# Patient Record
Sex: FEMALE | URBAN - METROPOLITAN AREA
[De-identification: names, ages, dates, MRNs, and addresses within clinical notes are randomized per-mention and may not be internally consistent; named-entity substitution may affect disease eponyms.]

---

## 2017-01-25 ENCOUNTER — OFFICE VISIT (OUTPATIENT)
Dept: PSYCHIATRY | Facility: CLINIC | Age: 42
End: 2017-01-25
Attending: CLINICAL NURSE SPECIALIST
Payer: COMMERCIAL

## 2017-01-25 ENCOUNTER — OFFICE VISIT (OUTPATIENT)
Dept: PSYCHOLOGY | Facility: CLINIC | Age: 42
End: 2017-01-25

## 2017-01-25 VITALS
SYSTOLIC BLOOD PRESSURE: 124 MMHG | DIASTOLIC BLOOD PRESSURE: 69 MMHG | WEIGHT: 173.4 LBS | BODY MASS INDEX: 30.23 KG/M2 | HEART RATE: 66 BPM

## 2017-01-25 DIAGNOSIS — F32.0 MILD SINGLE CURRENT EPISODE OF MAJOR DEPRESSIVE DISORDER (H): Primary | ICD-10-CM

## 2017-01-25 DIAGNOSIS — F43.23 ADJUSTMENT DISORDER WITH MIXED ANXIETY AND DEPRESSED MOOD: Primary | ICD-10-CM

## 2017-01-25 DIAGNOSIS — F33.8 SEASONAL AFFECTIVE DISORDER (H): ICD-10-CM

## 2017-01-25 PROCEDURE — 99212 OFFICE O/P EST SF 10 MIN: CPT | Mod: 25,ZF

## 2017-01-25 PROCEDURE — 99212 OFFICE O/P EST SF 10 MIN: CPT | Mod: ZF

## 2017-01-25 NOTE — PROGRESS NOTES
Outpatient Psychiatry Progress Note     Provider: LYNDA Gonsalez CNS  Date: 2017  Service:  Medication follow up with counseling.   Patient Identification: Mellias Thompson  : 1975   MRN: [id number]    Mellisa Thompson is a 41 year old year old female who presents for ongoing psychiatric care.  Mellisa Thompson was last seen in clinic on 10/26/16.   At that time,   Assessment & Plan       Mellisa Thompson is seen today for follow up and reports she has noticed further improvement and would like to continue current medication. Reviewed length of treatment of at least 6 months but ideally one year.    Diagnosis  Axis 1: Adjustment Disorder with mixed anxiety and depression  Axis 2: none  Axis 3: See problem list in the medical record    Plan:  Medication: Continue Sertraline at 50mg    OTC Recommendations: no change  Lab Orders:  none  Referrals: none  Release of Information: none  Future Treatment Considerations:per symptoms  Return for Follow Up:2 months               2017  Today Mellisa reports that during break when she still slept to much. When working this is not a problem.  She sleeps too much on weekends.  Mild anxiety but mood has not been a problem. This winter was better than any other fox in the [place].   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  Side effects of medication include: none known.   Psychiatric Review of Systems:  The patient endorses symptoms of depression: In the last 2 weeks denies  She  patient endorses symptoms of anxiety : situational  She endorses symptoms of agustina including none.    She endorses symptoms of psychosis including no psychotic symptoms.       Review of Medical Systems:  Sleep: mild sleeping too much  Energy: stable  Concentration: no concerns  Appetite: stable  GI Concerns: none  Cardiac concerns: none  Neurological concerns: none  Other medical concerns: no new concerns  Current Substance Use:  Alcohol:denies frequent use or abuse  Other  "drugs:denies  Caffeine:not discussed  Nicotine: none  Past Medical History:   Past Medical History   Diagnosis Date     Epilepsy (H)      Depression      Anxiety      Patient Active Problem List   Diagnosis     Convulsions (H)     Sinus tachycardia     Adjustment disorder with mixed anxiety and depressed mood       Allergies: No Known Allergies       Current Medications     Current Outpatient Prescriptions Ordered in [facility]   Medication Sig Dispense Refill     sertraline (ZOLOFT) 50 MG tablet Take 1 tablet (50 mg) by mouth daily 90 tablet 3     Cholecalciferol (VITAMIN D) 2000 UNITS tablet Take 1 tablet by mouth daily Take one tablet daily. 90 tablet 3     Prenatal Vit-Fe Fumarate-FA (PRENATAL LOW IRON) 27-1 MG TABS Take one daily 90 tablet 1     Omega-3 Fatty Acids (OMEGA III EPA+DHA) 1000 MG CAPS Take one twice a day 90 capsule 1     ALPRAZolam (XANAX) 0.25 MG tablet Take 1 tablet (0.25 mg) by mouth 3 times daily as needed for anxiety       triamcinolone (KENALOG) 0.1 % ointment Apply to affected chest up to twice daily as needed. 80 g 1     minoxidil 5 % SOLN Apply topically once daily to entire scalp. . 60 mL 0     levETIRAcetam (KEPPRA) 500 MG tablet Take 2 tablets (1,000 mg) by mouth 2 times daily 120 tablet 11     No current [facility]-ordered facility-administered medications on file.        Mental Status Exam     Appearance:  Casually dressed and Well groomed  Behavior/relationship to examiner/demeanor:  Cooperative  Orientation: Oriented to person, place, time and situation  Psychomotor: normal form  Speech Rate:  Normal  Speech Spontaneity:  Normal  Mood:  \"okay\"  Affect:  Appropriate/mood-congruent  Thought Process (Associations):  Goal directed  Thought Content:  no overt psychosis, denies suicidal ideation, intent or thoughts and patient does not appear to be responding to internal stimuli  Abnormal Perception:  None  Attention/Concentration:  Normal  Language:  Intact  Insight:  Good  Judgment:  " Good      Results     Vital signs: /69 mmHg  Pulse 66  Wt 78.654 kg (173 lb 6.4 oz)    Laboratory Data:  no new data    Assessment & Plan      Mellisa Thompson is seen today for follow up and reports her mood has been stable and improved with Sertraline at current dose.   Can consider discontinuing Sertraline in August but at that time can discuss stress level, any change in mood in the summer.     Diagnosis  Axis 1: Adjustment Disorder with mixed anxiety and depression  Axis 2: none  Axis 3: See problem list in the medical record    Plan:  Medication: Continue current Sertraline at 50mg.  OTC Recommendations: none  Lab Orders:  none  Referrals: none  Release of Information: none  Future Treatment Considerations:per symptoms  Return for Follow Up:6 months, but can consider following up with her PCP instead of returning here.   The risks, benefits, alternatives and side effects have been discussed and are understood by the patient. The patient understands the risks of using street drugs or alcohol. There are no medical contraindications, the patient agrees to treatment, and has the capacity to do so. The patient understands to call 911 or come to the nearest ED if life threatening or urgent symptoms present.  Over 50% of this time was spent counseling the patient and/or coordinating care regarding review of social and occupational functioning.  In addition patient was counseled on health and wellness practices to augment medication treatment of symptoms. See note for details.    Dominique Miller, LYNDA CNS 1/25/2017

## 2017-01-25 NOTE — PATIENT INSTRUCTIONS
INSTRUCTIONS FOR LIGHT BOX THERAPY    PLACE THE LIGHT BOX ON A TABLE OR COUNTER WHERE YOU CAN SIT COMFORTABLY.  FOLLOW THE 'S INFORMATION FOR THE DISTANCE TO THE LIGHT BOX.  YOU SHOULD NOT STARE DIRECTLY INTO THE LIGHT. YOU CAN READ OR EAT WHILE USING THE LIGHT. YOUR EYES MUST BE OPEN.  START WITH 30 MINUTES OF LIGHT EXPOSURE AS SOON AS POSSIBLE AFTER WAKING ( BETWEEN 6AM AND 9 AM).  MOST PEOPLE USE LIGHT THERAPY THROUGH THE WINTER UNTIL SPRINGTIME.    WHEN TO EXPECT A RESPONSE    YOU SHOULD NOTICE A RESPONSE IN A FEW DAYS AND BY TWO WEEKS DEFINITE IMPROVEMENT.   WHEN LIGHT THERAPY IS STOPPED IT MAY BE A FEW DAYS BEFORE SYMPTOMS REAPPEAR.    WHAT TO DO IF IMPROVEMENT IS NOT NOTICED    IF NO IMPROVEMENT AFTER 10 TO 14 DAYS, TRY SPENDING UP TO 60 MINUTES PER DAY IN FRONT OF THE LIGHT EVERY MORNING OR DIVIDING THE TIME BETWEEN MORNING AND EVENING. DO NOT USE CLOSE TO BEDTIME.      _____________________________________________________________________

## 2017-01-25 NOTE — NURSING NOTE
Chief Complaint   Patient presents with     RECHECK     Adjustment disorder with mixed depression and anxiety     Reviewed allergies, smoking status, and pharmacy preference  Administered abuse screening questions   Obtained weight, blood pressure and heart rate   Darcy Grider LPN

## 2017-01-30 NOTE — PROGRESS NOTES
"Health Psychology                                      Department of Medicine                                           [facility] Mail Code 749    Edwina Lara, Ph.D., L.P. [phone]  [address] [place] [address], [facility] Luzma Oleary, Ph.D.,  L.P. [phone]  [place] [address]  Med Hansen, Ph.D., A.B.JOAO., L.P. [phone]       Joyce Huerta, PhD, LP [phone]  ________________________________________________________________________________________________  Health Psychology - Follow up Visit  Confidential Summary*    REFERRAL SOURCE  Self    CHIEF COMPLAINT/REASON FOR VISIT  Patient seen for weekly CBT session.      Subjective:  Patient began with report that she continues to experience anxiety regarding the results of the election and sees the similarity between Robyn and Espinoza, who ruled [facility] when she was there.  She continues to voice concern regarding the acceptance of fear of \"immigrants\" that this administration may be encouraging.  She also reported that, for the first time since living in [place], an episode of discrimination at a local restaurant.  She was able to speak to the owner but received little consolation.  She will attempt to be involved in the woman's movement and believes that her leadership skills may be helpful during this time.      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  If she is not accepted, she is not sure what she will do for her career but she maintains optimism that her future is bright.      She reported that she continues to go to the Yoga studio and is enjoying new friendships.      Discussed her improvements since beginning antidepressant treatment and she expressed appreciation and an intention to remain on zoloft throughout the winter months.  She has historically suferred some seasonal decreased mood but this is not happening " this year and she attributes this to the medication.    Objective:  Patient was on time for today s session, appropriately groomed and dressed, and demonstrated good eye contact.  She appeared friendly, alert and oriented.   Mood was euthymic and her affect bright.  Patient adamantly denied suicidal or assaultive ideation, plan, or intent.       Assessment:  The patient is complying with her antidepressant medication and recontinues to report improved overall mood and limited side effects. She is noteably less anxious and depressed and more optimistic about the future.      Plan: She reported that her new insurance covers my services and she would like to continue biweekly sessions to continue to explore career and relationship changes.      Time In: 3:00  Time Out: 4:00    Diagnosis:  Axis I MDD   Axis II Deferred   Axis III Epilepsy, see medical record   Axis IV Psychosocial and Environmental Stressors: new job, awaiting word on career direction,  recently     Joyce Huerta, Ph.D., L.P.      *In accordance with the Rules of the [place] Board of [employer], it is noted that psychological descriptions and scientific procedures underlying psychological evaluations have limitations.  Absolute predictions cannot be made based on information in this report.

## 2017-02-15 ENCOUNTER — OFFICE VISIT (OUTPATIENT)
Dept: PSYCHOLOGY | Facility: CLINIC | Age: 42
End: 2017-02-15

## 2017-02-15 DIAGNOSIS — F32.1 MODERATE SINGLE CURRENT EPISODE OF MAJOR DEPRESSIVE DISORDER (H): Primary | ICD-10-CM

## 2017-02-15 NOTE — MR AVS SNAPSHOT
After Visit Summary   2/15/2017    Mellisa Thompson    MRN: [id number]           Patient Information     Date Of Birth          1975        Visit Information        Provider Department      2/15/2017 3:00 PM Joyce Huerta, PhD [facility]        Today's Diagnoses     Moderate single current episode of major depressive disorder (H)    -  1       Follow-ups after your visit        Who to contact     Please call your clinic at [phone] to:    Ask questions about your health    Make or cancel appointments    Discuss your medicines    Learn about your test results    Speak to your doctor   If you have compliments or concerns about an experience at your clinic, or if you wish to file a complaint, please contact [facility] Patient Relations at [phone] or email us at [email or web address]         Additional Information About Your Visit        MyChart Information     Volar Videot is an electronic gateway that provides easy, online access to your medical records. With [email or web address], you can request a clinic appointment, read your test results, renew a prescription or communicate with your care team.     To sign up for Volar Videot visit the website at [email or web address]   You will be asked to enter the access code listed below, as well as some personal information. Please follow the directions to create your username and password.     Your access code is: [email or web address]  Expires: 3/20/2017  1:22 PM     Your access code will  in 90 days. If you need help or a new code, please contact your [facility] or call [phone] for assistance.        Care EveryWhere ID     This is your Care EveryWhere ID. This could be used by other organizations to access your [facility] medical records  [id number]         Blood Pressure from Last 3 Encounters:   17 124/69   16 110/70   10/26/16 119/74    Weight from Last 3  Encounters:   01/25/17 78.7 kg (173 lb 6.4 oz)   12/20/16 77 kg (169 lb 12.8 oz)   10/26/16 78.3 kg (172 lb 9.6 oz)              Today, you had the following     No orders found for display       Primary Care Provider Office Phone # Fax #    Ree Michele [phone]       [facility] [address] [facility] [address]  [facility] [address]        Thank you!     Thank you for choosing [facility]  for your care. Our goal is always to provide you with excellent care. Hearing back from our patients is one way we can continue to improve our services. Please take a few minutes to complete the written survey that you may receive in the mail after your visit with us. Thank you!             Your Updated Medication List - Protect others around you: Learn how to safely use, store and throw away your medicines at www.disposemymeds.org.          This list is accurate as of: 2/15/17 11:59 PM.  Always use your most recent med list.                   Brand Name Dispense Instructions for use    ALPRAZolam 0.25 MG tablet    XANAX     Take 1 tablet (0.25 mg) by mouth 3 times daily as needed for anxiety       levETIRAcetam 500 MG tablet    KEPPRA    120 tablet    Take 2 tablets (1,000 mg) by mouth 2 times daily       minoxidil 5 % Soln     60 mL    Apply topically once daily to entire scalp. .       OMEGA III EPA+DHA 1000 MG Caps     90 capsule    Take one twice a day       order for DME     1 Box    Equipment being ordered: Light box 10,000 Lux for seasonal affective disorder code F39       PRENATAL LOW IRON 27-1 MG Tabs     90 tablet    Take one daily       sertraline 50 MG tablet    ZOLOFT    90 tablet    Take 1 tablet (50 mg) by mouth daily       triamcinolone 0.1 % ointment    KENALOG    80 g    Apply to affected chest up to twice daily as needed.       vitamin D 2000 UNITS tablet     90 tablet    Take 1 tablet by mouth daily Take one tablet daily.

## 2017-02-16 NOTE — PROGRESS NOTES
Health Psychology                                      Department of Medicine                                           [facility] Mail Code 741    Edwina Lara, Ph.D., L.P. [phone]  [address] [place] [address], [facility] Luzma Oleary, Ph.D.,  L.P. [phone]  [place] [address]  Med Hansen, Ph.D., A.B.P.P., L.P. [phone]       Joyce Huerta, PhD, LP [phone]  ________________________________________________________________________________________________  Health Psychology - Follow up Visit  Confidential Summary*    REFERRAL SOURCE  Self    CHIEF COMPLAINT/REASON FOR VISIT  Patient seen for weekly CBT session.      Subjective:  Patient began with report that she had difficult interaction at her employment and believes the situation may have occurred due to an underlying disrespect, resentment and anger toward immigrants.  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  An issue occurred in which it was unclear who the child should go home with.  A teacher from the school openly confronted her, accusing her of not doing her job and implied that she looked confused and frequently looked this way.  The patient reported that the encounter occurred in front of parents, drivers and other students and was humiliating.  She reported that she was very upset at the encounter and was unable to go to work the next day for fear that she would be confronted by the same teacher.  She filed a complaint and the principal held a meting with a union rep and the teacher during which time the teacher explained.  The patient reported that she was offerred an apology but that the teacher appeared to justify her actions.     The patient is wondering if the recent election results and political climate surrounding immigrants in our  country may have instigated the situation.      She is considering relocating.  She did not receive an offer for the teachers program and is wondering if this may be an opportunity to make a move for herself.      Objective:  Patient was on time for today s session, appropriately groomed and dressed, and demonstrated good eye contact.  She appeared alert and oriented.   Mood was dysphoric and her affect anxious.  Patient adamantly denied suicidal or assaultive ideation, plan, or intent.       Assessment:  The patients recent situation was reported as traumatic and the patient is experiencing anxiety and avoidance behaviors.      Plan: She will be seen in one week to assist in adjusting to recent incident.        Time In: 3:00  Time Out: 4:00    Diagnosis:  Axis I MDD   Axis II Deferred   Axis III Epilepsy, see medical record   Axis IV Psychosocial and Environmental Stressors: work related     Joyce Huerta, Ph.D., L.P.      *In accordance with the Rules of the [place] Board of Psychology, it is noted that psychological descriptions and scientific procedures underlying psychological evaluations have limitations.  Absolute predictions cannot be made based on information in this report.

## 2017-02-24 ENCOUNTER — TELEPHONE (OUTPATIENT)
Dept: NEUROLOGY | Facility: CLINIC | Age: 42
End: 2017-02-24

## 2017-02-24 DIAGNOSIS — R56.9 CONVULSIONS (H): Primary | ICD-10-CM

## 2017-02-24 RX ORDER — LEVETIRACETAM 1000 MG/1
1000 TABLET ORAL 2 TIMES DAILY
Qty: 180 TABLET | Refills: 2 | Status: SHIPPED | OUTPATIENT
Start: 2017-02-24 | End: 2017-05-26

## 2017-02-24 NOTE — TELEPHONE ENCOUNTER
Writer received message:  Caller name:pt [phone]     Treating provider/specialty:   Kimberlee     Description of issue/question:   Pt wants 1000mg rtabs like she has had in past and  90D refills.     Said pharmacy has sent several requests for med -I asked to to give our new fax# to pharmacy but she refused to.     Pt is completely out of med. I encouraged her to get a refill from the pharmacy asap-that she should take asap.   She insists that we call pharmacy to straighten this issue out.     She also has med questions.   levETIRAcetam (KEPPRA) 500 MG tablet 120 tablet 11 7/26/2016 No   Sig: Take 2 tablets (1,000 mg) by mouth 2 times daily   Class: E-Prescribe      Writer called pharmacy and they indicate patient last picked up meds on 1-19-17 so she should be out currently.  She does have refills at this point, but as above is unhappy with 500 mg tabs.  Writer will D/C 500 mg tabs and order 1000 mg tabs per her request.    Giorgi Croft

## 2017-03-01 ENCOUNTER — OFFICE VISIT (OUTPATIENT)
Dept: PSYCHOLOGY | Facility: CLINIC | Age: 42
End: 2017-03-01

## 2017-03-01 DIAGNOSIS — F33.1 MAJOR DEPRESSIVE DISORDER, RECURRENT, MODERATE (H): Primary | ICD-10-CM

## 2017-03-01 NOTE — MR AVS SNAPSHOT
After Visit Summary   3/1/2017    Mellisa Thompson    MRN: [id number]           Patient Information     Date Of Birth          1975        Visit Information        Provider Department      3/1/2017 2:00 PM Joyce Huerta, PhD [facility]        Today's Diagnoses     Major depressive disorder, recurrent, moderate (H)    -  1       Follow-ups after your visit        Your next 10 appointments already scheduled     Mar 15, 2017  2:00 PM CDT   (Arrive by 1:45 PM)   Return Visit with Joyce Huerta, PhD NIYAH   [facility] ([facility] [employer] [facility] [employer])    [address] [place] [id number] [phone]              Who to contact     Please call your clinic at [phone] to:    Ask questions about your health    Make or cancel appointments    Discuss your medicines    Learn about your test results    Speak to your doctor   If you have compliments or concerns about an experience at your clinic, or if you wish to file a complaint, please contact [facility] Patient Relations at [phone] or email us at [email or web address]         Additional Information About Your Visit        MyChart Information     Prismatict is an electronic gateway that provides easy, online access to your medical records. With [employer], you can request a clinic appointment, read your test results, renew a prescription or communicate with your care team.     To sign up for Prismatict visit the website at [email or web address]   You will be asked to enter the access code listed below, as well as some personal information. Please follow the directions to create your username and password.     Your access code is: [email or web address]  Expires: 3/20/2017  1:22 PM     Your access code will  in 90 days. If you need help or a new code, please contact your [facility] or call [phone] for assistance.         Care EveryWhere ID     This is your Care EveryWhere ID. This could be used by other organizations to access your [facility] medical records  [id number]         Blood Pressure from Last 3 Encounters:   01/25/17 124/69   12/20/16 110/70   10/26/16 119/74    Weight from Last 3 Encounters:   01/25/17 78.7 kg (173 lb 6.4 oz)   12/20/16 77 kg (169 lb 12.8 oz)   10/26/16 78.3 kg (172 lb 9.6 oz)              Today, you had the following     No orders found for display       Primary Care Provider Office Phone # Fax #    Ree Michele -[phone]       [facility] [address] [address]        Thank you!     Thank you for choosing [facility]  for your care. Our goal is always to provide you with excellent care. Hearing back from our patients is one way we can continue to improve our services. Please take a few minutes to complete the written survey that you may receive in the mail after your visit with us. Thank you!             Your Updated Medication List - Protect others around you: Learn how to safely use, store and throw away your medicines at www.disposemymeds.org.          This list is accurate as of: 3/1/17 11:59 PM.  Always use your most recent med list.                   Brand Name Dispense Instructions for use    ALPRAZolam 0.25 MG tablet    XANAX     Take 1 tablet (0.25 mg) by mouth 3 times daily as needed for anxiety       levETIRAcetam 1000 MG Tabs     180 tablet    Take 1,000 mg by mouth 2 times daily       minoxidil 5 % Soln     60 mL    Apply topically once daily to entire scalp. .       OMEGA III EPA+DHA 1000 MG Caps     90 capsule    Take one twice a day       order for DME     1 Box    Equipment being ordered: Light box 10,000 Lux for seasonal affective disorder code F39       PRENATAL LOW IRON 27-1 MG Tabs     90 tablet    Take one daily       sertraline 50 MG tablet    ZOLOFT    90 tablet    Take 1 tablet (50 mg) by mouth  daily       triamcinolone 0.1 % ointment    KENALOG    80 g    Apply to affected chest up to twice daily as needed.       vitamin D 2000 UNITS tablet     90 tablet    Take 1 tablet by mouth daily Take one tablet daily.

## 2017-03-02 ENCOUNTER — TELEPHONE (OUTPATIENT)
Dept: OBGYN | Facility: CLINIC | Age: 42
End: 2017-03-02

## 2017-03-02 NOTE — TELEPHONE ENCOUNTER
Telephone call from [facility], she is requesting Dr. Michele write a letter for the [employer]. She was supposed to travel on 1/2/17 and was unable to because of stomach flu, which consisted of vomiting, diarrhea and stomach pain. We will send this request to DR. Michele

## 2017-03-03 NOTE — TELEPHONE ENCOUNTER
Please see note below, per Dr. Michele,     Because there is NO documentation of her visit and it is two months past I do not feel comfortable writing this request.          Telephone call to Mellisa, no answer, left message with the above note from

## 2017-03-06 NOTE — PROGRESS NOTES
"Health Psychology                                      Department of Medicine                                           [facility] Mail Code 741    Edwina Lara, Ph.D., L.P. [phone]  [address] [place] [address], [facility] Luzma Oleary, Ph.D.,  L.P. [phone]  [place] [address]  Med Hansen, Ph.D., A.B.P.P., L.P. [phone]       Joyce Huerta, PhD, LP [phone]  ________________________________________________________________________________________________  Health Psychology - Follow up Visit  Confidential Summary*    REFERRAL SOURCE  Self    CHIEF COMPLAINT/REASON FOR VISIT  Patient seen for weekly CBT session.      Subjective:  Patient began with report that she continues to recognize that her resilience is low and would like our counseling to focus more on \"cultivating resilience\".  We discussed mindfulness and the work of Opal Perez on self compassion.  Will reach out to local experts for resources and possible referral to providers specializing in this work.   She continues to describe fatigue in her current occupational role and feeling defeated.  She is aware that she is underfunctioning but is not sure whether she should focus her energy on having a more fulfilling life outside of her occupation or if she should dedicate her energy to returning to school to retrain into an occupation that might be better suited to her interests and intellect.      She continues to process the impact of her marriage and divorce and is aware that [facility] may also not match her energy level and willingness to be vulnerable.  She continues to identify a \"draining\" existence.  She reported that she believes her trust has been lost and is now working to regain it but is not sure who she should be relying on.      Discussed what is lost when she trusts others.  She is beginning to consider dating.      Objective:  Patient was on time for " today s session, appropriately groomed and dressed, and demonstrated good eye contact.  She appeared alert and oriented.   Mood was dysphoric and her affect anxious.  Patient adamantly denied suicidal or assaultive ideation, plan, or intent.       Assessment:  The patients continues to describe dissatisfaction with occupation and isolation.      Plan: She will be seen in two weeks.          Time In: 2:00  Time Out: 3:00    Diagnosis:  Axis I MDD   Axis II Deferred   Axis III Epilepsy, see medical record   Axis IV Psychosocial and Environmental Stressors: work related     Joyce Huerta, Ph.D., L.P.      *In accordance with the Rules of the [place] Board of Psychology, it is noted that psychological descriptions and scientific procedures underlying psychological evaluations have limitations.  Absolute predictions cannot be made based on information in this report.

## 2017-03-15 ENCOUNTER — OFFICE VISIT (OUTPATIENT)
Dept: PSYCHOLOGY | Facility: CLINIC | Age: 42
End: 2017-03-15

## 2017-03-15 DIAGNOSIS — F32.0 MILD SINGLE CURRENT EPISODE OF MAJOR DEPRESSIVE DISORDER (H): Primary | ICD-10-CM

## 2017-03-15 NOTE — MR AVS SNAPSHOT
After Visit Summary   3/15/2017    Mellisa Thompson    MRN: [id number]           Patient Information     Date Of Birth          1975        Visit Information        Provider Department      3/15/2017 2:00 PM Joyce Huerta, PhD [facility]        Today's Diagnoses     Mild single current episode of major depressive disorder (H)    -  1       Follow-ups after your visit        Who to contact     Please call your clinic at [phone] to:    Ask questions about your health    Make or cancel appointments    Discuss your medicines    Learn about your test results    Speak to your doctor   If you have compliments or concerns about an experience at your clinic, or if you wish to file a complaint, please contact [facility] Patient Relations at [phone] or email us at [email or web address]         Additional Information About Your Visit        MyChart Information     BTIGt is an electronic gateway that provides easy, online access to your medical records. With BTCJam, you can request a clinic appointment, read your test results, renew a prescription or communicate with your care team.     To sign up for BTIGt visit the website at [email or web address]   You will be asked to enter the access code listed below, as well as some personal information. Please follow the directions to create your username and password.     Your access code is: [email or web address]  Expires: 3/20/2017  2:22 PM     Your access code will  in 90 days. If you need help or a new code, please contact your [facility] or call [phone] for assistance.        Care EveryWhere ID     This is your Care EveryWhere ID. This could be used by other organizations to access your [place] medical records  [id number]         Blood Pressure from Last 3 Encounters:   17 124/69   16 110/70   10/26/16 119/74    Weight from Last 3  Encounters:   01/25/17 78.7 kg (173 lb 6.4 oz)   12/20/16 77 kg (169 lb 12.8 oz)   10/26/16 78.3 kg (172 lb 9.6 oz)              Today, you had the following     No orders found for display       Primary Care Provider Office Phone # Fax #    Ree Michele [phone]       [facility] [address] [facility] [address]  [facility] [address]        Thank you!     Thank you for choosing [facility]  for your care. Our goal is always to provide you with excellent care. Hearing back from our patients is one way we can continue to improve our services. Please take a few minutes to complete the written survey that you may receive in the mail after your visit with us. Thank you!             Your Updated Medication List - Protect others around you: Learn how to safely use, store and throw away your medicines at www.disposemymeds.org.          This list is accurate as of: 3/15/17 11:59 PM.  Always use your most recent med list.                   Brand Name Dispense Instructions for use    ALPRAZolam 0.25 MG tablet    XANAX     Take 1 tablet (0.25 mg) by mouth 3 times daily as needed for anxiety       levETIRAcetam 1000 MG Tabs     180 tablet    Take 1,000 mg by mouth 2 times daily       minoxidil 5 % Soln     60 mL    Apply topically once daily to entire scalp. .       OMEGA III EPA+DHA 1000 MG Caps     90 capsule    Take one twice a day       order for DME     1 Box    Equipment being ordered: Light box 10,000 Lux for seasonal affective disorder code F39       PRENATAL LOW IRON 27-1 MG Tabs     90 tablet    Take one daily       sertraline 50 MG tablet    ZOLOFT    90 tablet    Take 1 tablet (50 mg) by mouth daily       triamcinolone 0.1 % ointment    KENALOG    80 g    Apply to affected chest up to twice daily as needed.       vitamin D 2000 UNITS tablet     90 tablet    Take 1 tablet by mouth daily Take one tablet daily.

## 2017-03-17 NOTE — PROGRESS NOTES
Health Psychology                                      Department of Medicine                                           [facility] Mail Code 741    Edwina Lara, Ph.D., L.P. [phone]  [address] [place] [address], [facility] [place] [place] Luzma Oleary, Ph.D.,  L.P. [phone]  [place] [address]  Med Hansen, Ph.D., A.B.P.CARLI., L.P. [phone]       Joyce Huerta, PhD, LP [phone]  ________________________________________________________________________________________________  Health Psychology - Follow up Visit  Confidential Summary*    REFERRAL SOURCE  Self    CHIEF COMPLAINT/REASON FOR VISIT  Patient seen for weekly CBT session.      Subjective:  Patient began with report that she is interested in resilience and received materials sent to her but has not had opportunity to review yet but will.      Patient clarified that she only has her brother, sis in law and their two children as family support here in [place].  I was under impression that her entire family was here but they remain in [facility] [place] [facility] and she has a brother in [place].  She talked at length of a close relationship with her nephew and her desire to be closer to 8 year old niece.      She excitedly reported that she has made a decision to relocate to [place] and to leave the home that she shared when  to [place], .  She reported that she informed him that they might now sell the home or he can relocate there but he pushed back and suggested that he may move to [place] himself.  Patient verbalized anxiety regarding whether he would do this. She would like a fresh start away from him, now that they are  almost 3 years..      She reported that she is considering dating.    Objective:  Patient was on time for today s session, appropriately groomed and dressed, and demonstrated good eye contact.  She appeared alert and oriented.   Mood was dysphoric and her affect  anxious.  Patient adamantly denied suicidal or assaultive ideation, plan, or intent.       Assessment:  The patients continues to describe dissatisfaction with occupation.  However, she is making some positive choices to begin a new chapter in her life.  .      Plan: She will be seen in two weeks.          Time In: 2:00  Time Out: 3:00    Diagnosis:  Axis I MDD   Axis II Deferred   Axis III Epilepsy, see medical record   Axis IV Psychosocial and Environmental Stressors: work related     Joyce Huerta, Ph.D., L.P.      *In accordance with the Rules of the [place] Board of Psychology, it is noted that psychological descriptions and scientific procedures underlying psychological evaluations have limitations.  Absolute predictions cannot be made based on information in this report.

## 2017-04-03 DIAGNOSIS — D50.8 OTHER IRON DEFICIENCY ANEMIA: ICD-10-CM

## 2017-04-03 RX ORDER — PNV,CALCIUM 72/IRON/FOLIC ACID 27 MG-1 MG
TABLET ORAL
Qty: 90 TABLET | Refills: 3 | Status: SHIPPED | OUTPATIENT
Start: 2017-04-03 | End: 2018-02-15

## 2017-04-12 ENCOUNTER — OFFICE VISIT (OUTPATIENT)
Dept: PSYCHOLOGY | Facility: CLINIC | Age: 42
End: 2017-04-12

## 2017-04-12 DIAGNOSIS — F33.0 MAJOR DEPRESSIVE DISORDER, RECURRENT, MILD (H): Primary | ICD-10-CM

## 2017-04-12 NOTE — MR AVS SNAPSHOT
After Visit Summary   2017    Mellisa Thompson    MRN: [id number]           Patient Information     Date Of Birth          1975        Visit Information        Provider Department      2017 2:00 PM Joyce Huerta, PhD NIYAH [facility]        Today's Diagnoses     Major depressive disorder, recurrent, mild (H)    -  1       Follow-ups after your visit        Your next 10 appointments already scheduled     May 03, 2017  2:00 PM CDT   (Arrive by 1:45 PM)   Return Visit with Joyce Huerta, PhD NIYAH   [facility] ([facility])    [address] [place] [id number] [phone]              Who to contact     Please call your clinic at [phone] to:    Ask questions about your health    Make or cancel appointments    Discuss your medicines    Learn about your test results    Speak to your doctor   If you have compliments or concerns about an experience at your clinic, or if you wish to file a complaint, please contact [facility] Patient Relations at [phone] or email us at [email or web address]         Additional Information About Your Visit        MyChart Information     Mixxt is an electronic gateway that provides easy, online access to your medical records. With Shopo, you can request a clinic appointment, read your test results, renew a prescription or communicate with your care team.     To sign up for Mixxt visit the website at [email or web address]   You will be asked to enter the access code listed below, as well as some personal information. Please follow the directions to create your username and password.     Your access code is: [email or web address]  Expires: 2017  2:28 PM     Your access code will  in 90 days. If you need help or a new code, please contact your [facility] or call [phone] for assistance.         Care EveryWhere ID     This is your Care EveryWhere ID. This could be used by other organizations to access your [place] medical records  [id number]         Blood Pressure from Last 3 Encounters:   04/14/17 119/76   01/25/17 124/69   12/20/16 110/70    Weight from Last 3 Encounters:   04/14/17 76.2 kg (167 lb 14.4 oz)   01/25/17 78.7 kg (173 lb 6.4 oz)   12/20/16 77 kg (169 lb 12.8 oz)              Today, you had the following     No orders found for display       Primary Care Provider Office Phone # Fax #    Ree Michele [phone]       [facility] [address] [address]        Thank you!     Thank you for choosing [facility]  for your care. Our goal is always to provide you with excellent care. Hearing back from our patients is one way we can continue to improve our services. Please take a few minutes to complete the written survey that you may receive in the mail after your visit with us. Thank you!             Your Updated Medication List - Protect others around you: Learn how to safely use, store and throw away your medicines at www.disposemymeds.org.          This list is accurate as of: 4/12/17 11:59 PM.  Always use your most recent med list.                   Brand Name Dispense Instructions for use    ALPRAZolam 0.25 MG tablet    XANAX     Take 1 tablet (0.25 mg) by mouth 3 times daily as needed for anxiety       levETIRAcetam 1000 MG Tabs     180 tablet    Take 1,000 mg by mouth 2 times daily       minoxidil 5 % Soln     60 mL    Apply topically once daily to entire scalp. .       OMEGA III EPA+DHA 1000 MG Caps     90 capsule    Take one twice a day       order for DME     1 Box    Equipment being ordered: Light box 10,000 Lux for seasonal affective disorder code F39       PREPLUS 27-1 MG Tabs     90 tablet    TAKE 1 TABLET BY MOUTH ONCE DAILY       sertraline 50 MG tablet    ZOLOFT    90 tablet    Take 1 tablet (50 mg) by  mouth daily       triamcinolone 0.1 % ointment    KENALOG    80 g    Apply to affected chest up to twice daily as needed.       vitamin D 2000 UNITS tablet     90 tablet    Take 1 tablet by mouth daily Take one tablet daily.

## 2017-04-14 ENCOUNTER — OFFICE VISIT (OUTPATIENT)
Dept: INTERNAL MEDICINE | Facility: CLINIC | Age: 42
End: 2017-04-14

## 2017-04-14 VITALS
RESPIRATION RATE: 18 BRPM | BODY MASS INDEX: 29.28 KG/M2 | DIASTOLIC BLOOD PRESSURE: 76 MMHG | TEMPERATURE: 98.2 F | SYSTOLIC BLOOD PRESSURE: 119 MMHG | WEIGHT: 167.9 LBS | OXYGEN SATURATION: 97 % | HEART RATE: 76 BPM

## 2017-04-14 DIAGNOSIS — R05.9 COUGH: Primary | ICD-10-CM

## 2017-04-14 DIAGNOSIS — J98.01 ACUTE BRONCHOSPASM: ICD-10-CM

## 2017-04-14 RX ORDER — ALBUTEROL SULFATE 90 UG/1
2 AEROSOL, METERED RESPIRATORY (INHALATION) EVERY 6 HOURS PRN
Qty: 1 INHALER | Refills: 0 | Status: SHIPPED | OUTPATIENT
Start: 2017-04-14 | End: 2018-02-15

## 2017-04-14 RX ORDER — BENZONATATE 100 MG/1
100 CAPSULE ORAL 3 TIMES DAILY PRN
Qty: 42 CAPSULE | Refills: 0 | Status: SHIPPED | OUTPATIENT
Start: 2017-04-14 | End: 2018-02-15

## 2017-04-14 ASSESSMENT — PAIN SCALES - GENERAL: PAINLEVEL: MODERATE PAIN (5)

## 2017-04-14 NOTE — MR AVS SNAPSHOT
After Visit Summary   4/14/2017    Mellisa Thompson    MRN: [id number]           Patient Information     Date Of Birth          1975        Visit Information        Provider Department      4/14/2017 2:00 PM Noreen Yang APRN [facility]        Today's Diagnoses     Cough    -  1    Acute bronchospasm          Care Instructions    Primary Care Center Medication Refill Request Information:  * Please contact your pharmacy regarding ANY request for medication refills.  ** [facility] Prescription Fax = [phone]  * Please allow 3 business days for routine medication refills.  * Please allow 5 business days for controlled substance medication refills.     Primary Care Center Test Result notification information:  *You will be notified with in 7-10 days of your appointment day regarding the results of your test.  If you are on MyChart you will be notified as soon as the provider has reviewed the results and signed off on them.    Primary Care Center Phone Number [phone]      Adult Self-Care for Colds  Colds are caused by viruses. They can t be cured with antibiotics. However, you can relieve symptoms and support your body s efforts to heal itself. No matter which symptoms you have, be sure to drink plenty of fluids (water or clear soup); stop smoking and drinking alcohol; and get plenty of rest.   Understand a fever    Take your temperature several times a day. If your fever is 100.4 F (38.0 C) for more than a day, call your doctor.    Relax, lie down. Go to bed if you want. Just get off your feet and rest. Also, drink plenty of fluids to avoid dehydration.    Take acetaminophen or a nonsteroidal anti-inflammatory agent (NSAID), such as ibuprofen.  Treat a troubled nose kindly    Breathe steam or heated humidified air to open blocked nasal passages.  a hot shower or use a vaporizer. Be careful not to get burned by the steam.    Saline nasal sprays and decongestant  tablets help open a stuffy nose. Antihistamines can also help, but they can cause side effects such as drowsiness and drying of the eyes, nose, and mouth.  Soothe a sore throat and cough    Gargle every 2 hours with 1/4 teaspoon of salt dissolved in 1/2 cup of warm water. Suck on throat lozenges and cough drops to moisten your throat.    Cough medicines are available but it is unclear how effective they actually are.    Take acetaminophen or an NSAID, such as ibuprofen to ease throat pain  Ease digestive problems    Put fluid back into your body. Take frequent sips of clear liquids such as water or broth. Do not drink beverages with a lot of sugar in them, such as juices and sodas. These can make diarrhea worse. Older children and adults can drink sports drinks.    As your appetite returns, you can resume your normal diet. Ask your doctor whether there are any foods you should avoid.     When to seek medical care  When you first notice symptoms, ask your health care provider if antiviral medications are appropriate. Antibiotics should not be taken for colds or flu. Also, call your doctor if you have any of the following symptoms or if you aren t feeling better after 7 days:    Shortness of breath    Pain or pressure in the chest or abdomen    Worsening symptoms, especially after a period of improvement    Fever of 100.4 F  (38.0 C) or higher, or fever that doesn t go down with medication    Sudden dizziness or confusion    Severe or continued vomiting    Signs of dehydration, including extreme thirst, dark urine, infrequent urination, dry mouth    Spotted, red, or very sore throat        0143-6320 [employer]. [address] [place] [address], [place] [address]. All rights reserved. This information is not intended as a substitute for professional medical care. Always follow your healthcare professional's instructions.              Follow-ups after your visit        Who to contact     Please call your clinic at  [phone] to:    Ask questions about your health    Make or cancel appointments    Discuss your medicines    Learn about your test results    Speak to your doctor   If you have compliments or concerns about an experience at your clinic, or if you wish to file a complaint, please contact [facility] Patient Relations at [phone] or email us at [email or web address]         Additional Information About Your Visit        [employer] Information     [employer] is an electronic gateway that provides easy, online access to your medical records. With [employer], you can request a clinic appointment, read your test results, renew a prescription or communicate with your care team.     To sign up for [employer] visit the website at [email or web address]   You will be asked to enter the access code listed below, as well as some personal information. Please follow the directions to create your username and password.     Your access code is: [email or web address]  Expires: 2017  2:28 PM     Your access code will  in 90 days. If you need help or a new code, please contact your [facility] or call [phone] for assistance.        Care EveryWhere ID     This is your Care EveryWhere ID. This could be used by other organizations to access your [place] medical records  [id number]        Your Vitals Were     Pulse Temperature Respirations Last Period Pulse Oximetry Breastfeeding?    76 98.2  F (36.8  C) (Oral) 18 04/10/2017 (Exact Date) 97% No    BMI (Body Mass Index)                   29.28 kg/m2            Blood Pressure from Last 3 Encounters:   17 119/76   16 110/70   16 117/74    Weight from Last 3 Encounters:   17 76.2 kg (167 lb 14.4 oz)   16 77 kg (169 lb 12.8 oz)   16 78 kg (172 lb)              Today, you had the following     No orders found for display         Today's Medication Changes          These changes are  accurate as of: 4/14/17  2:28 PM.  If you have any questions, ask your nurse or doctor.               Start taking these medicines.        Dose/Directions    albuterol 108 (90 BASE) MCG/ACT Inhaler   Commonly known as:  VENTOLIN HFA   Used for:  Acute bronchospasm   Started by:  Noreen Yang APRN CNP        Dose:  2 puff   Inhale 2 puffs into the lungs every 6 hours as needed for shortness of breath / dyspnea or wheezing   Quantity:  1 Inhaler   Refills:  0       benzonatate 100 MG capsule   Commonly known as:  TESSALON   Used for:  Cough   Started by:  Noreen Yang APRN CNP        Dose:  100 mg   Take 1 capsule (100 mg) by mouth 3 times daily as needed for cough   Quantity:  42 capsule   Refills:  0            Where to get your medicines      These medications were sent to [facility] [address] [place] [address] ALIZE LOTT AT [address] [facility] [address]     Phone:  [phone]     albuterol 108 (90 BASE) MCG/ACT Inhaler    benzonatate 100 MG capsule                Primary Care Provider Office Phone # Fax #    Ree Michele [phone]       [facility] [address] [address]        Thank you!     Thank you for choosing [facility]  for your care. Our goal is always to provide you with excellent care. Hearing back from our patients is one way we can continue to improve our services. Please take a few minutes to complete the written survey that you may receive in the mail after your visit with us. Thank you!             Your Updated Medication List - Protect others around you: Learn how to safely use, store and throw away your medicines at www.disposemymeds.org.          This list is accurate as of: 4/14/17  2:28 PM.  Always use your most recent med list.                   Brand Name Dispense Instructions for use    albuterol 108 (90 BASE) MCG/ACT Inhaler    VENTOLIN HFA    1  Inhaler    Inhale 2 puffs into the lungs every 6 hours as needed for shortness of breath / dyspnea or wheezing       ALPRAZolam 0.25 MG tablet    XANAX     Take 1 tablet (0.25 mg) by mouth 3 times daily as needed for anxiety       benzonatate 100 MG capsule    TESSALON    42 capsule    Take 1 capsule (100 mg) by mouth 3 times daily as needed for cough       levETIRAcetam 1000 MG Tabs     180 tablet    Take 1,000 mg by mouth 2 times daily       minoxidil 5 % Soln     60 mL    Apply topically once daily to entire scalp. .       OMEGA III EPA+DHA 1000 MG Caps     90 capsule    Take one twice a day       order for DME     1 Box    Equipment being ordered: Light box 10,000 Lux for seasonal affective disorder code F39       PREPLUS 27-1 MG Tabs     90 tablet    TAKE 1 TABLET BY MOUTH ONCE DAILY       sertraline 50 MG tablet    ZOLOFT    90 tablet    Take 1 tablet (50 mg) by mouth daily       triamcinolone 0.1 % ointment    KENALOG    80 g    Apply to affected chest up to twice daily as needed.       vitamin D 2000 UNITS tablet     90 tablet    Take 1 tablet by mouth daily Take one tablet daily.

## 2017-04-14 NOTE — NURSING NOTE
Chief Complaint   Patient presents with     Cough     Patient is here to discuss a dry cough. pt states bought 2 OTC medications and neither of them worked. Duration 1 week.      Pharyngitis     Patient is also here to discuss a sore throat. Duration about 4 days.      Lamar Mansfield LPN at 2:11 PM on 4/14/2017.

## 2017-04-14 NOTE — PROGRESS NOTES
Mellisa Thompson is a 41 year old female who comes in for    CC: URI symptoms  HPI:  Ms. Thompson recently traveled to [place], and started having cold symptoms while there. Cough has been present x1 week. Cough is dry/non-productive. Feels the OTC medications aren't working. She coughed so hard she felt nauseated. Will have episodes of coughing where she feels she can't stop, and her eyes will water.   Throat is irritated. No ear pain. Some nasal congestion/drainage. . No fevers. No allergies that she knows of, feels like maybe she has some allergies. No SOB.  Started OTC Allegra, didn't help.   Hasn't been sleeping well d/t cough.    Other issues discussed today:     Patient Active Problem List   Diagnosis     Convulsions (H)     Sinus tachycardia     Adjustment disorder with mixed anxiety and depressed mood     Seasonal affective disorder (H)       Current Outpatient Prescriptions   Medication Sig Dispense Refill     albuterol (VENTOLIN HFA) 108 (90 BASE) MCG/ACT Inhaler Inhale 2 puffs into the lungs every 6 hours as needed for shortness of breath / dyspnea or wheezing 1 Inhaler 0     benzonatate (TESSALON) 100 MG capsule Take 1 capsule (100 mg) by mouth 3 times daily as needed for cough 42 capsule 0     Prenatal Vit-Fe Fumarate-FA (PREPLUS) 27-1 MG TABS TAKE 1 TABLET BY MOUTH ONCE DAILY 90 tablet 3     levETIRAcetam 1000 MG TABS Take 1,000 mg by mouth 2 times daily 180 tablet 2     order for DME Equipment being ordered: Light box 10,000 Lux for seasonal affective disorder code F39 1 Box 0     Cholecalciferol (VITAMIN D) 2000 UNITS tablet Take 1 tablet by mouth daily Take one tablet daily. 90 tablet 3     Omega-3 Fatty Acids (OMEGA III EPA+DHA) 1000 MG CAPS Take one twice a day 90 capsule 1     ALPRAZolam (XANAX) 0.25 MG tablet Take 1 tablet (0.25 mg) by mouth 3 times daily as needed for anxiety       triamcinolone (KENALOG) 0.1 % ointment Apply to affected chest up to twice daily as needed. 80 g 1      minoxidil 5 % SOLN Apply topically once daily to entire scalp. . 60 mL 0     sertraline (ZOLOFT) 50 MG tablet Take 1 tablet (50 mg) by mouth daily (Patient not taking: Reported on 4/14/2017) 90 tablet 3         ALLERGIES: Review of patient's allergies indicates no known allergies.    PAST MEDICAL HX:   Past Medical History:   Diagnosis Date     Anxiety      Depression      Epilepsy (H)        PAST SURGICAL HX:   Past Surgical History:   Procedure Laterality Date     ANKLE SURGERY Right 1999     BRAIN SURGERY  2007    right temporal lobectomy        IMMUNIZATION HX:   Immunization History   Administered Date(s) Administered     MMR 05/03/2006     Tdap (Adacel,Boostrix) 05/09/2011     Varicella 05/03/2006       SOCIAL HX:   Social History     Social History Narrative    First-       ROS:   CONSTITUTIONAL: no fatigue, no unexpected change in weight  SKIN: no worrisome rashes, no worrisome moles, no worrisome lesions  EYES: no acute vision problems or changes  ENT:see HPI  RESP:see HPI  CV: no chest pain, no palpitations, no new or worsening peripheral edema  GI: no nausea, no vomiting, no constipation, no diarrhea    OBJECTIVE:  /76  Pulse 76  Temp 98.2  F (36.8  C) (Oral)  Resp 18  Wt 76.2 kg (167 lb 14.4 oz)  LMP 04/10/2017 (Exact Date)  SpO2 97%  Breastfeeding? No  BMI 29.28 kg/m2   Wt Readings from Last 1 Encounters:   04/14/17 76.2 kg (167 lb 14.4 oz)     Constitutional: no distress, comfortable, pleasant, well-groomed  Eyes: anicteric, conjunctiva pink, normal extra-ocular movements   Ears, Nose and Throat: tympanic membranes pearly gray with positive light reflex, EACs clear bilaterally, nose clear and free of lesions, throat mildly erythematous without tonsillar edema or exudates, mucosa pink and moist.   Neck: supple with full range of motion, no thyromegaly, no lymphadenopathy  Cardiovascular: regular rate and rhythm, normal S1 and S2, no murmurs, rubs or gallops, peripheral  pulses full and symmetric, cap refill <2 sec  Respiratory: clear to auscultation with good air movement bilaterally, mild expiratory wheezes in RUL, no crackles, non-labored    ASSESSMENT/PLAN:    1. Cough  Reviewed using OTC cough syrups to help with cough, may also try Tessalon TID to help with spasmatic cough. Reviewed swallowing whole. Discussed lungs are without crackles, and no fevers are reassuring for no pneumonia.  - benzonatate (TESSALON) 100 MG capsule; Take 1 capsule (100 mg) by mouth 3 times daily as needed for cough  Dispense: 42 capsule; Refill: 0    2. Acute bronchospasm  Discussed albuterol 2 puffs q 6 hr for wheezing, and recommended starting daily allergy medicine like Zyrtec or Allegra to help prevent cough related to seasonal allergies.   - albuterol (VENTOLIN HFA) 108 (90 BASE) MCG/ACT Inhaler; Inhale 2 puffs into the lungs every 6 hours as needed for shortness of breath / dyspnea or wheezing  Dispense: 1 Inhaler; Refill: 0    FOLLOW UP: If not improving or if worsening  LYNDA Jose CNP

## 2017-04-14 NOTE — PATIENT INSTRUCTIONS
[facility] Medication Refill Request Information:  * Please contact your pharmacy regarding ANY request for medication refills.  ** [facility] Prescription Fax = [phone]  * Please allow 3 business days for routine medication refills.  * Please allow 5 business days for controlled substance medication refills.     [facility] Test Result notification information:  *You will be notified with in 7-10 days of your appointment day regarding the results of your test.  If you are on MyChart you will be notified as soon as the provider has reviewed the results and signed off on them.    [facility] Phone Number [phone]      Adult Self-Care for Colds  Colds are caused by viruses. They can t be cured with antibiotics. However, you can relieve symptoms and support your body s efforts to heal itself. No matter which symptoms you have, be sure to drink plenty of fluids (water or clear soup); stop smoking and drinking alcohol; and get plenty of rest.   Understand a fever    Take your temperature several times a day. If your fever is 100.4 F (38.0 C) for more than a day, call your doctor.    Relax, lie down. Go to bed if you want. Just get off your feet and rest. Also, drink plenty of fluids to avoid dehydration.    Take acetaminophen or a nonsteroidal anti-inflammatory agent (NSAID), such as ibuprofen.  Treat a troubled nose kindly    Breathe steam or heated humidified air to open blocked nasal passages.  a hot shower or use a vaporizer. Be careful not to get burned by the steam.    Saline nasal sprays and decongestant tablets help open a stuffy nose. Antihistamines can also help, but they can cause side effects such as drowsiness and drying of the eyes, nose, and mouth.  Soothe a sore throat and cough    Gargle every 2 hours with 1/4 teaspoon of salt dissolved in 1/2 cup of warm water. Suck on throat lozenges and cough drops to moisten your throat.    Cough medicines are available but it is  unclear how effective they actually are.    Take acetaminophen or an NSAID, such as ibuprofen to ease throat pain  Ease digestive problems    Put fluid back into your body. Take frequent sips of clear liquids such as water or broth. Do not drink beverages with a lot of sugar in them, such as juices and sodas. These can make diarrhea worse. Older children and adults can drink sports drinks.    As your appetite returns, you can resume your normal diet. Ask your doctor whether there are any foods you should avoid.     When to seek medical care  When you first notice symptoms, ask your health care provider if antiviral medications are appropriate. Antibiotics should not be taken for colds or flu. Also, call your doctor if you have any of the following symptoms or if you aren t feeling better after 7 days:    Shortness of breath    Pain or pressure in the chest or abdomen    Worsening symptoms, especially after a period of improvement    Fever of 100.4 F  (38.0 C) or higher, or fever that doesn t go down with medication    Sudden dizziness or confusion    Severe or continued vomiting    Signs of dehydration, including extreme thirst, dark urine, infrequent urination, dry mouth    Spotted, red, or very sore throat        0245-8025 [employer]. [address] [place] [address], [place] [address]. All rights reserved. This information is not intended as a substitute for professional medical care. Always follow your healthcare professional's instructions.

## 2017-04-20 NOTE — PROGRESS NOTES
"Health Psychology                                      Department of Medicine                                           [facility] Mail Code 741    Edwina Lara, Ph.D., L.P. [phone]  [address] [place] [address], [facility] Luzma Oleary, Ph.D.,  L.P. [phone]  [place] [address]  Med Hansen, Ph.D., A.B.JOAO., L.P. [phone]       Joyce Huerta, PhD, LP [phone]  ________________________________________________________________________________________________  Health Psychology - Follow up Visit  Confidential Summary*    REFERRAL SOURCE  Self    CHIEF COMPLAINT/REASON FOR VISIT  Patient seen for weekly CBT session.      Subjective:  Patient began with report that she is excited at the prospect of moving into her own apartment in a part of the community where she used to live and that she has always wanted to return. Patient reported that she and her ex- have discussed her move and she has given her approval that the house be sold when she moves out. She reported that she believes this move will allow her to return to her authentic self.  Patient able to express belief that she purposely suppressed her \"real self\" in order to support her  and so that she would be able to be respected by his family.  She described multiple difficult encounters in which she felt invalidated.    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      Patient continues to describe some on line dating encounters.  "     Objective:  Patient was on time for today s session, appropriately groomed and dressed, and demonstrated good eye contact.  She appeared alert and oriented.   Mood was euthymic and the patients affect bright.  Patient adamantly denied suicidal or assaultive ideation, plan, or intent.       Assessment:  The patients continues to describe making some positive choices to begin a new chapter in her life following a divorce, relocation and loss of business prospects.    Plan: She will be seen in two weeks.          Time In: 2:00  Time Out: 3:00    Diagnosis:  Axis I MDD   Axis II Deferred   Axis III Epilepsy, see medical record   Axis IV Psychosocial and Environmental Stressors: work related     Joyce Huerta, Ph.D., L.P.      *In accordance with the Rules of the [place] [employer] of [employer], it is noted that psychological descriptions and scientific procedures underlying psychological evaluations have limitations.  Absolute predictions cannot be made based on information in this report.

## 2017-05-02 DIAGNOSIS — R56.9 CONVULSIONS (H): ICD-10-CM

## 2017-05-03 ENCOUNTER — OFFICE VISIT (OUTPATIENT)
Dept: PSYCHOLOGY | Facility: CLINIC | Age: 42
End: 2017-05-03

## 2017-05-03 DIAGNOSIS — F33.0 MAJOR DEPRESSIVE DISORDER, RECURRENT, MILD (H): Primary | ICD-10-CM

## 2017-05-03 NOTE — MR AVS SNAPSHOT
After Visit Summary   5/3/2017    Mellisa Thompson    MRN: [id number]           Patient Information     Date Of Birth          1975        Visit Information        Provider Department      5/3/2017 2:00 PM Joyce Huerta, PhD NIYAH [facility]        Today's Diagnoses     Major depressive disorder, recurrent, mild (H)    -  1       Follow-ups after your visit        Your next 10 appointments already scheduled     May 31, 2017  2:00 PM CDT   (Arrive by 1:45 PM)   Return Visit with Joyce Huerta, PhD NIYAH   [facility] ([facility] [place])    [address] [place] [id number] [phone]              Who to contact     Please call your clinic at [phone] to:    Ask questions about your health    Make or cancel appointments    Discuss your medicines    Learn about your test results    Speak to your doctor   If you have compliments or concerns about an experience at your clinic, or if you wish to file a complaint, please contact [facility] Patient Relations at [phone] or email us at [email or web address]         Additional Information About Your Visit        MyChart Information     [employer] is an electronic gateway that provides easy, online access to your medical records. With [employer], you can request a clinic appointment, read your test results, renew a prescription or communicate with your care team.     To sign up for [employer] visit the website at [email or web address]   You will be asked to enter the access code listed below, as well as some personal information. Please follow the directions to create your username and password.     Your access code is: [email or web address]  Expires: 2017  2:28 PM     Your access code will  in 90 days. If you need help or a new code, please contact your [facility] or call [phone] for assistance.         Care EveryWhere ID     This is your Care EveryWhere ID. This could be used by other organizations to access your [place] medical records  [id number]        Your Vitals Were     Last Period                   04/10/2017 (Exact Date)            Blood Pressure from Last 3 Encounters:   04/14/17 119/76   01/25/17 124/69   12/20/16 110/70    Weight from Last 3 Encounters:   04/14/17 76.2 kg (167 lb 14.4 oz)   01/25/17 78.7 kg (173 lb 6.4 oz)   12/20/16 77 kg (169 lb 12.8 oz)              Today, you had the following     No orders found for display       Primary Care Provider Office Phone # Fax #    Ree Michele [phone]       [facility] [address] [address]        Thank you!     Thank you for choosing [facility]  for your care. Our goal is always to provide you with excellent care. Hearing back from our patients is one way we can continue to improve our services. Please take a few minutes to complete the written survey that you may receive in the mail after your visit with us. Thank you!             Your Updated Medication List - Protect others around you: Learn how to safely use, store and throw away your medicines at www.disposemymeds.org.          This list is accurate as of: 5/3/17 11:59 PM.  Always use your most recent med list.                   Brand Name Dispense Instructions for use    albuterol 108 (90 BASE) MCG/ACT Inhaler    VENTOLIN HFA    1 Inhaler    Inhale 2 puffs into the lungs every 6 hours as needed for shortness of breath / dyspnea or wheezing       ALPRAZolam 0.25 MG tablet    XANAX     Take 1 tablet (0.25 mg) by mouth 3 times daily as needed for anxiety       benzonatate 100 MG capsule    TESSALON    42 capsule    Take 1 capsule (100 mg) by mouth 3 times daily as needed for cough       levETIRAcetam 1000 MG Tabs     180 tablet    Take 1,000 mg by mouth 2 times daily       minoxidil 5 % Soln     60 mL    Apply  topically once daily to entire scalp. .       OMEGA III EPA+DHA 1000 MG Caps     90 capsule    Take one twice a day       order for DME     1 Box    Equipment being ordered: Light box 10,000 Lux for seasonal affective disorder code F39       PREPLUS 27-1 MG Tabs     90 tablet    TAKE 1 TABLET BY MOUTH ONCE DAILY       sertraline 50 MG tablet    ZOLOFT    90 tablet    Take 1 tablet (50 mg) by mouth daily       triamcinolone 0.1 % ointment    KENALOG    80 g    Apply to affected chest up to twice daily as needed.       vitamin D 2000 UNITS tablet     90 tablet    Take 1 tablet by mouth daily Take one tablet daily.

## 2017-05-10 NOTE — TELEPHONE ENCOUNTER
Seen Dr Howell inpatient  on 08/26/16  Spoke with patient and asked her to make follow up appointment   for refill request of Keppra-  She agreed to make follow up appointment with Dr Howell and has 15 days left of the medication

## 2017-05-15 DIAGNOSIS — J98.01 ACUTE BRONCHOSPASM: ICD-10-CM
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
## 2017-05-17 RX ORDER — ALBUTEROL SULFATE 90 UG/1
AEROSOL, METERED RESPIRATORY (INHALATION)
Qty: 18 G | Refills: 0 | OUTPATIENT
Start: 2017-05-17

## 2017-05-23 RX ORDER — LEVETIRACETAM 1000 MG/1
TABLET ORAL
Qty: 60 TABLET | Refills: 0 | OUTPATIENT
Start: 2017-05-23

## 2017-05-26 DIAGNOSIS — R56.9 CONVULSIONS, UNSPECIFIED CONVULSION TYPE (H): Primary | ICD-10-CM

## 2017-05-26 RX ORDER — LEVETIRACETAM 1000 MG/1
1000 TABLET ORAL 2 TIMES DAILY
Qty: 60 TABLET | Refills: 0 | Status: SHIPPED | OUTPATIENT
Start: 2017-05-26 | End: 2017-06-16

## 2017-05-26 NOTE — TELEPHONE ENCOUNTER
Drug Name: levetiracetam        Dose:1000 MG   generic  SIG: Take 1 tab twice daily                                  Days Supply Needed: 4 wk      Pharmacy: [facility] [address] [place] [address]  AT [facility]    Date of Last Appointment: 8/26/16  Next RTC Date: 6/16/17  Has a script already been sent recently: No    Additonal Notes:

## 2017-05-31 ENCOUNTER — OFFICE VISIT (OUTPATIENT)
Dept: PSYCHOLOGY | Facility: CLINIC | Age: 42
End: 2017-05-31

## 2017-05-31 DIAGNOSIS — F43.23 ADJUSTMENT DISORDER WITH MIXED ANXIETY AND DEPRESSED MOOD: Primary | ICD-10-CM

## 2017-05-31 NOTE — MR AVS SNAPSHOT
After Visit Summary   5/31/2017    Mellisa Thompson    MRN: [id number]           Patient Information     Date Of Birth          1975        Visit Information        Provider Department      5/31/2017 3:00 PM Joyce Huerta, PhD [facility]        Today's Diagnoses     Adjustment disorder with mixed anxiety and depressed mood    -  1       Follow-ups after your visit        Your next 10 appointments already scheduled     Jun 16, 2017  8:30 AM CDT   (Arrive by 8:15 AM)   Return Seizure with Brody Mohan MD   [facility] ([facility])    [address] [place] [id number] [phone]            Jun 28, 2017  3:00 PM CDT   (Arrive by 2:45 PM)   Return Visit with Joyce Huerta PhD LP   [facility] ([facility])    [address] [place] [address] [phone]              Who to contact     Please call your clinic at [phone] to:    Ask questions about your health    Make or cancel appointments    Discuss your medicines    Learn about your test results    Speak to your doctor   If you have compliments or concerns about an experience at your clinic, or if you wish to file a complaint, please contact [facility] Patient Relations at [phone] or email us at [email or web address]         Additional Information About Your Visit        MyChart Information     AirCast Mobilet is an electronic gateway that provides easy, online access to your medical records. With indico, you can request a clinic appointment, read your test results, renew a prescription or communicate with your care team.     To sign up for AirCast Mobilet visit the website at [email or web address]   You will be asked to enter the access code listed below, as well as some personal information. Please follow the directions to create your username and password.      Your access code is: [email or web address]  Expires: 2017  2:28 PM     Your access code will  in 90 days. If you need help or a new code, please contact your [facility] or call [phone] for assistance.        Care EveryWhere ID     This is your Care EveryWhere ID. This could be used by other organizations to access your [place] medical records  [id number]         Blood Pressure from Last 3 Encounters:   17 119/76   17 124/69   16 110/70    Weight from Last 3 Encounters:   17 76.2 kg (167 lb 14.4 oz)   17 78.7 kg (173 lb 6.4 oz)   16 77 kg (169 lb 12.8 oz)              Today, you had the following     No orders found for display       Primary Care Provider Office Phone # Fax #    Ree Michele [phone]       [facility] [address] [place] [address]        Thank you!     Thank you for choosing [facility]IMARY [facility]ARE [facility]INIC  for your care. Our goal is always to provide you with excellent care. Hearing back from our patients is one way we can continue to improve our services. Please take a few minutes to complete the written survey that you may receive in the mail after your visit with us. Thank you!             Your Updated Medication List - Protect others around you: Learn how to safely use, store and throw away your medicines at www.disposemymeds.org.          This list is accurate as of: 17 11:59 PM.  Always use your most recent med list.                   Brand Name Dispense Instructions for use    albuterol 108 (90 BASE) MCG/ACT Inhaler    VENTOLIN HFA    1 Inhaler    Inhale 2 puffs into the lungs every 6 hours as needed for shortness of breath / dyspnea or wheezing       ALPRAZolam 0.25 MG tablet    XANAX     Take 1 tablet (0.25 mg) by mouth 3 times daily as needed for anxiety       benzonatate 100 MG capsule    TESSALON    42 capsule    Take 1 capsule (100 mg) by mouth 3 times  daily as needed for cough       levETIRAcetam 1000 MG Tabs     60 tablet    Take 1,000 mg by mouth 2 times daily       minoxidil 5 % Soln     60 mL    Apply topically once daily to entire scalp. .       OMEGA III EPA+DHA 1000 MG Caps     90 capsule    Take one twice a day       order for DME     1 Box    Equipment being ordered: Light box 10,000 Lux for seasonal affective disorder code F39       PREPLUS 27-1 MG Tabs     90 tablet    TAKE 1 TABLET BY MOUTH ONCE DAILY       sertraline 50 MG tablet    ZOLOFT    90 tablet    Take 1 tablet (50 mg) by mouth daily       triamcinolone 0.1 % ointment    KENALOG    80 g    Apply to affected chest up to twice daily as needed.       vitamin D 2000 UNITS tablet     90 tablet    Take 1 tablet by mouth daily Take one tablet daily.

## 2017-06-08 NOTE — PROGRESS NOTES
"Health Psychology                                      Department of Medicine                                           [facility] Mail Code [id number]    Edwina Lara, Ph.D., L.P. [phone]  [address] [place] [address], [facility] Luzma Oleary, Ph.D.,  L.P. [phone]  [place] [address]  Med Hansen, Ph.D., A.B.CARLI.CARLI., L.P. [phone]       Joyce Huerta, PhD, LP [phone]  ________________________________________________________________________________________________  Health Psychology - Follow up Visit  Confidential Summary*    REFERRAL SOURCE  Self    CHIEF COMPLAINT/REASON FOR VISIT  Patient seen for weekly CBT session.      Subjective:  Patient began with report that she is now officially in her new apartment and is enjoying making intentional purchases for the small space. She described the observation that she was not able to fill her home with the things she enjoyed i the past so she is enjoying this process.  She reported that she has had several \"ah ha\" moments surrounding the parts of herself that she believes she sacrificed in order to maintain her marriage.     She reported that her mood has been bright and she is looking forward to making new relationships in her new community.  She continues to describe optimism for her project.  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      She described attending a recent social event and enjoying socializing.        Objective:  Patient was on time for today s session, appropriately groomed and dressed, and demonstrated good eye contact.  She appeared alert and oriented.   Mood was euthymic and the patients affect bright.  Patient adamantly denied suicidal or assaultive ideation, plan, or intent.       Assessment:  The patients continues to describe making some " positive choices to begin a new chapter in her life following relocation and final separation from all ties to her former .     Plan: She will be seen in two weeks.          Time In: 2:00  Time Out: 3:00    Diagnosis:  Axis I MDD   Axis II Deferred   Axis III Epilepsy, see medical record   Axis IV Psychosocial and Environmental Stressors: work related, move     Joyce Huerta, Ph.D., L.P.      *In accordance with the Rules of the [place] Board of [employer], it is noted that psychological descriptions and scientific procedures underlying psychological evaluations have limitations.  Absolute predictions cannot be made based on information in this report.

## 2017-06-16 ENCOUNTER — OFFICE VISIT (OUTPATIENT)
Dept: NEUROLOGY | Facility: CLINIC | Age: 42
End: 2017-06-16

## 2017-06-16 VITALS
HEART RATE: 71 BPM | WEIGHT: 172 LBS | DIASTOLIC BLOOD PRESSURE: 63 MMHG | HEIGHT: 64 IN | BODY MASS INDEX: 29.37 KG/M2 | SYSTOLIC BLOOD PRESSURE: 121 MMHG

## 2017-06-16 DIAGNOSIS — R56.9 CONVULSIONS, UNSPECIFIED CONVULSION TYPE (H): ICD-10-CM

## 2017-06-16 LAB
ALBUMIN SERPL-MCNC: 3.8 G/DL (ref 3.4–5)
ALP SERPL-CCNC: 69 U/L (ref 40–150)
ALT SERPL W P-5'-P-CCNC: 22 U/L (ref 0–50)
ANION GAP SERPL CALCULATED.3IONS-SCNC: 7 MMOL/L (ref 3–14)
AST SERPL W P-5'-P-CCNC: 12 U/L (ref 0–45)
BASOPHILS # BLD AUTO: 0.1 10E9/L (ref 0–0.2)
BASOPHILS NFR BLD AUTO: 0.8 %
BILIRUB SERPL-MCNC: 0.5 MG/DL (ref 0.2–1.3)
BUN SERPL-MCNC: 17 MG/DL (ref 7–30)
CALCIUM SERPL-MCNC: 9.2 MG/DL (ref 8.5–10.1)
CHLORIDE SERPL-SCNC: 107 MMOL/L (ref 94–109)
CO2 SERPL-SCNC: 26 MMOL/L (ref 20–32)
CREAT SERPL-MCNC: 0.65 MG/DL (ref 0.52–1.04)
DIFFERENTIAL METHOD BLD: NORMAL
EOSINOPHIL # BLD AUTO: 0.1 10E9/L (ref 0–0.7)
EOSINOPHIL NFR BLD AUTO: 1 %
ERYTHROCYTE [DISTWIDTH] IN BLOOD BY AUTOMATED COUNT: 12.6 % (ref 10–15)
ERYTHROCYTE [SEDIMENTATION RATE] IN BLOOD BY WESTERGREN METHOD: 9 MM/H (ref 0–20)
GFR SERPL CREATININE-BSD FRML MDRD: NORMAL ML/MIN/1.7M2
GLUCOSE SERPL-MCNC: 93 MG/DL (ref 70–99)
HCT VFR BLD AUTO: 39.8 % (ref 35–47)
HGB BLD-MCNC: 13 G/DL (ref 11.7–15.7)
IMM GRANULOCYTES # BLD: 0 10E9/L (ref 0–0.4)
IMM GRANULOCYTES NFR BLD: 0.3 %
LYMPHOCYTES # BLD AUTO: 1.8 10E9/L (ref 0.8–5.3)
LYMPHOCYTES NFR BLD AUTO: 24.4 %
MCH RBC QN AUTO: 29.6 PG (ref 26.5–33)
MCHC RBC AUTO-ENTMCNC: 32.7 G/DL (ref 31.5–36.5)
MCV RBC AUTO: 91 FL (ref 78–100)
MONOCYTES # BLD AUTO: 0.4 10E9/L (ref 0–1.3)
MONOCYTES NFR BLD AUTO: 5.3 %
NEUTROPHILS # BLD AUTO: 4.9 10E9/L (ref 1.6–8.3)
NEUTROPHILS NFR BLD AUTO: 68.2 %
NRBC # BLD AUTO: 0 10*3/UL
NRBC BLD AUTO-RTO: 0 /100
PLATELET # BLD AUTO: 226 10E9/L (ref 150–450)
POTASSIUM SERPL-SCNC: 3.8 MMOL/L (ref 3.4–5.3)
PROT SERPL-MCNC: 6.9 G/DL (ref 6.8–8.8)
RBC # BLD AUTO: 4.39 10E12/L (ref 3.8–5.2)
SODIUM SERPL-SCNC: 140 MMOL/L (ref 133–144)
WBC # BLD AUTO: 7.2 10E9/L (ref 4–11)

## 2017-06-16 RX ORDER — LEVETIRACETAM 1000 MG/1
1000 TABLET ORAL 2 TIMES DAILY
Qty: 60 TABLET | Refills: 11 | Status: SHIPPED | OUTPATIENT
Start: 2017-06-16 | End: 2017-06-16

## 2017-06-16 RX ORDER — LEVETIRACETAM 1000 MG/1
1000 TABLET ORAL 2 TIMES DAILY
Qty: 180 TABLET | Refills: 3 | Status: SHIPPED | OUTPATIENT
Start: 2017-06-16 | End: 2018-06-12

## 2017-06-16 ASSESSMENT — ENCOUNTER SYMPTOMS
BACK PAIN: 0
JOINT SWELLING: 0
NECK PAIN: 0
STIFFNESS: 0
MUSCLE WEAKNESS: 1
DOUBLE VISION: 0
EYE WATERING: 0
EYE IRRITATION: 1
ARTHRALGIAS: 0
MYALGIAS: 0
EYE PAIN: 0
MUSCLE CRAMPS: 0
EYE REDNESS: 1

## 2017-06-16 ASSESSMENT — PAIN SCALES - GENERAL: PAINLEVEL: NO PAIN (0)

## 2017-06-16 NOTE — NURSING NOTE
Chief Complaint   Patient presents with     RECHECK     RSE     Chief Complaint   Patient presents with     RECHECK     RSE     Kalina lay

## 2017-06-16 NOTE — MR AVS SNAPSHOT
After Visit Summary   6/16/2017    Mellisa Thompson    MRN: [id number]           Patient Information     Date Of Birth          1975        Visit Information        Provider Department      6/16/2017 8:30 AM Brody Mohan MD [facility]        Today's Diagnoses     Convulsions, unspecified convulsion type (H)           Follow-ups after your visit        Follow-up notes from your care team     Return in about 1 year (around 6/16/2018).      Your next 10 appointments already scheduled     Jun 16, 2017  9:45 AM CDT   LAB with  LAB   [facility] ([facility])    [address] [place] [address] [phone]           Patient must bring picture ID.  Patient should be prepared to give a urine specimen  Please do not eat 10-12 hours before your appointment if you are coming in fasting for labs on lipids, cholesterol, or glucose (sugar).  Pregnant women should follow their Care Team instructions. Water with medications is okay. Do not drink coffee or other fluids.   If you have concerns about taking  your medications, please ask at office or if scheduling via Tripware, send a message by clicking on Secure Messaging, Message Your Care Team.            Jun 28, 2017  3:00 PM CDT   (Arrive by 2:45 PM)   Return Visit with Joyce Huerta, PhD [facility] ([facility])    [address] [place] [address] [phone]              Future tests that were ordered for you today     Open Future Orders        Priority Expected Expires Ordered    CBC with platelets differential Routine  6/16/2018 6/16/2017    Comprehensive metabolic panel Routine  6/16/2018 6/16/2017    Erythrocyte sedimentation rate auto Routine  6/16/2018 6/16/2017    Antinuclear antibody screen by EIA Routine  6/16/2018 6/16/2017            Who to contact     Please call your clinic at [phone] to:    Ask  "questions about your health    Make or cancel appointments    Discuss your medicines    Learn about your test results    Speak to your doctor   If you have compliments or concerns about an experience at your clinic, or if you wish to file a complaint, please contact [facility] Patient Relations at [phone] or email us at [email or web address]         Additional Information About Your Visit        Salorixhart Information     Adams Armst is an electronic gateway that provides easy, online access to your medical records. With Aequus Technologies, you can request a clinic appointment, read your test results, renew a prescription or communicate with your care team.     To sign up for Aequus Technologies visit the website at [email or web address]   You will be asked to enter the access code listed below, as well as some personal information. Please follow the directions to create your username and password.     Your access code is: [email or web address]  Expires: 2017  2:28 PM     Your access code will  in 90 days. If you need help or a new code, please contact your [facility] or call [phone] for assistance.        Care EveryWhere ID     This is your Care EveryWhere ID. This could be used by other organizations to access your [place] medical records  [id number]        Your Vitals Were     Pulse Height BMI (Body Mass Index)             71 1.613 m (5' 3.5\") 29.99 kg/m2          Blood Pressure from Last 3 Encounters:   17 121/63   17 119/76   16 110/70    Weight from Last 3 Encounters:   17 78 kg (172 lb)   17 76.2 kg (167 lb 14.4 oz)   16 77 kg (169 lb 12.8 oz)              We Performed the Following     Keppra (Levetiracetam) Level          Where to get your medicines      These medications were sent to [id number] - [place], [place] -  [address]   [address], [place] [address]     " Phone:  [phone]     levETIRAcetam 1000 MG Tabs          Primary Care Provider Office Phone # Fax #    Ree Michele [phone]       [facility] [address] [address]        Thank you!     Thank you for choosing [facility] NEUROLOG[facility]  for your care. Our goal is always to provide you with excellent care. Hearing back from our patients is one way we can continue to improve our services. Please take a few minutes to complete the written survey that you may receive in the mail after your visit with us. Thank you!             Your Updated Medication List - Protect others around you: Learn how to safely use, store and throw away your medicines at www.disposemymeds.org.          This list is accurate as of: 6/16/17  9:26 AM.  Always use your most recent med list.                   Brand Name Dispense Instructions for use    albuterol 108 (90 BASE) MCG/ACT Inhaler    VENTOLIN HFA    1 Inhaler    Inhale 2 puffs into the lungs every 6 hours as needed for shortness of breath / dyspnea or wheezing       ALPRAZolam 0.25 MG tablet    XANAX     Take 1 tablet (0.25 mg) by mouth 3 times daily as needed for anxiety       benzonatate 100 MG capsule    TESSALON    42 capsule    Take 1 capsule (100 mg) by mouth 3 times daily as needed for cough       levETIRAcetam 1000 MG Tabs     60 tablet    Take 1,000 mg by mouth 2 times daily       minoxidil 5 % Soln     60 mL    Apply topically once daily to entire scalp. .       OMEGA III EPA+DHA 1000 MG Caps     90 capsule    Take one twice a day       order for DME     1 Box    Equipment being ordered: Light box 10,000 Lux for seasonal affective disorder code F39       PREPLUS 27-1 MG Tabs     90 tablet    TAKE 1 TABLET BY MOUTH ONCE DAILY       triamcinolone 0.1 % ointment    KENALOG    80 g    Apply to affected chest up to twice daily as needed.       vitamin D 2000 UNITS tablet     90 tablet    Take 1 tablet by mouth daily Take one  tablet daily.

## 2017-06-16 NOTE — LETTER
2017       RE: Mellisa Thompson  [address]  [address]  [place] [address]     Dear Colleague,    Thank you for referring your patient, Mellisa Thompson, to the [facility] at [facility]. Please see a copy of my visit note below.    2017      Ree Michele MD   [facility]    [address] [place] [address] [address]   [place] [address]      RE: Mellisa Thompson   MRN: [id number]   : 1975      Dear Dr. Michele:      Ms. Mellisa Thompson is a 41-year-old status a very successful right temporal lobectomy.  Ms. Thompson comes in for her yearly followup regarding her seizures.  Since she was last seen last year, she said she has very rare auras.  She has been maintained on anticonvulsant consisting of levetiracetam 1000 twice a day because of these auras.      Her last level by our lab in July of last year was 21.2.      As far as other issues, she is getting treatment for anxiety and depression and adjustment disorder.  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  She is .  She is not in relation and her permanent  is getting .  She has had some complaints of hair loss, seen in Dermatology and they give her some creams.  She is insistent about it.  We will consider this at some point and maybe she would need a change in medication.  She has been on Tegretol before, so we could move her there.  In the meantime, she is encouraged to continue treating her with Dermatology.  She also complained of blurred vision.      PHYSICAL EXAMINATION:     VITAL SIGNS:    Her blood pressure is 121/63.  Pulse 71.   NEUROLOGIC:   Her funduscopic exam is completely normal.      In summary, she much improved with anxiety treatment and she has had very rare auras, hair loss.  I do not think it is related to levetiracetam or do not see any other drugs that could do it.  We will see how that evolves.  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      Sincerely,       Brody Mohan MD

## 2017-06-17 LAB — LEVETIRACETAM SERPL-MCNC: 25 UG/ML

## 2017-06-17 NOTE — PROGRESS NOTES
2017      Ree Michele MD   [facility]    [address]   [place] [address]      RE: Mellisa Wu   MRN: [id number]   : 1975      Dear Dr. Michele:      Ms. Mellisa Wu is a 41-year-old status a very successful right temporal lobectomy.  Ms. Wu comes in for her yearly followup regarding her seizures.  Since she was last seen last year, she said she has very rare auras.  She has been maintained on anticonvulsant consisting of levetiracetam 1000 twice a day because of these auras.      Her last level by our lab in July of last year was 21.2.      As far as other issues, she is getting treatment for anxiety and depression and adjustment disorder.  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  She is .  She is not in relation and her permanent  is getting .  She has had some complaints of hair loss, seen in Dermatology and they give her some creams.  She is insistent about it.  We will consider this at some point and maybe she would need a change in medication.  She has been on Tegretol before, so we could move her there.  In the meantime, she is encouraged to continue treating her with Dermatology.  She also complained of blurred vision.      PHYSICAL EXAMINATION:     VITAL SIGNS:    Her blood pressure is 121/63.  Pulse 71.   NEUROLOGIC:   Her funduscopic exam is completely normal.      In summary, she much improved with anxiety treatment and she has had very rare auras, hair loss.  I do not think it is related to levetiracetam or do not see any other drugs that could do it.  We will see how that evolves.  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      Sincerely,       MD SID Dean MD             D: 2017 09:43   T: 2017 06:50   MT: AKA      Name:     MELLISA WU   MRN:      [id number]         Account:      [id number]   :      1975           Service Date: 2017      Document: [id number]

## 2017-06-19 LAB — ANA SER QL IA: NORMAL

## 2017-07-05 ENCOUNTER — OFFICE VISIT (OUTPATIENT)
Dept: PSYCHOLOGY | Facility: CLINIC | Age: 42
End: 2017-07-05

## 2017-07-05 DIAGNOSIS — F33.0 MAJOR DEPRESSIVE DISORDER, RECURRENT EPISODE, MILD WITH SEASONAL PATTERN (H): Primary | ICD-10-CM

## 2017-07-05 NOTE — MR AVS SNAPSHOT
After Visit Summary   7/5/2017    Mellisa Thompson    MRN: [id number]           Patient Information     Date Of Birth          1975        Visit Information        Provider Department      7/5/2017 2:00 PM Joyce Huerta, PhD [facility] [place]        Today's Diagnoses     Major depressive disorder, recurrent episode, mild with seasonal pattern (H)    -  1       Follow-ups after your visit        Your next 10 appointments already scheduled     Aug 02, 2017  2:00 PM CDT   (Arrive by 1:45 PM)   Return Visit with Joyce Huerta, PhD NIYAH   [facility] ([facility])    [address] [place] [id number] [phone]            Jun 19, 2018 10:00 AM CDT   (Arrive by 9:45 AM)   Return Seizure with Brody Mohan MD   [facility] ([facility])    [address] [place] [id number] [phone]              Who to contact     Please call your clinic at [phone] to:    Ask questions about your health    Make or cancel appointments    Discuss your medicines    Learn about your test results    Speak to your doctor   If you have compliments or concerns about an experience at your clinic, or if you wish to file a complaint, please contact [facility] Patient Relations at [phone] or email us at [email or web address]         Additional Information About Your Visit        MyChart Information     VONTRAVELt is an electronic gateway that provides easy, online access to your medical records. With Beyond Alpha, you can request a clinic appointment, read your test results, renew a prescription or communicate with your care team.     To sign up for VONTRAVELt visit the website at [email or web address]   You will be asked to enter the access code listed below, as well as some personal information. Please follow the directions to create your  username and password.     Your access code is: [email or web address]  Expires: 10/17/2017  5:57 AM     Your access code will  in 90 days. If you need help or a new code, please contact your [facility] or call [phone] for assistance.        Care EveryWhere ID     This is your Care EveryWhere ID. This could be used by other organizations to access your [place] medical records  [id number]         Blood Pressure from Last 3 Encounters:   17 121/63   17 119/76   17 124/69    Weight from Last 3 Encounters:   17 78 kg (172 lb)   17 76.2 kg (167 lb 14.4 oz)   17 78.7 kg (173 lb 6.4 oz)              Today, you had the following     No orders found for display       Primary Care Provider Office Phone # Fax #    Ree Michele [phone]       [facility] [address] [facility] [address]  [facility] [address]        Equal Access to Services     [facility]: Hadii aad ku hadasho Soomaali, waaxda luqadaha, qaybta kaalmada ademontezyasaurav, rosio zarate . So [facility] [phone].    ATENCIÓN: Si habla español, tiene a farah disposición servicios gratuitos de asistencia lingüística. Christopherame al [phone].    We comply with applicable federal civil rights laws and [place] laws. We do not discriminate on the basis of race, color, national origin, age, disability sex, sexual orientation or gender identity.            Thank you!     Thank you for choosing [facility] PRIMARY CARE CLINIC  for your care. Our goal is always to provide you with excellent care. Hearing back from our patients is one way we can continue to improve our services. Please take a few minutes to complete the written survey that you may receive in the mail after your visit with us. Thank you!             Your Updated Medication List - Protect others around you: Learn how to safely use, store and throw away your medicines at www.disposemymeds.org.           This list is accurate as of: 7/5/17 11:59 PM.  Always use your most recent med list.                   Brand Name Dispense Instructions for use Diagnosis    albuterol 108 (90 BASE) MCG/ACT Inhaler    VENTOLIN HFA    1 Inhaler    Inhale 2 puffs into the lungs every 6 hours as needed for shortness of breath / dyspnea or wheezing    Acute bronchospasm       ALPRAZolam 0.25 MG tablet    XANAX     Take 1 tablet (0.25 mg) by mouth 3 times daily as needed for anxiety        benzonatate 100 MG capsule    TESSALON    42 capsule    Take 1 capsule (100 mg) by mouth 3 times daily as needed for cough    Cough       levETIRAcetam 1000 MG Tabs     180 tablet    Take 1,000 mg by mouth 2 times daily    Convulsions, unspecified convulsion type (H)       minoxidil 5 % Soln     60 mL    Apply topically once daily to entire scalp. .    Loss of hair       OMEGA III EPA+DHA 1000 MG Caps     90 capsule    Take one twice a day    Seizure disorder (H)       order for DME     1 Box    Equipment being ordered: Light box 10,000 Lux for seasonal affective disorder code F39    Seasonal affective disorder (H)       PREPLUS 27-1 MG Tabs     90 tablet    TAKE 1 TABLET BY MOUTH ONCE DAILY    Other iron deficiency anemia       triamcinolone 0.1 % ointment    KENALOG    80 g    Apply to affected chest up to twice daily as needed.    Rash       vitamin D 2000 UNITS tablet     90 tablet    Take 1 tablet by mouth daily Take one tablet daily.    Vitamin D deficiency

## 2017-07-19 PROBLEM — F33.0: Status: ACTIVE | Noted: 2017-07-19

## 2017-07-19 NOTE — PROGRESS NOTES
Health Psychology                                      Department of Medicine                                           [facility] Mail Code 741    Edwina Lara, Ph.D., L.P. [phone]  [address] [place] [address], [facility] Luzma Oleary, Ph.D.,  L.P. [phone]  [place] [address]  Med Hansen, Ph.D., A.MACIE.ZAC, L.P. [phone]       Joyce Huerta, PhD, LP [phone]  ________________________________________________________________________________________________  Health Psychology - Follow up Visit  Confidential Summary*    REFERRAL SOURCE  Self    CHIEF COMPLAINT/REASON FOR VISIT  Patient seen for weekly CBT session.      Subjective:  Patient seen for monthly visit.  She was radiant in session today.  The seasonal pattern of her depression and the weight of her marriage and stress of being in a job that is unfulfilling is evident today.      She is off work for the summer months and recently returned from a spontaneous visit to [place] to visit an old boyfriend while he was in the country on a business trip.  She reported that the two had a good relationship while living in [place] but they broke up when he returned to [facility] and she relocated back to the [facility].  She reported that their friendship remains strong and that she noted the difference between the way she is treated and feels with him versus the dynamic in her marriage.  She reported that her marriage drained her energy and desire to pursue her goals and the relationship with Predip is characterized by a similar energy and passion for life accompanied by support, encouragement and a broad world perspective.      She reported that she also enjoyed visiting the [employer] and is considering pursuing a graduate degree in the neurosciences there.  Encouraged her to continue to research the offering of the programs and how she would like to utilize a degree to obtain a career she  might be passionate about.        Objective:  Patient was on time for today s session, appropriately groomed and dressed, and demonstrated good eye contact.  She appeared alert and oriented.   Mood was happy and optimistic and the patients affect bright.  Patient adamantly denied suicidal or assaultive ideation, plan, or intent.     Assessment:  The patients continues to describe making positive changes to begin a new chapter in her life following relocation and final separation from all ties to her former .     Plan: She will be seen in fourweeks.          Time In: 2:00  Time Out: 3:00    Diagnosis:  [place] I MDD, resolving, seasonal pattern   Axis II Deferred   Axis III Epilepsy, see medical record   Axis IV Psychosocial and Environmental Stressors: work related, move     Joyce Huerta, Ph.D., L.P.      *In accordance with the Rules of the [place] Board of Psychology, it is noted that psychological descriptions and scientific procedures underlying psychological evaluations have limitations.  Absolute predictions cannot be made based on information in this report.

## 2017-08-28 ENCOUNTER — RADIANT APPOINTMENT (OUTPATIENT)
Dept: MAMMOGRAPHY | Facility: CLINIC | Age: 42
End: 2017-08-28
Attending: FAMILY MEDICINE
Payer: COMMERCIAL

## 2017-08-28 DIAGNOSIS — Z12.31 VISIT FOR SCREENING MAMMOGRAM: ICD-10-CM

## 2017-08-28 PROCEDURE — G0202 SCR MAMMO BI INCL CAD: HCPCS

## 2017-09-06 ENCOUNTER — OFFICE VISIT (OUTPATIENT)
Dept: PSYCHOLOGY | Facility: CLINIC | Age: 42
End: 2017-09-06

## 2017-09-06 ENCOUNTER — RADIANT APPOINTMENT (OUTPATIENT)
Dept: MAMMOGRAPHY | Facility: CLINIC | Age: 42
End: 2017-09-06
Attending: FAMILY MEDICINE

## 2017-09-06 DIAGNOSIS — F33.0 MAJOR DEPRESSIVE DISORDER, RECURRENT EPISODE, MILD WITH SEASONAL PATTERN (H): Primary | ICD-10-CM

## 2017-09-06 DIAGNOSIS — R92.8 ABNORMAL MAMMOGRAM OF LEFT BREAST: ICD-10-CM

## 2017-09-06 NOTE — MR AVS SNAPSHOT
After Visit Summary   9/6/2017    Mellisa Thompson    MRN: [id number]           Patient Information     Date Of Birth          1975        Visit Information        Provider Department      9/6/2017 4:00 PM Joyce Huerta, PhD [facility]        Today's Diagnoses     Major depressive disorder, recurrent episode, mild with seasonal pattern (H)    -  1       Follow-ups after your visit        Your next 10 appointments already scheduled     Sep 21, 2017  3:00 PM CDT   Annual Visit with Ree Michele MD   [facility] ([facility])    [place] [facility]  3rd Flr[address]  [address]  [facility] [address]  [facility] [address]   [phone]            Jun 19, 2018 10:00 AM CDT   (Arrive by 9:45 AM)   Return Seizure with Brody Mohan MD   [facility] ([facility])    [address] [facility]  3rd [facility] [id number] [phone]              Who to contact     Please call your clinic at [phone] to:    Ask questions about your health    Make or cancel appointments    Discuss your medicines    Learn about your test results    Speak to your doctor   If you have compliments or concerns about an experience at your clinic, or if you wish to file a complaint, please contact [facility] Patient Relations at [phone] or email us at [email or web address]         Additional Information About Your Visit        MyChart Information     CombiMatrixt is an electronic gateway that provides easy, online access to your medical records. With [employer], you can request a clinic appointment, read your test results, renew a prescription or communicate with your care team.     To sign up for CombiMatrixt visit the website at [email or web address]   You will be asked to enter the access code listed below, as well as some personal information. Please follow the directions to create your username and  password.     Your access code is: [email or web address]  Expires: 10/17/2017  5:57 AM     Your access code will  in 90 days. If you need help or a new code, please contact your [facility] or call [phone] for assistance.        Care EveryWhere ID     This is your Care EveryWhere ID. This could be used by other organizations to access your [facility] medical records  [id number]         Blood Pressure from Last 3 Encounters:   17 121/63   17 119/76   17 124/69    Weight from Last 3 Encounters:   17 78 kg (172 lb)   17 76.2 kg (167 lb 14.4 oz)   17 78.7 kg (173 lb 6.4 oz)              Today, you had the following     No orders found for display       Primary Care Provider Office Phone # Fax #    Ree Michele [phone]       [address] [place] [address]        Equal Access to Services     [facility]: Hadii aad ku hadasho Soomaali, waaxda luqadaha, qaybta kaalmada adeegyada, waxay idiin hayaan jory zarate . So [facility] [phone].    ATENCIÓN: Si habla español, tiene a farah disposición servicios gratuitos de asistencia lingüística. Llame al [phone].    We comply with applicable federal civil rights laws and [place] laws. We do not discriminate on the basis of race, color, national origin, age, disability sex, sexual orientation or gender identity.            Thank you!     Thank you for choosing [facility]  for your care. Our goal is always to provide you with excellent care. Hearing back from our patients is one way we can continue to improve our services. Please take a few minutes to complete the written survey that you may receive in the mail after your visit with us. Thank you!             Your Updated Medication List - Protect others around you: Learn how to safely use, store and throw away your medicines at www.disposemymeds.org.          This list is accurate as of: 17  11:59 PM.  Always use your most recent med list.                   Brand Name Dispense Instructions for use Diagnosis    albuterol 108 (90 BASE) MCG/ACT Inhaler    VENTOLIN HFA    1 Inhaler    Inhale 2 puffs into the lungs every 6 hours as needed for shortness of breath / dyspnea or wheezing    Acute bronchospasm       ALPRAZolam 0.25 MG tablet    XANAX     Take 1 tablet (0.25 mg) by mouth 3 times daily as needed for anxiety        benzonatate 100 MG capsule    TESSALON    42 capsule    Take 1 capsule (100 mg) by mouth 3 times daily as needed for cough    Cough       levETIRAcetam 1000 MG Tabs     180 tablet    Take 1,000 mg by mouth 2 times daily    Convulsions, unspecified convulsion type (H)       minoxidil 5 % Soln     60 mL    Apply topically once daily to entire scalp. .    Loss of hair       OMEGA III EPA+DHA 1000 MG Caps     90 capsule    Take one twice a day    Seizure disorder (H)       order for DME     1 Box    Equipment being ordered: Light box 10,000 Lux for seasonal affective disorder code F39    Seasonal affective disorder (H)       PREPLUS 27-1 MG Tabs     90 tablet    TAKE 1 TABLET BY MOUTH ONCE DAILY    Other iron deficiency anemia       triamcinolone 0.1 % ointment    KENALOG    80 g    Apply to affected chest up to twice daily as needed.    Rash       vitamin D 2000 UNITS tablet     90 tablet    Take 1 tablet by mouth daily Take one tablet daily.    Vitamin D deficiency

## 2017-09-07 NOTE — PROGRESS NOTES
Health Psychology                                      Department of Medicine                                           [facility] Mail Code 743    Edwina Lara, Ph.D., L.P. [phone]  [address] [place] [address], [facility] Luzma Oleary, Ph.D.,  L.P. [phone]  [place] [address]  Med Hansen, Ph.D., A.B.P.P., L.P. [phone]       Joyce Huerta, PhD, LP [phone]  ________________________________________________________________________________________________  Health Psychology - Follow up Visit  Confidential Summary*    REFERRAL SOURCE  Self    CHIEF COMPLAINT/REASON FOR VISIT  Patient seen for weekly CBT session.      Subjective:  Patient began with jubilant report that she was evaluated today for suspicious mass on her mammogram but was told that it was not cancer.  She reported relief and an awareness that life can change in the blink of an eye and that she wants to live a life that she is happy with.  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  Further, they have partnered her with the teacher that she had difficulties with last school year.  Although she reported a desire to attempt to work with her and a desire to do all she can to tolerate the situation, she was encouraged to also consider that she might be able to ask to be reassigned. Discussed observation that she has an expectation of herself that she is required to accept all that life has to throw at her and that she is not permitted to ask that things be different.  She described a feeling of darkness surrounding her teaching job.  She voiced concern that she may need to be back on medications for the school year, validated her concern and encouraged her to see her provider.    Patient brought in a series of pictures from her trip to [place] last year.  She described a  "feeling of freedom and haley that she experienced during this trip and a desire to find this in her day to day life.  She has had a visit from her mother for much of the summer and described this as enjoyable but is aware that her mother has not, and likely never will, recover from the loss of patients father.  Therefore, she reported that she is not able to lean on her mother and finds herself taking care of her mom.  Discussed the fact that she has never felt taken care of.  She described feeling angry about her fathers death and realizes that anger is an emotion that she has frequently experienced in the past few years.      Patient continues to grapple with fear and a desire to change careers but concern that she will not be able to succeed and will end up with a lot of debt that she is unable to pay.  She was encouraged to approach her desire to attend graduate school ( possibly [employer] and attending a school out of [place]) with a spirit of adventure and curiosity to remove some of the pressure she continues to place on herself.  She is concerned about her ability to excel at the [employer].  She is in the process of applying for a fellowship which will support her efforts to advance epilepsy education in [place].  She is approaching this as a \"if it is meant to be, it will be\" attitude.      Objective:  Patient was on time for today s session, appropriately groomed and dressed, and demonstrated good eye contact.  She appeared alert and oriented.   Mood was happy and optimistic and the patients affect bright.  Patient adamantly denied suicidal or assaultive ideation, plan, or intent.     Assessment:  The patients continues to describe making positive changes to begin a new chapter in her life following relocation and final separation from all ties to her former .     Plan: She will be seen in three weeks.          Time In: 4:00  Time Out: 5:00    Diagnosis:  [place] I MDD, resolving, seasonal pattern   Axis II Deferred   Axis " III Epilepsy, see medical record   Axis IV Psychosocial and Environmental Stressors: work related, allan Huerta, Ph.D., L.P.      *In accordance with the Rules of the [place] Board of Psychology, it is noted that psychological descriptions and scientific procedures underlying psychological evaluations have limitations.  Absolute predictions cannot be made based on information in this report.

## 2017-09-27 ENCOUNTER — OFFICE VISIT (OUTPATIENT)
Dept: PSYCHOLOGY | Facility: CLINIC | Age: 42
End: 2017-09-27

## 2017-09-27 DIAGNOSIS — F33.0 MAJOR DEPRESSIVE DISORDER, RECURRENT EPISODE, MILD WITH SEASONAL PATTERN (H): Primary | ICD-10-CM

## 2017-09-27 NOTE — MR AVS SNAPSHOT
After Visit Summary   9/27/2017    Mellisa Thompson    MRN: [id number]           Patient Information     Date Of Birth          1975        Visit Information        Provider Department      9/27/2017 3:00 PM Joyce Huerta, PhD [facility]        Today's Diagnoses     Major depressive disorder, recurrent episode, mild with seasonal pattern (H)    -  1       Follow-ups after your visit        Your next 10 appointments already scheduled     Oct 24, 2017  3:40 PM CDT   Annual Visit with Ree Michele MD   [facility] ([facility])    [place] [facility]  3rd Flr,[address]  [address]  [facility] [address]  [facility] [address]   [phone]            Jun 19, 2018 10:00 AM CDT   (Arrive by 9:45 AM)   Return Seizure with Brody Mohan MD   [facility] ([facility])    [address] [facility]  [address]rd [facility] [id number] [phone]              Who to contact     Please call your clinic at [phone] to:    Ask questions about your health    Make or cancel appointments    Discuss your medicines    Learn about your test results    Speak to your doctor   If you have compliments or concerns about an experience at your clinic, or if you wish to file a complaint, please contact [facility] Patient Relations at [phone] or email us at [email or web address]         Additional Information About Your Visit        MyChart Information     Publicatet is an electronic gateway that provides easy, online access to your medical records. With [employer], you can request a clinic appointment, read your test results, renew a prescription or communicate with your care team.     To sign up for Publicatet visit the website at [email or web address]   You will be asked to enter the access code listed below, as well as some personal information. Please follow the directions to create your username  and password.     Your access code is: [email or web address]  Expires: 10/17/2017  5:57 AM     Your access code will  in 90 days. If you need help or a new code, please contact your [facility] or call [phone] for assistance.        Care EveryWhere ID     This is your Care EveryWhere ID. This could be used by other organizations to access your [facility] medical records  [id number]         Blood Pressure from Last 3 Encounters:   17 121/63   17 119/76   17 124/69    Weight from Last 3 Encounters:   17 78 kg (172 lb)   17 76.2 kg (167 lb 14.4 oz)   17 78.7 kg (173 lb 6.4 oz)              Today, you had the following     No orders found for display       Primary Care Provider Office Phone # Fax #    Ree Michele [phone]       [address] [place] [address]        Equal Access to Services     [facility]: Hadii aad ku hadasho Soomaali, waaxda luqadaha, qaybta kaalmada adeegyada, waxay idiin hayjewelsn jory zarate . So [facility] [phone].    ATENCIÓN: Si habla español, tiene a farah disposición servicios gratuitos de asistencia lingüística. Llame al [phone].    We comply with applicable federal civil rights laws and [place] laws. We do not discriminate on the basis of race, color, national origin, age, disability, sex, sexual orientation, or gender identity.            Thank you!     Thank you for choosing [facility]  for your care. Our goal is always to provide you with excellent care. Hearing back from our patients is one way we can continue to improve our services. Please take a few minutes to complete the written survey that you may receive in the mail after your visit with us. Thank you!             Your Updated Medication List - Protect others around you: Learn how to safely use, store and throw away your medicines at www.disposemymeds.org.          This list is accurate as of:  9/27/17 11:59 PM.  Always use your most recent med list.                   Brand Name Dispense Instructions for use Diagnosis    albuterol 108 (90 BASE) MCG/ACT Inhaler    VENTOLIN HFA    1 Inhaler    Inhale 2 puffs into the lungs every 6 hours as needed for shortness of breath / dyspnea or wheezing    Acute bronchospasm       ALPRAZolam 0.25 MG tablet    XANAX     Take 1 tablet (0.25 mg) by mouth 3 times daily as needed for anxiety        benzonatate 100 MG capsule    TESSALON    42 capsule    Take 1 capsule (100 mg) by mouth 3 times daily as needed for cough    Cough       levETIRAcetam 1000 MG Tabs     180 tablet    Take 1,000 mg by mouth 2 times daily    Convulsions, unspecified convulsion type (H)       minoxidil 5 % Soln     60 mL    Apply topically once daily to entire scalp. .    Loss of hair       OMEGA III EPA+DHA 1000 MG Caps     90 capsule    Take one twice a day    Seizure disorder (H)       order for DME     1 Box    Equipment being ordered: Light box 10,000 Lux for seasonal affective disorder code F39    Seasonal affective disorder (H)       PREPLUS 27-1 MG Tabs     90 tablet    TAKE 1 TABLET BY MOUTH ONCE DAILY    Other iron deficiency anemia       triamcinolone 0.1 % ointment    KENALOG    80 g    Apply to affected chest up to twice daily as needed.    Rash       vitamin D 2000 UNITS tablet     90 tablet    Take 1 tablet by mouth daily Take one tablet daily.    Vitamin D deficiency

## 2017-09-29 NOTE — PROGRESS NOTES
Health Psychology                                      Department of Medicine                                           [facility] Mail Code 741    Edwina Lara, Ph.D., L.P. [phone]  [address] [place] [address], [facility] Luzma Oleary, Ph.D.,  L.P. [phone]  [place] [address]  Med Hansen, Ph.D., A.B.P.P., L.P. [phone]       Joyce Huerta, PhD, LP [phone]  ________________________________________________________________________________________________  Health Psychology - Follow up Visit  Confidential Summary*    REFERRAL SOURCE  Self    CHIEF COMPLAINT/REASON FOR VISIT  Patient seen for weekly CBT session.      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   Discussed the impact of this decision on their relationship and the importance of boundaries.  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  She reported that she is encouraged by this new opportunity, expecially since she will not have full time hours and better pay.  She continues to describe her desire to relocate to [place] but will only feel conmfortaable making this move if she is accepted into a graduate program and will have an imbedded network of friends.      Described going on her first date since  and was quite jovial in her description. Encouraged patient to consider the possibility that she is attracted to others who may not share her passion or energy and who might be judgemental of her and others.      She reported that she had coffee with her exhusband.    Objective:  Patient was on time for today s session, appropriately groomed and dressed, and demonstrated good eye contact.  She appeared alert and oriented.   Mood was happy and optimistic and the patients affect  bright.  Patient adamantly denied suicidal or assaultive ideation, plan, or intent.       Assessment:  The patients continues to describe making positive changes to begin a new chapter in her life following relocation and final separation from all ties to her former .     Plan: She will be seen again when she calls for appointment.           Time In: 3:00  Time Out: 4:00    Diagnosis:  [place] I MDD, resolving, seasonal pattern   Axis II Deferred   Axis III Epilepsy, see medical record   Axis IV Psychosocial and Environmental Stressors: work related, move     Joyce Huerta, Ph.D., L.P.      *In accordance with the Rules of the [place] Board of Psychology, it is noted that psychological descriptions and scientific procedures underlying psychological evaluations have limitations.  Absolute predictions cannot be made based on information in this report.

## 2017-11-21 NOTE — PROGRESS NOTES
Mellisa is a 89 year old female who presents to [facility] for annual exam    HCM: PAP/HPV 12/2016 negative; mammogram/US 9/2017  Concerns:   iron deficiency anemia: currently off iron supplementation  - feels better on a protein rich diet [meat]  - Good plant based diet in addition  Adjustment disorder with mixed anxiety and depressed mood  -  Off sertraline since March 2017  -  Seeing therapist  Neurologist: Dr. Mohan yearly  -  Seizure hx: on Keppra 1000 MG TABS BID  ROS:  General: low iron   Head/Eyes: none  Ears/Nose/Throat: none  Cardiovascular: none  Respiratory: none  Gastrointestinal: none  Breast: none  Musculoskeletal: none  Skin: none  Neurological: none  Mental Health: feeling ok off sertraline  Endocrine: none  PROBLEM LIST:  Patient Active Problem List   Diagnosis     Convulsions (H)     Sinus tachycardia     Adjustment disorder with mixed anxiety and depressed mood     Seasonal affective disorder (H)     Major depressive disorder, recurrent episode, mild with seasonal pattern (H)   OB/GYN HISTORY:   G0.   Menses: regular  Contraception: none  PAST MEDICAL HISTORY:  Past Medical History:   Diagnosis Date     Anxiety      Depression      Epilepsy (H)    Life Style Modifiers:   Tobacco:  reports that she has never smoked. She has never used smokeless tobacco.   Alcohol:  reports that she drinks alcohol.   Drug use:  reports that she does not use illicit drugs.  Exercise: yoga - core power daily.             Diet: good but eats some meals  PAST SURGICAL HISTORY:  Past Surgical History:   Procedure Laterality Date     ANKLE SURGERY Right 1999     BRAIN SURGERY  2007    right temporal lobectomy    FAMILY HISTORY:  Family History   Problem Relation Age of Onset     Hypertension Mother      CANCER Father      CANCER Paternal Grandfather      DIABETES Maternal Grandmother      Melanoma No family hx of      Skin Cancer No family hx of    SOCIAL HISTORY:  Has family in [place] and moved back in January 2016.  "[place] Works in the [employer] in [place].   MEDICATIONS:  Current Outpatient Prescriptions   Medication Sig Dispense Refill     levETIRAcetam 1000 MG TABS Take 1,000 mg by mouth 2 times daily 180 tablet 3     Cholecalciferol (VITAMIN D) 2000 UNITS tablet Take 1 tablet by mouth daily Take one tablet daily. 90 tablet 3     albuterol (VENTOLIN HFA) 108 (90 BASE) MCG/ACT Inhaler Inhale 2 puffs into the lungs every 6 hours as needed for shortness of breath / dyspnea or wheezing (Patient not taking: Reported on 11/22/2017) 1 Inhaler 0     benzonatate (TESSALON) 100 MG capsule Take 1 capsule (100 mg) by mouth 3 times daily as needed for cough (Patient not taking: Reported on 11/22/2017) 42 capsule 0     Prenatal Vit-Fe Fumarate-FA (PREPLUS) 27-1 MG TABS TAKE 1 TABLET BY MOUTH ONCE DAILY (Patient not taking: Reported on 11/22/2017) 90 tablet 3     order for DME Equipment being ordered: Light box 10,000 Lux for seasonal affective disorder code F39 1 Box 0     Omega-3 Fatty Acids (OMEGA III EPA+DHA) 1000 MG CAPS Take one twice a day (Patient not taking: Reported on 11/22/2017) 90 capsule 1     ALPRAZolam (XANAX) 0.25 MG tablet Take 1 tablet (0.25 mg) by mouth 3 times daily as needed for anxiety       triamcinolone (KENALOG) 0.1 % ointment Apply to affected chest up to twice daily as needed. (Patient not taking: Reported on 11/22/2017) 80 g 1     minoxidil 5 % SOLN Apply topically once daily to entire scalp. . (Patient not taking: Reported on 11/22/2017) 60 mL 0   ALLERGIES:  Review of patient's allergies indicates no known allergies.  VITALS:  /75  Pulse 78  Ht 1.613 m (5' 3.5\")  Wt 79.5 kg (175 lb 4.8 oz)  LMP 11/14/2017  BMI 30.56 kg/m2  Wt Readings from Last 5 Encounters:   11/22/17 79.5 kg (175 lb 4.8 oz)   06/16/17 78 kg (172 lb)   04/14/17 76.2 kg (167 lb 14.4 oz)   01/25/17 78.7 kg (173 lb 6.4 oz)   12/20/16 77 kg (169 lb 12.8 oz)   PHYSICAL EXAM:  Constitutional: Well appearing woman " in no acute distress.   Psychological: appropriate mood.  Eyes: anicteric, normal extra-ocular movements,  pupils are equal and reactive to light.   Ears, Nose and Throat: tympanic membranes clear, nose clear and free of lesions, throat clear, moist mucous membrames, neck supple with full range of motion.    Neck: No thyroidmegaly. No jugular venous distension, no carotid bruits.  Cardiovascular: regular rate and rhythm, normal S1 and S2, no murmurs, rubs or gallops, peripheral pulses full and symmetric   Respiratory: clear to auscultation, no wheezes or crackles, normal breath sounds.  Breast: Symmetrical without visible distortion or swelling. No masses noted. No nipple inversion, no breast dimpling or puckering. Axillary area without masses or lympadenapathy.   Gastrointestinal: positive bowel sounds, nontender, no hepatosplenomegaly, no masses. No guarding or rebound.  Genitourinary: N/A   Musculoskeletal: full range of motion    Skin: no concerning lesions, no jaundice.  Neurological: normal gait, no tremor.   Diagnoses and associated orders for this visit:  Annual physical exam     - mammogram in 2017 - shared decision making     - PAP/HPV negative 12/2016.   Iron deficiency  -     Ferritin; Future  -     CBC with Platelets; Future  Screening for diabetes mellitus  -     Basic Metabolic Panel; Future  Screening for cholesterol level  -     Lipid Panel; Future  Screening for thyroid disorder  -     TSH; Future  Screening for deficiency anemia  -     CBC with Platelets; Future  -     Iron and Iron Binding Capacity; Future  -     Ferritin; Future  Vitamin D deficiency  -     25- OH-Vitamin D; Future  Weight gain  -     ALT; Future  -     AST; Future     - Weight goal <170    SEND LETTER WITH RESULTS

## 2017-11-22 ENCOUNTER — OFFICE VISIT (OUTPATIENT)
Dept: FAMILY MEDICINE | Facility: CLINIC | Age: 42
End: 2017-11-22
Attending: FAMILY MEDICINE
Payer: COMMERCIAL

## 2017-11-22 VITALS
HEART RATE: 78 BPM | WEIGHT: 175.3 LBS | BODY MASS INDEX: 29.93 KG/M2 | DIASTOLIC BLOOD PRESSURE: 75 MMHG | HEIGHT: 64 IN | SYSTOLIC BLOOD PRESSURE: 111 MMHG

## 2017-11-22 DIAGNOSIS — Z13.1 SCREENING FOR DIABETES MELLITUS: Primary | ICD-10-CM

## 2017-11-22 DIAGNOSIS — Z13.220 SCREENING FOR CHOLESTEROL LEVEL: ICD-10-CM

## 2017-11-22 DIAGNOSIS — R63.5 WEIGHT GAIN: ICD-10-CM

## 2017-11-22 DIAGNOSIS — E55.9 VITAMIN D DEFICIENCY: ICD-10-CM

## 2017-11-22 DIAGNOSIS — Z13.29 SCREENING FOR THYROID DISORDER: ICD-10-CM

## 2017-11-22 DIAGNOSIS — Z13.0 SCREENING FOR DEFICIENCY ANEMIA: ICD-10-CM

## 2017-11-22 DIAGNOSIS — Z13.1 SCREENING FOR DIABETES MELLITUS: ICD-10-CM

## 2017-11-22 LAB
ALT SERPL W P-5'-P-CCNC: 68 U/L (ref 0–50)
ANION GAP SERPL CALCULATED.3IONS-SCNC: 6 MMOL/L (ref 3–14)
AST SERPL W P-5'-P-CCNC: 35 U/L (ref 0–45)
BUN SERPL-MCNC: 13 MG/DL (ref 7–30)
CALCIUM SERPL-MCNC: 9.1 MG/DL (ref 8.5–10.1)
CHLORIDE SERPL-SCNC: 108 MMOL/L (ref 94–109)
CHOLEST SERPL-MCNC: 183 MG/DL
CO2 SERPL-SCNC: 27 MMOL/L (ref 20–32)
CREAT SERPL-MCNC: 0.65 MG/DL (ref 0.52–1.04)
DEPRECATED CALCIDIOL+CALCIFEROL SERPL-MC: 31 UG/L (ref 20–75)
ERYTHROCYTE [DISTWIDTH] IN BLOOD BY AUTOMATED COUNT: 12.5 % (ref 10–15)
FERRITIN SERPL-MCNC: 18 NG/ML (ref 12–150)
GFR SERPL CREATININE-BSD FRML MDRD: >90 ML/MIN/1.7M2
GLUCOSE SERPL-MCNC: 89 MG/DL (ref 70–99)
HCT VFR BLD AUTO: 39.1 % (ref 35–47)
HDLC SERPL-MCNC: 54 MG/DL
HGB BLD-MCNC: 12.3 G/DL (ref 11.7–15.7)
IRON SATN MFR SERPL: 17 % (ref 15–46)
IRON SERPL-MCNC: 64 UG/DL (ref 35–180)
LDLC SERPL CALC-MCNC: 114 MG/DL
MCH RBC QN AUTO: 28.1 PG (ref 26.5–33)
MCHC RBC AUTO-ENTMCNC: 31.5 G/DL (ref 31.5–36.5)
MCV RBC AUTO: 89 FL (ref 78–100)
NONHDLC SERPL-MCNC: 129 MG/DL
PLATELET # BLD AUTO: 296 10E9/L (ref 150–450)
POTASSIUM SERPL-SCNC: 4.4 MMOL/L (ref 3.4–5.3)
RBC # BLD AUTO: 4.38 10E12/L (ref 3.8–5.2)
SODIUM SERPL-SCNC: 141 MMOL/L (ref 133–144)
TIBC SERPL-MCNC: 375 UG/DL (ref 240–430)
TRIGL SERPL-MCNC: 74 MG/DL
TSH SERPL DL<=0.005 MIU/L-ACNC: 0.9 MU/L (ref 0.4–4)
WBC # BLD AUTO: 6.3 10E9/L (ref 4–11)

## 2017-11-22 PROCEDURE — 99212 OFFICE O/P EST SF 10 MIN: CPT | Mod: ZF

## 2017-11-22 PROCEDURE — 85027 COMPLETE CBC AUTOMATED: CPT | Performed by: FAMILY MEDICINE

## 2017-11-22 PROCEDURE — 83550 IRON BINDING TEST: CPT | Performed by: FAMILY MEDICINE

## 2017-11-22 PROCEDURE — 84450 TRANSFERASE (AST) (SGOT): CPT | Performed by: FAMILY MEDICINE

## 2017-11-22 PROCEDURE — 84443 ASSAY THYROID STIM HORMONE: CPT | Performed by: FAMILY MEDICINE

## 2017-11-22 PROCEDURE — 83540 ASSAY OF IRON: CPT | Performed by: FAMILY MEDICINE

## 2017-11-22 PROCEDURE — 36415 COLL VENOUS BLD VENIPUNCTURE: CPT | Performed by: FAMILY MEDICINE

## 2017-11-22 PROCEDURE — 80061 LIPID PANEL: CPT | Performed by: FAMILY MEDICINE

## 2017-11-22 PROCEDURE — 82306 VITAMIN D 25 HYDROXY: CPT | Performed by: FAMILY MEDICINE

## 2017-11-22 PROCEDURE — 84460 ALANINE AMINO (ALT) (SGPT): CPT | Performed by: FAMILY MEDICINE

## 2017-11-22 PROCEDURE — 80048 BASIC METABOLIC PNL TOTAL CA: CPT | Performed by: FAMILY MEDICINE

## 2017-11-22 PROCEDURE — 82728 ASSAY OF FERRITIN: CPT | Performed by: FAMILY MEDICINE

## 2017-11-22 NOTE — LETTER
11/22/2017    RE: Mellisa Thompson  [address]  [place] [address]     Mellisa is a 89 year old female who presents to [facility] for annual exam    HCM: PAP/HPV 12/2016 negative; mammogram/US 9/2017  Concerns:   iron deficiency anemia: currently off iron supplementation  - feels better on a protein rich diet [meat]  - Good plant based diet in addition  Adjustment disorder with mixed anxiety and depressed mood  -  Off sertraline since March 2017  -  Seeing therapist  Neurologist: Dr. Mohan yearly  -  Seizure hx: on Keppra 1000 MG TABS BID  ROS:  General: low iron   Head/Eyes: none  Ears/Nose/Throat: none  Cardiovascular: none  Respiratory: none  Gastrointestinal: none  Breast: none  Musculoskeletal: none  Skin: none  Neurological: none  Mental Health: feeling ok off sertraline  Endocrine: none  PROBLEM LIST:  Patient Active Problem List   Diagnosis     Convulsions (H)     Sinus tachycardia     Adjustment disorder with mixed anxiety and depressed mood     Seasonal affective disorder (H)     Major depressive disorder, recurrent episode, mild with seasonal pattern (H)   OB/GYN HISTORY:   G0.   Menses: regular  Contraception: none  PAST MEDICAL HISTORY:  Past Medical History:   Diagnosis Date     Anxiety      Depression      Epilepsy (H)    Life Style Modifiers:   Tobacco:  reports that she has never smoked. She has never used smokeless tobacco.   Alcohol:  reports that she drinks alcohol.   Drug use:  reports that she does not use illicit drugs.  Exercise: yoga - core power daily.             Diet: good but eats some meals  PAST SURGICAL HISTORY:  Past Surgical History:   Procedure Laterality Date     ANKLE SURGERY Right 1999     BRAIN SURGERY  2007    right temporal lobectomy    FAMILY HISTORY:  Family History   Problem Relation Age of Onset     Hypertension Mother      CANCER Father      CANCER Paternal Grandfather      DIABETES Maternal Grandmother      Melanoma No family hx of      Skin Cancer No family hx of  "   SOCIAL HISTORY:  Has family in [place] and moved back in January 2016. Originally from [facility]. Works in the [employer] in [facility].   MEDICATIONS:  Current Outpatient Prescriptions   Medication Sig Dispense Refill     levETIRAcetam 1000 MG TABS Take 1,000 mg by mouth 2 times daily 180 tablet 3     Cholecalciferol (VITAMIN D) 2000 UNITS tablet Take 1 tablet by mouth daily Take one tablet daily. 90 tablet 3     albuterol (VENTOLIN HFA) 108 (90 BASE) MCG/ACT Inhaler Inhale 2 puffs into the lungs every 6 hours as needed for shortness of breath / dyspnea or wheezing (Patient not taking: Reported on 11/22/2017) 1 Inhaler 0     benzonatate (TESSALON) 100 MG capsule Take 1 capsule (100 mg) by mouth 3 times daily as needed for cough (Patient not taking: Reported on 11/22/2017) 42 capsule 0     Prenatal Vit-Fe Fumarate-FA (PREPLUS) 27-1 MG TABS TAKE 1 TABLET BY MOUTH ONCE DAILY (Patient not taking: Reported on 11/22/2017) 90 tablet 3     order for DME Equipment being ordered: Light box 10,000 Lux for seasonal affective disorder code F39 1 Box 0     Omega-3 Fatty Acids (OMEGA III EPA+DHA) 1000 MG CAPS Take one twice a day (Patient not taking: Reported on 11/22/2017) 90 capsule 1     ALPRAZolam (XANAX) 0.25 MG tablet Take 1 tablet (0.25 mg) by mouth 3 times daily as needed for anxiety       triamcinolone (KENALOG) 0.1 % ointment Apply to affected chest up to twice daily as needed. (Patient not taking: Reported on 11/22/2017) 80 g 1     minoxidil 5 % SOLN Apply topically once daily to entire scalp. . (Patient not taking: Reported on 11/22/2017) 60 mL 0   ALLERGIES:  Review of patient's allergies indicates no known allergies.  VITALS:  /75  Pulse 78  Ht 1.613 m (5' 3.5\")  Wt 79.5 kg (175 lb 4.8 oz)  LMP 11/14/2017  BMI 30.56 kg/m2  Wt Readings from Last 5 Encounters:   11/22/17 79.5 kg (175 lb 4.8 oz)   06/16/17 78 kg (172 lb)   04/14/17 76.2 kg (167 lb 14.4 oz)   01/25/17 78.7 kg (173 lb 6.4 oz)   12/20/16 77 " kg (169 lb 12.8 oz)   PHYSICAL EXAM:  Constitutional: Well appearing woman in no acute distress.   Psychological: appropriate mood.  Eyes: anicteric, normal extra-ocular movements,  pupils are equal and reactive to light.   Ears, Nose and Throat: tympanic membranes clear, nose clear and free of lesions, throat clear, moist mucous membrames, neck supple with full range of motion.    Neck: No thyroidmegaly. No jugular venous distension, no carotid bruits.  Cardiovascular: regular rate and rhythm, normal S1 and S2, no murmurs, rubs or gallops, peripheral pulses full and symmetric   Respiratory: clear to auscultation, no wheezes or crackles, normal breath sounds.  Breast: Symmetrical without visible distortion or swelling. No masses noted. No nipple inversion, no breast dimpling or puckering. Axillary area without masses or lympadenapathy.   Gastrointestinal: positive bowel sounds, nontender, no hepatosplenomegaly, no masses. No guarding or rebound.  Genitourinary: N/A   Musculoskeletal: full range of motion    Skin: no concerning lesions, no jaundice.  Neurological: normal gait, no tremor.   Diagnoses and associated orders for this visit:  Annual physical exam     - mammogram in 2017 - shared decision making     - PAP/HPV negative 12/2016.   Iron deficiency  -     Ferritin; Future  -     CBC with Platelets; Future  Screening for diabetes mellitus  -     Basic Metabolic Panel; Future  Screening for cholesterol level  -     Lipid Panel; Future  Screening for thyroid disorder  -     TSH; Future  Screening for deficiency anemia  -     CBC with Platelets; Future  -     Iron and Iron Binding Capacity; Future  -     Ferritin; Future  Vitamin D deficiency  -     25- OH-Vitamin D; Future  Weight gain  -     ALT; Future  -     AST; Future     - Weight goal <170    SEND LETTER WITH RESULTS    Ree Michele MD    11/26/2017      Mellisa Thompson   [address]  [place] [address]     Dear Ms. Thompson:     The  results of your recent  were .            Results for orders placed or performed in visit on 11/22/17   25- OH-Vitamin D   Result Value Ref Range     Vitamin D Deficiency screening 31 20 - 75 ug/L   Basic Metabolic Panel   Result Value Ref Range     Sodium 141 133 - 144 mmol/L     Potassium 4.4 3.4 - 5.3 mmol/L     Chloride 108 94 - 109 mmol/L     Carbon Dioxide 27 20 - 32 mmol/L     Anion Gap 6 3 - 14 mmol/L     Glucose 89 70 - 99 mg/dL     Urea Nitrogen 13 7 - 30 mg/dL     Creatinine 0.65 0.52 - 1.04 mg/dL     GFR Estimate >90 >60 mL/min/1.7m2     GFR Estimate If Black >90 >60 mL/min/1.7m2     Calcium 9.1 8.5 - 10.1 mg/dL   CBC with Platelets   Result Value Ref Range     WBC 6.3 4.0 - 11.0 10e9/L     RBC Count 4.38 3.8 - 5.2 10e12/L     Hemoglobin 12.3 11.7 - 15.7 g/dL     Hematocrit 39.1 35.0 - 47.0 %     MCV 89 78 - 100 fl     MCH 28.1 26.5 - 33.0 pg     MCHC 31.5 31.5 - 36.5 g/dL     RDW 12.5 10.0 - 15.0 %     Platelet Count 296 150 - 450 10e9/L   ALT   Result Value Ref Range     ALT 68 (H) 0 - 50 U/L   AST   Result Value Ref Range     AST 35 0 - 45 U/L   TSH   Result Value Ref Range     TSH 0.90 0.40 - 4.00 mU/L   Lipid Panel   Result Value Ref Range     Cholesterol 183 <200 mg/dL     Triglycerides 74 <150 mg/dL     HDL Cholesterol 54 >49 mg/dL     LDL Cholesterol Calculated 114 (H) <100 mg/dL     Non HDL Cholesterol 129 <130 mg/dL   Iron and Iron Binding Capacity   Result Value Ref Range     Iron 64 35 - 180 ug/dL     Iron Binding Cap 375 240 - 430 ug/dL     Iron Saturation Index 17 15 - 46 %   Ferritin   Result Value Ref Range     Ferritin 18 12 - 150 ng/mL       Please note that test explanations are brief and do not reflect all diagnostic uses.  If you have any questions or concerns, please call the clinic at [phone].       Sincerely,        Ree Michele MD

## 2017-11-22 NOTE — LETTER
11/22/2017     RE: Mellisa Thompson  [address]  [place] [address]     Dear Colleague,    Thank you for referring your patient, Mellisa Thompson, to the [facility]. Please see a copy of my visit note below.    Mellisa is a 89 year old female who presents to [facility] for annual exam    HCM: PAP/HPV 12/2016 negative; mammogram/US 9/2017  Concerns:   iron deficiency anemia: currently off iron supplementation  - feels better on a protein rich diet [meat]  - Good plant based diet in addition  Adjustment disorder with mixed anxiety and depressed mood  -  Off sertraline since March 2017  -  Seeing therapist  Neurologist: Dr. Mohan yearly  -  Seizure hx: on Keppra 1000 MG TABS BID  ROS:  General: low iron   Head/Eyes: none  Ears/Nose/Throat: none  Cardiovascular: none  Respiratory: none  Gastrointestinal: none  Breast: none  Musculoskeletal: none  Skin: none  Neurological: none  Mental Health: feeling ok off sertraline  Endocrine: none  PROBLEM LIST:  Patient Active Problem List   Diagnosis     Convulsions (H)     Sinus tachycardia     Adjustment disorder with mixed anxiety and depressed mood     Seasonal affective disorder (H)     Major depressive disorder, recurrent episode, mild with seasonal pattern (H)   OB/GYN HISTORY:   G0.   Menses: regular  Contraception: none  PAST MEDICAL HISTORY:  Past Medical History:   Diagnosis Date     Anxiety      Depression      Epilepsy (H)    Life Style Modifiers:   Tobacco:  reports that she has never smoked. She has never used smokeless tobacco.   Alcohol:  reports that she drinks alcohol.   Drug use:  reports that she does not use illicit drugs.  Exercise: yoga - core power daily.             Diet: good but eats some meals  PAST SURGICAL HISTORY:  Past Surgical History:   Procedure Laterality Date     ANKLE SURGERY Right 1999     BRAIN SURGERY  2007    right temporal lobectomy    FAMILY HISTORY:  Family  "History   Problem Relation Age of Onset     Hypertension Mother      CANCER Father      CANCER Paternal Grandfather      DIABETES Maternal Grandmother      Melanoma No family hx of      Skin Cancer No family hx of    SOCIAL HISTORY:  Has family in [place] and moved back in January 2016. Originally from [facility] [facility]. Works in the [employer] in [employer].   MEDICATIONS:  Current Outpatient Prescriptions   Medication Sig Dispense Refill     levETIRAcetam 1000 MG TABS Take 1,000 mg by mouth 2 times daily 180 tablet 3     Cholecalciferol (VITAMIN D) 2000 UNITS tablet Take 1 tablet by mouth daily Take one tablet daily. 90 tablet 3     albuterol (VENTOLIN HFA) 108 (90 BASE) MCG/ACT Inhaler Inhale 2 puffs into the lungs every 6 hours as needed for shortness of breath / dyspnea or wheezing (Patient not taking: Reported on 11/22/2017) 1 Inhaler 0     benzonatate (TESSALON) 100 MG capsule Take 1 capsule (100 mg) by mouth 3 times daily as needed for cough (Patient not taking: Reported on 11/22/2017) 42 capsule 0     Prenatal Vit-Fe Fumarate-FA (PREPLUS) 27-1 MG TABS TAKE 1 TABLET BY MOUTH ONCE DAILY (Patient not taking: Reported on 11/22/2017) 90 tablet 3     order for DME Equipment being ordered: Light box 10,000 Lux for seasonal affective disorder code F39 1 Box 0     Omega-3 Fatty Acids (OMEGA III EPA+DHA) 1000 MG CAPS Take one twice a day (Patient not taking: Reported on 11/22/2017) 90 capsule 1     ALPRAZolam (XANAX) 0.25 MG tablet Take 1 tablet (0.25 mg) by mouth 3 times daily as needed for anxiety       triamcinolone (KENALOG) 0.1 % ointment Apply to affected chest up to twice daily as needed. (Patient not taking: Reported on 11/22/2017) 80 g 1     minoxidil 5 % SOLN Apply topically once daily to entire scalp. . (Patient not taking: Reported on 11/22/2017) 60 mL 0   ALLERGIES:  Review of patient's allergies indicates no known allergies.  VITALS:  /75  Pulse 78  Ht 1.613 m (5' 3.5\")  Wt 79.5 kg (175 lb 4.8 oz)  " LMP 11/14/2017  BMI 30.56 kg/m2  Wt Readings from Last 5 Encounters:   11/22/17 79.5 kg (175 lb 4.8 oz)   06/16/17 78 kg (172 lb)   04/14/17 76.2 kg (167 lb 14.4 oz)   01/25/17 78.7 kg (173 lb 6.4 oz)   12/20/16 77 kg (169 lb 12.8 oz)   PHYSICAL EXAM:  Constitutional: Well appearing woman in no acute distress.   Psychological: appropriate mood.  Eyes: anicteric, normal extra-ocular movements,  pupils are equal and reactive to light.   Ears, Nose and Throat: tympanic membranes clear, nose clear and free of lesions, throat clear, moist mucous membrames, neck supple with full range of motion.    Neck: No thyroidmegaly. No jugular venous distension, no carotid bruits.  Cardiovascular: regular rate and rhythm, normal S1 and S2, no murmurs, rubs or gallops, peripheral pulses full and symmetric   Respiratory: clear to auscultation, no wheezes or crackles, normal breath sounds.  Breast: Symmetrical without visible distortion or swelling. No masses noted. No nipple inversion, no breast dimpling or puckering. Axillary area without masses or lympadenapathy.   Gastrointestinal: positive bowel sounds, nontender, no hepatosplenomegaly, no masses. No guarding or rebound.  Genitourinary: N/A   Musculoskeletal: full range of motion    Skin: no concerning lesions, no jaundice.  Neurological: normal gait, no tremor.   Diagnoses and associated orders for this visit:  Annual physical exam     - mammogram in 2017 - shared decision making     - PAP/HPV negative 12/2016.   Iron deficiency  -     Ferritin; Future  -     CBC with Platelets; Future  Screening for diabetes mellitus  -     Basic Metabolic Panel; Future  Screening for cholesterol level  -     Lipid Panel; Future  Screening for thyroid disorder  -     TSH; Future  Screening for deficiency anemia  -     CBC with Platelets; Future  -     Iron and Iron Binding Capacity; Future  -     Ferritin; Future  Vitamin D deficiency  -     25- OH-Vitamin D; Future  Weight gain  -     ALT;  Future  -     AST; Future     - Weight goal <170    SEND LETTER WITH RESULTS    Again, thank you for allowing me to participate in the care of your patient.      Sincerely,    Ree Michele MD

## 2017-11-22 NOTE — MR AVS SNAPSHOT
After Visit Summary   11/22/2017    Mellisa Thompson    MRN: [id number]           Patient Information     Date Of Birth          1975        Visit Information        Provider Department      11/22/2017 8:40 AM Ree Michele MD [facility]        Today's Diagnoses     Screening for diabetes mellitus    -  1    Screening for cholesterol level        Screening for thyroid disorder        Screening for deficiency anemia        Vitamin D deficiency        Weight gain           Follow-ups after your visit        Your next 10 appointments already scheduled     Nov 22, 2017 11:00 AM CST   (Arrive by 10:45 AM)   Return Visit with Joyce Huerta, PhD [facility] ([facility])    [address] [place] [address] [id number] [phone]            Jun 19, 2018 10:00 AM CDT   (Arrive by 9:45 AM)   Return Seizure with Brody Mohan MD   [facility] ([facility])    [address] [place] [address] [id number] [phone]              Future tests that were ordered for you today     Open Future Orders        Priority Expected Expires Ordered    Iron and Iron Binding Capacity Routine  11/22/2018 11/22/2017    Ferritin Routine  11/22/2018 11/22/2017    25- [place]-Vitamin D Routine  11/22/2018 11/22/2017    Basic Metabolic Panel Routine  11/22/2018 11/22/2017    CBC with Platelets Routine  11/22/2018 11/22/2017    ALT Routine  11/22/2018 11/22/2017    AST Routine  11/22/2018 11/22/2017    TSH Routine  11/22/2018 11/22/2017    Lipid Panel Routine  11/22/2018 11/22/2017            Who to contact     Please call your clinic at [phone] to:    Ask questions about your health    Make or cancel appointments    Discuss your medicines    Learn about your test results    Speak to your doctor   If you have compliments or concerns about an experience at your clinic, or if you wish to file a  "complaint, please contact [facility] Patient Relations at [phone] or email us at [email or web address]         Additional Information About Your Visit        [employer] Information     [employer] is an electronic gateway that provides easy, online access to your medical records. With [employer], you can request a clinic appointment, read your test results, renew a prescription or communicate with your care team.     To sign up for [employer] visit the website at [email or web address]   You will be asked to enter the access code listed below, as well as some personal information. Please follow the directions to create your username and password.     Your access code is: [email or web address]  Expires: 2018  9:18 AM     Your access code will  in 90 days. If you need help or a new code, please contact your [facility] Clinic or call [phone] for assistance.        Care EveryWhere ID     This is your Care EveryWhere ID. This could be used by other organizations to access your [facility] medical records  [id number]        Your Vitals Were     Pulse Height Last Period BMI (Body Mass Index)          78 1.613 m (5' 3.5\") 2017 30.56 kg/m2         Blood Pressure from Last 3 Encounters:   17 111/75   17 121/63   17 119/76    Weight from Last 3 Encounters:   17 79.5 kg (175 lb 4.8 oz)   17 78 kg (172 lb)   17 76.2 kg (167 lb 14.4 oz)               Primary Care Provider Office Phone # Fax #    Ree Michele [phone]       [address] [address]        Equal Access to Services     LUIS M ROSAS : Eleuterio Garnett, ned leonadaha, kaelyn kaalmada cindy, rosio dominguez. So [facility] [phone].    ATENCIÓN: Si habla español, tiene a farah disposición servicios gratuitos de asistencia lingüística. Dennis al [phone].    We comply with " applicable federal civil rights laws and [place] laws. We do not discriminate on the basis of race, color, national origin, age, disability, sex, sexual orientation, or gender identity.            Thank you!     Thank you for choosing [facility]  for your care. Our goal is always to provide you with excellent care. Hearing back from our patients is one way we can continue to improve our services. Please take a few minutes to complete the written survey that you may receive in the mail after your visit with us. Thank you!             Your Updated Medication List - Protect others around you: Learn how to safely use, store and throw away your medicines at www.disposemymeds.org.          This list is accurate as of: 11/22/17  9:18 AM.  Always use your most recent med list.                   Brand Name Dispense Instructions for use Diagnosis    albuterol 108 (90 BASE) MCG/ACT Inhaler    VENTOLIN HFA    1 Inhaler    Inhale 2 puffs into the lungs every 6 hours as needed for shortness of breath / dyspnea or wheezing    Acute bronchospasm       ALPRAZolam 0.25 MG tablet    XANAX     Take 1 tablet (0.25 mg) by mouth 3 times daily as needed for anxiety        benzonatate 100 MG capsule    TESSALON    42 capsule    Take 1 capsule (100 mg) by mouth 3 times daily as needed for cough    Cough       levETIRAcetam 1000 MG Tabs     180 tablet    Take 1,000 mg by mouth 2 times daily    Convulsions, unspecified convulsion type (H)       minoxidil 5 % Soln     60 mL    Apply topically once daily to entire scalp. .    Loss of hair       OMEGA III EPA+DHA 1000 MG Caps     90 capsule    Take one twice a day    Seizure disorder (H)       order for DME     1 Box    Equipment being ordered: Light box 10,000 Lux for seasonal affective disorder code F39    Seasonal affective disorder (H)       PREPLUS 27-1 MG Tabs     90 tablet    TAKE 1 TABLET BY MOUTH ONCE DAILY    Other iron deficiency anemia       triamcinolone 0.1  % ointment    KENALOG    80 g    Apply to affected chest up to twice daily as needed.    Rash       vitamin D 2000 UNITS tablet     90 tablet    Take 1 tablet by mouth daily Take one tablet daily.    Vitamin D deficiency

## 2018-01-18 ENCOUNTER — TELEPHONE (OUTPATIENT)
Dept: OBGYN | Facility: CLINIC | Age: 43
End: 2018-01-18

## 2018-01-18 DIAGNOSIS — R10.84 ABDOMINAL PAIN, GENERALIZED: Primary | ICD-10-CM

## 2018-01-18 NOTE — TELEPHONE ENCOUNTER
Patient requesting a GI referral for abdominal pain. She states that she has always had some discomfort but [place] and the pain was much worse. She denies diarrhea or constipation. Does have some heartburn. Patient is doing fine now but wants to talk about these flare-ups. Nurse input referral for Dr. Michele to sign.

## 2018-01-25 ENCOUNTER — TRANSFERRED RECORDS (OUTPATIENT)
Dept: HEALTH INFORMATION MANAGEMENT | Facility: CLINIC | Age: 43
End: 2018-01-25

## 2018-01-26 ENCOUNTER — HOSPITAL ENCOUNTER (INPATIENT)
Facility: CLINIC | Age: 43
LOS: 2 days | Discharge: HOME | End: 2018-01-28
Attending: EMERGENCY MEDICINE | Admitting: SURGERY
Payer: COMMERCIAL

## 2018-01-26 ENCOUNTER — TRANSFERRED RECORDS (OUTPATIENT)
Dept: HEALTH INFORMATION MANAGEMENT | Facility: CLINIC | Age: 43
End: 2018-01-26

## 2018-01-26 DIAGNOSIS — K63.1 PERFORATED BOWEL (H): ICD-10-CM

## 2018-01-26 DIAGNOSIS — Z23 NEED FOR PROPHYLACTIC VACCINATION AND INOCULATION AGAINST INFLUENZA: Primary | ICD-10-CM

## 2018-01-26 DIAGNOSIS — K50.018 CROHN'S DISEASE OF SMALL INTESTINE WITH OTHER COMPLICATION (H): ICD-10-CM

## 2018-01-26 LAB
ALBUMIN UR-MCNC: 10 MG/DL
ANION GAP SERPL CALCULATED.3IONS-SCNC: 9 MMOL/L (ref 3–14)
APPEARANCE UR: CLEAR
BACTERIA #/AREA URNS HPF: ABNORMAL /HPF
BASOPHILS # BLD AUTO: 0 10E9/L (ref 0–0.2)
BASOPHILS NFR BLD AUTO: 0.5 %
BILIRUB UR QL STRIP: NEGATIVE
BUN SERPL-MCNC: 11 MG/DL (ref 7–30)
CALCIUM SERPL-MCNC: 9.7 MG/DL (ref 8.5–10.1)
CHLORIDE SERPL-SCNC: 107 MMOL/L (ref 94–109)
CO2 SERPL-SCNC: 25 MMOL/L (ref 20–32)
COLOR UR AUTO: YELLOW
CREAT SERPL-MCNC: 0.67 MG/DL (ref 0.52–1.04)
CRP SERPL-MCNC: 10 MG/L (ref 0–8)
DIFFERENTIAL METHOD BLD: NORMAL
EOSINOPHIL # BLD AUTO: 0.1 10E9/L (ref 0–0.7)
EOSINOPHIL NFR BLD AUTO: 1.6 %
ERYTHROCYTE [DISTWIDTH] IN BLOOD BY AUTOMATED COUNT: 12.8 % (ref 10–15)
ERYTHROCYTE [SEDIMENTATION RATE] IN BLOOD BY WESTERGREN METHOD: 17 MM/H (ref 0–20)
GFR SERPL CREATININE-BSD FRML MDRD: >90 ML/MIN/1.7M2
GLUCOSE SERPL-MCNC: 80 MG/DL (ref 70–99)
GLUCOSE UR STRIP-MCNC: NEGATIVE MG/DL
HCG UR QL: NEGATIVE
HCT VFR BLD AUTO: 40 % (ref 35–47)
HGB BLD-MCNC: 12.7 G/DL (ref 11.7–15.7)
HGB UR QL STRIP: ABNORMAL
IMM GRANULOCYTES # BLD: 0 10E9/L (ref 0–0.4)
IMM GRANULOCYTES NFR BLD: 0.1 %
KETONES UR STRIP-MCNC: NEGATIVE MG/DL
LEUKOCYTE ESTERASE UR QL STRIP: NEGATIVE
LYMPHOCYTES # BLD AUTO: 3.5 10E9/L (ref 0.8–5.3)
LYMPHOCYTES NFR BLD AUTO: 39.9 %
MCH RBC QN AUTO: 27.8 PG (ref 26.5–33)
MCHC RBC AUTO-ENTMCNC: 31.8 G/DL (ref 31.5–36.5)
MCV RBC AUTO: 88 FL (ref 78–100)
MONOCYTES # BLD AUTO: 0.4 10E9/L (ref 0–1.3)
MONOCYTES NFR BLD AUTO: 5 %
MUCOUS THREADS #/AREA URNS LPF: PRESENT /LPF
NEUTROPHILS # BLD AUTO: 4.7 10E9/L (ref 1.6–8.3)
NEUTROPHILS NFR BLD AUTO: 52.9 %
NITRATE UR QL: NEGATIVE
NRBC # BLD AUTO: 0 10*3/UL
NRBC BLD AUTO-RTO: 0 /100
PH UR STRIP: 6 PH (ref 5–7)
PLATELET # BLD AUTO: 319 10E9/L (ref 150–450)
POTASSIUM SERPL-SCNC: 3.4 MMOL/L (ref 3.4–5.3)
RBC # BLD AUTO: 4.57 10E12/L (ref 3.8–5.2)
RBC #/AREA URNS AUTO: 5 /HPF (ref 0–2)
SODIUM SERPL-SCNC: 140 MMOL/L (ref 133–144)
SOURCE: ABNORMAL
SP GR UR STRIP: 1.04 (ref 1–1.03)
SQUAMOUS #/AREA URNS AUTO: 3 /HPF (ref 0–1)
UROBILINOGEN UR STRIP-MCNC: NORMAL MG/DL (ref 0–2)
WBC # BLD AUTO: 8.8 10E9/L (ref 4–11)
WBC #/AREA URNS AUTO: 1 /HPF (ref 0–2)

## 2018-01-26 PROCEDURE — 99285 EMERGENCY DEPT VISIT HI MDM: CPT | Mod: Z6 | Performed by: EMERGENCY MEDICINE

## 2018-01-26 PROCEDURE — 80048 BASIC METABOLIC PNL TOTAL CA: CPT | Performed by: EMERGENCY MEDICINE

## 2018-01-26 PROCEDURE — 25000128 H RX IP 250 OP 636: Performed by: EMERGENCY MEDICINE

## 2018-01-26 PROCEDURE — 96361 HYDRATE IV INFUSION ADD-ON: CPT | Performed by: EMERGENCY MEDICINE

## 2018-01-26 PROCEDURE — 86140 C-REACTIVE PROTEIN: CPT | Performed by: EMERGENCY MEDICINE

## 2018-01-26 PROCEDURE — 85652 RBC SED RATE AUTOMATED: CPT | Performed by: EMERGENCY MEDICINE

## 2018-01-26 PROCEDURE — 99285 EMERGENCY DEPT VISIT HI MDM: CPT | Mod: 25 | Performed by: EMERGENCY MEDICINE

## 2018-01-26 PROCEDURE — 81025 URINE PREGNANCY TEST: CPT | Performed by: EMERGENCY MEDICINE

## 2018-01-26 PROCEDURE — 85025 COMPLETE CBC W/AUTO DIFF WBC: CPT | Performed by: EMERGENCY MEDICINE

## 2018-01-26 PROCEDURE — 96374 THER/PROPH/DIAG INJ IV PUSH: CPT | Performed by: EMERGENCY MEDICINE

## 2018-01-26 PROCEDURE — 81001 URINALYSIS AUTO W/SCOPE: CPT | Performed by: EMERGENCY MEDICINE

## 2018-01-26 PROCEDURE — 27210995 ZZH RX 272: Performed by: EMERGENCY MEDICINE

## 2018-01-26 PROCEDURE — 12000001 ZZH R&B MED SURG/OB UMMC

## 2018-01-26 RX ORDER — PROCHLORPERAZINE 25 MG
25 SUPPOSITORY, RECTAL RECTAL EVERY 12 HOURS PRN
Status: DISCONTINUED | OUTPATIENT
Start: 2018-01-26 | End: 2018-01-28 | Stop reason: HOSPADM

## 2018-01-26 RX ORDER — ERTAPENEM 1 G/1
1 INJECTION, POWDER, LYOPHILIZED, FOR SOLUTION INTRAMUSCULAR; INTRAVENOUS ONCE
Status: DISCONTINUED | OUTPATIENT
Start: 2018-01-26 | End: 2018-01-26

## 2018-01-26 RX ORDER — HYDROMORPHONE HYDROCHLORIDE 1 MG/ML
0.5 INJECTION, SOLUTION INTRAMUSCULAR; INTRAVENOUS; SUBCUTANEOUS
Status: DISCONTINUED | OUTPATIENT
Start: 2018-01-26 | End: 2018-01-28

## 2018-01-26 RX ORDER — ONDANSETRON 2 MG/ML
4 INJECTION INTRAMUSCULAR; INTRAVENOUS EVERY 6 HOURS PRN
Status: DISCONTINUED | OUTPATIENT
Start: 2018-01-26 | End: 2018-01-28 | Stop reason: HOSPADM

## 2018-01-26 RX ORDER — ONDANSETRON 2 MG/ML
4 INJECTION INTRAMUSCULAR; INTRAVENOUS EVERY 30 MIN PRN
Status: DISCONTINUED | OUTPATIENT
Start: 2018-01-26 | End: 2018-01-28 | Stop reason: HOSPADM

## 2018-01-26 RX ORDER — PROCHLORPERAZINE MALEATE 5 MG
10 TABLET ORAL EVERY 6 HOURS PRN
Status: DISCONTINUED | OUTPATIENT
Start: 2018-01-26 | End: 2018-01-28 | Stop reason: HOSPADM

## 2018-01-26 RX ORDER — NALOXONE HYDROCHLORIDE 0.4 MG/ML
.1-.4 INJECTION, SOLUTION INTRAMUSCULAR; INTRAVENOUS; SUBCUTANEOUS
Status: DISCONTINUED | OUTPATIENT
Start: 2018-01-26 | End: 2018-01-28 | Stop reason: HOSPADM

## 2018-01-26 RX ORDER — ACETAMINOPHEN 325 MG/1
650 TABLET ORAL EVERY 4 HOURS PRN
Status: DISCONTINUED | OUTPATIENT
Start: 2018-01-26 | End: 2018-01-28 | Stop reason: HOSPADM

## 2018-01-26 RX ORDER — ONDANSETRON 4 MG/1
4 TABLET, ORALLY DISINTEGRATING ORAL EVERY 6 HOURS PRN
Status: DISCONTINUED | OUTPATIENT
Start: 2018-01-26 | End: 2018-01-28 | Stop reason: HOSPADM

## 2018-01-26 RX ORDER — SODIUM CHLORIDE, SODIUM LACTATE, POTASSIUM CHLORIDE, CALCIUM CHLORIDE 600; 310; 30; 20 MG/100ML; MG/100ML; MG/100ML; MG/100ML
1000 INJECTION, SOLUTION INTRAVENOUS CONTINUOUS
Status: DISCONTINUED | OUTPATIENT
Start: 2018-01-26 | End: 2018-01-27

## 2018-01-26 RX ORDER — HYDROMORPHONE HCL/0.9% NACL/PF 0.2MG/0.2
0.2 SYRINGE (ML) INTRAVENOUS
Status: DISCONTINUED | OUTPATIENT
Start: 2018-01-26 | End: 2018-01-28

## 2018-01-26 RX ADMIN — SODIUM CHLORIDE 1000 ML: 900 INJECTION, SOLUTION INTRAVENOUS at 21:28

## 2018-01-26 RX ADMIN — WATER 1 G: 100 INJECTION, SOLUTION INTRAVENOUS at 22:03

## 2018-01-26 ASSESSMENT — ENCOUNTER SYMPTOMS
VOMITING: 0
DIARRHEA: 0
APPETITE CHANGE: 1
HEMATURIA: 0
FREQUENCY: 0
NAUSEA: 1
ABDOMINAL PAIN: 1
BLOOD IN STOOL: 0
DYSURIA: 0

## 2018-01-26 NOTE — IP AVS SNAPSHOT
Unit [address]D Observation [address] [address]    Phone:  [phone]    Fax:  [phone]                                       After Visit Summary   1/26/2018    Mellisa Thompson    MRN: [id number]           After Visit Summary Signature Page     I have received my discharge instructions, and my questions have been answered. I have discussed any challenges I see with this plan with the nurse or doctor.    ..........................................................................................................................................  Patient/Patient Representative Signature      ..........................................................................................................................................  Patient Representative Print Name and Relationship to Patient    ..................................................               ................................................  Date                                            Time    ..........................................................................................................................................  Reviewed by Signature/Title    ...................................................              ..............................................  Date                                                            Time

## 2018-01-26 NOTE — ED NOTES
"Triage Assessment & Note:    /56  Pulse 69  Temp 99  F (37.2  C) (Oral)  Resp 18  Ht 1.626 m (5' 4\")  Wt 76.2 kg (168 lb)  SpO2 98%  BMI 28.84 kg/m2    Patient presents with:  42-yr female patient - presenting c/o:  Abdominal / pelvic pain; ongoing for the last month (since 1/2/18).    Home Treatments/Remedies:  Scheduled med's    Febrile / Afebrile?  Afebrile    Duration of C/o:  1 month    Mateusz Ludwig  January 26, 2018    "

## 2018-01-26 NOTE — IP AVS SNAPSHOT
MRN:[id number]                      After Visit Summary   1/26/2018    Mellisa Thompson    MRN: [id number]           Thank you!     Thank you for choosing [facility] for your care. Our goal is always to provide you with excellent care. Hearing back from our patients is one way we can continue to improve our services. Please take a few minutes to complete the written survey that you may receive in the mail after you visit with us. Thank you!        Patient Information     Date Of Birth          1975        About your hospital stay     You were admitted on:  January 26, 2018 You last received care in the:  Unit 6D Observation [facility]    You were discharged on:  January 28, 2018        Reason for your hospital stay       Perforated Diverticulitis                  Who to Call     For medical emergencies, please call [phone].  For non-urgent questions about your medical care, please call your primary care provider or clinic, [phone]          Attending Provider     Provider Specialty    Sami Caro MD Emergency Medicine    Franck, Justice Swift MD General Surgery       Primary Care Provider Office Phone # Fax #    Ree Michele [phone]      Follow-up Appointments     Adult [facility] Follow-up and recommended labs and tests       Follow up with Dr. Juárez , at [facility] , within 1 week  for hospital follow- up. No follow up labs or test are needed.    Appointments on [facility]/or [facility] (with [facility] or [facility] provider or service). Call [phone] if you haven't heard regarding these appointments within 7 days of discharge.                  Your next 10 appointments already scheduled     Apr 27, 2018  7:00 AM CDT   (Arrive by 6:45 AM)   New Patient Visit with Frank Fitzgerald PA-C   [facility] ([facility] [place])    [address]  [address] [facility] [address] [phone]            Jun 19,  " 10:00 AM CDT   (Arrive by 9:45 AM)   Return Seizure with Brody Mohan MD   [facility] ([facility])    [address]  [address] [facility] [address] [phone]              Pending Results     No orders found from 2018 to 2018.            Statement of Approval     Ordered          18 1057  I have reviewed and agree with all the recommendations and orders detailed in this document.  EFFECTIVE NOW     Approved and electronically signed by:  Solomon Muhammad MD             Admission Information     Date & Time Provider Department Dept. Phone    2018 Justice Juárez MD Unit 6D Observation [facility] [phone]      Your Vitals Were     Blood Pressure Pulse Temperature Respirations Height Weight    94/56 (BP Location: Right arm) 58 98.1  F (36.7  C) (Oral) 16 1.626 m (5' 4\") 78 kg (171 lb 15.3 oz)    Pulse Oximetry BMI (Body Mass Index)                98% 29.52 kg/m2          [employer] Information     [employer] lets you send messages to your doctor, view your test results, renew your prescriptions, schedule appointments and more. To sign up, go to [email or web address] . Click on \"Log in\" on the left side of the screen, which will take you to the Welcome page. Then click on \"Sign up Now\" on the right side of the page.     You will be asked to enter the access code listed below, as well as some personal information. Please follow the directions to create your username and password.     Your access code is: [email or web address]  Expires: 2018  9:18 AM     Your access code will  in 90 days. If you need help or a new code, please call your [facility] clinic or [phone].        Care EveryWhere ID     This is your Care EveryWhere ID. This could be used by other organizations to access your [facility] medical records  [id number]        Equal Access to Services     LUIS M ROSAS AH: Eleuterio Garnett, ned kaplan, kaelyn wilson " rosio whitemontez jones'aan ah. So [facility] [phone].    ATENCIÓN: Si habla rae, tiene a farah disposición servicios gratuitos de asistencia lingüística. Llevelyn al [phone].    We comply with applicable federal civil rights laws and [place] laws. We do not discriminate on the basis of race, color, national origin, age, disability, sex, sexual orientation, or gender identity.               Review of your medicines      START taking        Dose / Directions    ciprofloxacin 500 MG tablet   Commonly known as:  CIPRO   Indication:  Infection Within the Abdomen        Dose:  500 mg   Take 1 tablet (500 mg) by mouth every 12 hours for 12 days   Quantity:  24 tablet   Refills:  0       metroNIDAZOLE 500 MG tablet   Commonly known as:  FLAGYL   Indication:  Infection Within the Abdomen        Dose:  500 mg   Take 1 tablet (500 mg) by mouth every 8 hours for 12 days   Quantity:  37 tablet   Refills:  0       oseltamivir 75 MG capsule   Commonly known as:  TAMIFLU   Indication:  Flu, Treatment to Prevent Influenza   Used for:  Need for prophylactic vaccination and inoculation against influenza        Dose:  75 mg   Take 1 capsule (75 mg) by mouth daily for 5 days   Quantity:  5 capsule   Refills:  0         CONTINUE these medicines which have NOT CHANGED        Dose / Directions    albuterol 108 (90 BASE) MCG/ACT Inhaler   Commonly known as:  VENTOLIN HFA   Used for:  Acute bronchospasm        Dose:  2 puff   Inhale 2 puffs into the lungs every 6 hours as needed for shortness of breath / dyspnea or wheezing   Quantity:  1 Inhaler   Refills:  0       ALPRAZolam 0.25 MG tablet   Commonly known as:  XANAX        Dose:  0.25 mg   Take 1 tablet (0.25 mg) by mouth 3 times daily as needed for anxiety   Refills:  0       benzonatate 100 MG capsule   Commonly known as:  TESSALON   Used for:  Cough        Dose:  100 mg   Take 1 capsule (100 mg) by mouth 3 times daily as needed for cough   Quantity:  42  capsule   Refills:  0       levETIRAcetam 1000 MG Tabs   Used for:  Convulsions, unspecified convulsion type (H)        Dose:  1000 mg   Take 1,000 mg by mouth 2 times daily   Quantity:  180 tablet   Refills:  3       minoxidil 5 % Soln   Used for:  Loss of hair        Apply topically once daily to entire scalp. .   Quantity:  60 mL   Refills:  0       OMEGA III EPA+DHA 1000 MG Caps   Used for:  Seizure disorder (H)        Take one twice a day   Quantity:  90 capsule   Refills:  1       order for DME   Used for:  Seasonal affective disorder (H)        Equipment being ordered: Light box 10,000 Lux for seasonal affective disorder code F39   Quantity:  1 Box   Refills:  0       PREPLUS 27-1 MG Tabs   Used for:  Other iron deficiency anemia        TAKE 1 TABLET BY MOUTH ONCE DAILY   Quantity:  90 tablet   Refills:  3       triamcinolone 0.1 % ointment   Commonly known as:  KENALOG   Used for:  Rash        Apply to affected chest up to twice daily as needed.   Quantity:  80 g   Refills:  1       vitamin D 2000 UNITS tablet   Used for:  Vitamin D deficiency        Dose:  1 tablet   Take 1 tablet by mouth daily Take one tablet daily.   Quantity:  90 tablet   Refills:  3            Where to get your medicines      These medications were sent to [facility] [place], [place] - [address] [facility] [address]  [address] [facility] [address]     Phone:  [phone]     ciprofloxacin 500 MG tablet    metroNIDAZOLE 500 MG tablet    oseltamivir 75 MG capsule                Protect others around you: Learn how to safely use, store and throw away your medicines at www.disposemymeds.org.        ANTIBIOTIC INSTRUCTION     You've Been Prescribed an Antibiotic - Now What?  Your healthcare team thinks that you or your loved one might have an infection. Some infections can be treated with antibiotics, which are powerful, life-saving drugs. Like all medications, antibiotics have side effects and should only be used when  necessary. There are some important things you should know about your antibiotic treatment.      Your healthcare team may run tests before you start taking an antibiotic.    Your team may take samples (e.g., from your blood, urine or other areas) to run tests to look for bacteria. These test can be important to determine if you need an antibiotic at all and, if you do, which antibiotic will work best.      Within a few days, your healthcare team might change or even stop your antibiotic.    Your team may start you on an antibiotic while they are working to find out what is making you sick.    Your team might change your antibiotic because test results show that a different antibiotic would be better to treat your infection.    In some cases, once your team has more information, they learn that you do not need an antibiotic at all. They may find out that you don't have an infection, or that the antibiotic you're taking won't work against your infection. For example, an infection caused by a virus can't be treated with antibiotics. Staying on an antibiotic when you don't need it is more likely to be harmful than helpful.      You may experience side effects from your antibiotic.    Like all medications, antibiotics have side effects. Some of these can be serious.    Let you healthcare team know if you have any known allergies when you are admitted to the hospital.    One significant side effect of nearly all antibiotics is the risk of severe and sometimes deadly diarrhea caused by Clostridium difficile (C. Difficile). This occurs when a person takes antibiotics because some good germs are destroyed. Antibiotic use allows C. diificile to take over, putting patients at high risk for this serious infection.    As a patient or caregiver, it is important to understand your or your loved one's antibiotic treatment. It is especially important for caregivers to speak up when patients can't speak for themselves. Here are some  important questions to ask your healthcare team.    What infection is this antibiotic treating and how do you know I have that infection?    What side effects might occur from this antibiotic?    How long will I need to take this antibiotic?    Is it safe to take this antibiotic with other medications or supplements (e.g., vitamins) that I am taking?     Are there any special directions I need to know about taking this antibiotic? For example, should I take it with food?    How will I be monitored to know whether my infection is responding to the antibiotic?    What tests may help to make sure the right antibiotic is prescribed for me?      Information provided by:  www.cdc.gov/getsmart  U.S. Department of Health and Human Services  Centers for disease Control and Prevention  National Center for Emerging and Zoonotic Infectious Diseases  Division of Healthcare Quality Promotion             Medication List: This is a list of all your medications and when to take them. Check marks below indicate your daily home schedule. Keep this list as a reference.      Medications           Morning Afternoon Evening Bedtime As Needed    albuterol 108 (90 BASE) MCG/ACT Inhaler   Commonly known as:  VENTOLIN HFA   Inhale 2 puffs into the lungs every 6 hours as needed for shortness of breath / dyspnea or wheezing                                ALPRAZolam 0.25 MG tablet   Commonly known as:  XANAX   Take 1 tablet (0.25 mg) by mouth 3 times daily as needed for anxiety                                benzonatate 100 MG capsule   Commonly known as:  TESSALON   Take 1 capsule (100 mg) by mouth 3 times daily as needed for cough                                ciprofloxacin 500 MG tablet   Commonly known as:  CIPRO   Take 1 tablet (500 mg) by mouth every 12 hours for 12 days   Last time this was given:  500 mg on 1/28/2018  8:14 AM                                levETIRAcetam 1000 MG Tabs   Take 1,000 mg by mouth 2 times daily   Last time  this was given:  1,000 mg on 1/28/2018  8:14 AM                                metroNIDAZOLE 500 MG tablet   Commonly known as:  FLAGYL   Take 1 tablet (500 mg) by mouth every 8 hours for 12 days   Last time this was given:  500 mg on 1/28/2018  6:19 AM                                minoxidil 5 % Soln   Apply topically once daily to entire scalp. .                                OMEGA III EPA+DHA 1000 MG Caps   Take one twice a day                                order for DME   Equipment being ordered: Light box 10,000 Lux for seasonal affective disorder code F39                                oseltamivir 75 MG capsule   Commonly known as:  TAMIFLU   Take 1 capsule (75 mg) by mouth daily for 5 days   Last time this was given:  75 mg on 1/28/2018  8:14 AM                                PREPLUS 27-1 MG Tabs   TAKE 1 TABLET BY MOUTH ONCE DAILY                                triamcinolone 0.1 % ointment   Commonly known as:  KENALOG   Apply to affected chest up to twice daily as needed.                                vitamin D 2000 UNITS tablet   Take 1 tablet by mouth daily Take one tablet daily.

## 2018-01-27 LAB
ANION GAP SERPL CALCULATED.3IONS-SCNC: 8 MMOL/L (ref 3–14)
BUN SERPL-MCNC: 11 MG/DL (ref 7–30)
CALCIUM SERPL-MCNC: 8.8 MG/DL (ref 8.5–10.1)
CHLORIDE SERPL-SCNC: 110 MMOL/L (ref 94–109)
CO2 SERPL-SCNC: 24 MMOL/L (ref 20–32)
CREAT SERPL-MCNC: 0.72 MG/DL (ref 0.52–1.04)
ERYTHROCYTE [DISTWIDTH] IN BLOOD BY AUTOMATED COUNT: 12.8 % (ref 10–15)
GFR SERPL CREATININE-BSD FRML MDRD: 88 ML/MIN/1.7M2
GLUCOSE SERPL-MCNC: 88 MG/DL (ref 70–99)
HCT VFR BLD AUTO: 36.1 % (ref 35–47)
HGB BLD-MCNC: 11.2 G/DL (ref 11.7–15.7)
MCH RBC QN AUTO: 27.3 PG (ref 26.5–33)
MCHC RBC AUTO-ENTMCNC: 31 G/DL (ref 31.5–36.5)
MCV RBC AUTO: 88 FL (ref 78–100)
PLATELET # BLD AUTO: 238 10E9/L (ref 150–450)
POTASSIUM SERPL-SCNC: 4.1 MMOL/L (ref 3.4–5.3)
RBC # BLD AUTO: 4.1 10E12/L (ref 3.8–5.2)
SODIUM SERPL-SCNC: 142 MMOL/L (ref 133–144)
WBC # BLD AUTO: 7.7 10E9/L (ref 4–11)

## 2018-01-27 PROCEDURE — 25000132 ZZH RX MED GY IP 250 OP 250 PS 637: Performed by: SURGERY

## 2018-01-27 PROCEDURE — 12000001 ZZH R&B MED SURG/OB UMMC

## 2018-01-27 PROCEDURE — 25000132 ZZH RX MED GY IP 250 OP 250 PS 637: Performed by: STUDENT IN AN ORGANIZED HEALTH CARE EDUCATION/TRAINING PROGRAM

## 2018-01-27 PROCEDURE — 25000128 H RX IP 250 OP 636: Performed by: STUDENT IN AN ORGANIZED HEALTH CARE EDUCATION/TRAINING PROGRAM

## 2018-01-27 PROCEDURE — 85027 COMPLETE CBC AUTOMATED: CPT | Performed by: SURGERY

## 2018-01-27 PROCEDURE — 36415 COLL VENOUS BLD VENIPUNCTURE: CPT | Performed by: SURGERY

## 2018-01-27 PROCEDURE — 96361 HYDRATE IV INFUSION ADD-ON: CPT | Performed by: EMERGENCY MEDICINE

## 2018-01-27 PROCEDURE — 25000128 H RX IP 250 OP 636: Performed by: SURGERY

## 2018-01-27 PROCEDURE — 80048 BASIC METABOLIC PNL TOTAL CA: CPT | Performed by: SURGERY

## 2018-01-27 PROCEDURE — 25000132 ZZH RX MED GY IP 250 OP 250 PS 637

## 2018-01-27 RX ORDER — CIPROFLOXACIN 500 MG/1
500 TABLET, FILM COATED ORAL EVERY 12 HOURS SCHEDULED
Status: DISCONTINUED | OUTPATIENT
Start: 2018-01-27 | End: 2018-01-28 | Stop reason: HOSPADM

## 2018-01-27 RX ORDER — METRONIDAZOLE 500 MG/1
500 TABLET ORAL EVERY 8 HOURS SCHEDULED
Status: DISCONTINUED | OUTPATIENT
Start: 2018-01-27 | End: 2018-01-28 | Stop reason: HOSPADM

## 2018-01-27 RX ORDER — OSELTAMIVIR PHOSPHATE 75 MG/1
75 CAPSULE ORAL DAILY
Status: DISCONTINUED | OUTPATIENT
Start: 2018-01-27 | End: 2018-01-28 | Stop reason: HOSPADM

## 2018-01-27 RX ORDER — ALBUTEROL SULFATE 90 UG/1
2 AEROSOL, METERED RESPIRATORY (INHALATION) EVERY 6 HOURS PRN
Status: DISCONTINUED | OUTPATIENT
Start: 2018-01-27 | End: 2018-01-28 | Stop reason: HOSPADM

## 2018-01-27 RX ORDER — SODIUM CHLORIDE, SODIUM LACTATE, POTASSIUM CHLORIDE, CALCIUM CHLORIDE 600; 310; 30; 20 MG/100ML; MG/100ML; MG/100ML; MG/100ML
1000 INJECTION, SOLUTION INTRAVENOUS CONTINUOUS
Status: DISCONTINUED | OUTPATIENT
Start: 2018-01-27 | End: 2018-01-28 | Stop reason: HOSPADM

## 2018-01-27 RX ORDER — LEVETIRACETAM 500 MG/1
1000 TABLET ORAL 2 TIMES DAILY
Status: DISCONTINUED | OUTPATIENT
Start: 2018-01-27 | End: 2018-01-28 | Stop reason: HOSPADM

## 2018-01-27 RX ADMIN — SODIUM CHLORIDE, POTASSIUM CHLORIDE, SODIUM LACTATE AND CALCIUM CHLORIDE 1000 ML: 600; 310; 30; 20 INJECTION, SOLUTION INTRAVENOUS at 00:08

## 2018-01-27 RX ADMIN — LEVETIRACETAM 1000 MG: 500 TABLET ORAL at 20:10

## 2018-01-27 RX ADMIN — CIPROFLOXACIN HYDROCHLORIDE 500 MG: 500 TABLET, FILM COATED ORAL at 20:07

## 2018-01-27 RX ADMIN — SODIUM CHLORIDE, POTASSIUM CHLORIDE, SODIUM LACTATE AND CALCIUM CHLORIDE 1000 ML: 600; 310; 30; 20 INJECTION, SOLUTION INTRAVENOUS at 11:50

## 2018-01-27 RX ADMIN — OSELTAMIVIR PHOSPHATE 75 MG: 75 CAPSULE ORAL at 20:06

## 2018-01-27 RX ADMIN — METRONIDAZOLE 500 MG: 500 TABLET ORAL at 15:23

## 2018-01-27 RX ADMIN — METRONIDAZOLE 500 MG: 500 TABLET ORAL at 22:30

## 2018-01-27 RX ADMIN — CIPROFLOXACIN HYDROCHLORIDE 500 MG: 500 TABLET, FILM COATED ORAL at 08:16

## 2018-01-27 RX ADMIN — METRONIDAZOLE 500 MG: 500 TABLET ORAL at 09:52

## 2018-01-27 RX ADMIN — LEVETIRACETAM 1000 MG: 500 TABLET ORAL at 08:15

## 2018-01-27 NOTE — ED NOTES
[facility], [place]   ED Nurse to Floor Handoff     Mellisa Thompson is a 42 year old female who speaks English and lives alone,  in a home  They arrived in the ED by car from home    ED Chief Complaint: Abdominal Pain (onset:  1/2/18) and Pelvic Pain (onset:  1/2/18)    ED Dx;   Final diagnoses:   Crohn's disease of small intestine with other complication (H) - with perforation         Needed?: No    Allergies: No Known Allergies.  Past Medical Hx:   Past Medical History:   Diagnosis Date     Anxiety      Depression      Epilepsy (H)       Baseline Mental status: WDL  Current Mental Status changes: at basesline    Infection: No  Sepsis suspected: No  Isolation type: No active isolations     Activity level - Baseline/Home:  Independent  Activity Level - Current:   Independent    Bariatric equipment needed?: No    In the ED these meds were given:   Medications   HYDROmorphone (PF) (DILAUDID) injection 0.5 mg (not administered)   ondansetron (ZOFRAN) injection 4 mg (not administered)   naloxone (NARCAN) injection 0.1-0.4 mg (not administered)   lactated ringers infusion (1,000 mLs Intravenous New Bag 1/27/18 0008)   acetaminophen (TYLENOL) tablet 650 mg (not administered)   HYDROmorphone (DILAUDID) injection 0.2 mg (not administered)   ondansetron (ZOFRAN-ODT) ODT tab 4 mg (not administered)     Or   ondansetron (ZOFRAN) injection 4 mg (not administered)   prochlorperazine (COMPAZINE) injection 10 mg (not administered)     Or   prochlorperazine (COMPAZINE) tablet 10 mg (not administered)     Or   prochlorperazine (COMPAZINE) Suppository 25 mg (not administered)   ertapenem (INVanz) 1 g in 10 mL SWFI for IVP (not administered)   0.9% sodium chloride BOLUS (0 mLs Intravenous Stopped 1/26/18 2241)   ertapenem (INVanz) 1 g in 10 mL SWFI for IVP (1 g Intravenous Given 1/26/18 2203)       Drips running?  Yes    Home pump or pre-existing LDA's present? No    Labs results:   Labs Ordered  "and Resulted from Time of ED Arrival Up to the Time of Departure from the ED   CRP INFLAMMATION - Abnormal; Notable for the following:        Result Value    CRP Inflammation 10.0 (*)     All other components within normal limits   UA MACROSCOPIC WITH REFLEX TO MICRO AND CULTURE - Abnormal; Notable for the following:     Specific Gravity Urine 1.043 (*)     Blood Urine Small (*)     Protein Albumin Urine 10 (*)     RBC Urine 5 (*)     Bacteria Urine Few (*)     Squamous Epithelial /HPF Urine 3 (*)     Mucous Urine Present (*)     All other components within normal limits   CBC WITH PLATELETS DIFFERENTIAL   BASIC METABOLIC PANEL   HCG QUALITATIVE URINE   ERYTHROCYTE SEDIMENTATION RATE AUTO   PERIPHERAL IV CATHETER   IP ASSIGN PROVIDER TEAM TO TREATMENT TEAM   VITAL SIGNS   ACTIVITY   INTAKE AND OUTPUT   NO INDWELLING URINARY CATHETER (FLOR) PRESENT OR NEEDED   BLADDER SCAN   APPLY PNEUMATIC COMPRESSION DEVICE (PCD)   NONE OF THE ABOVE CORE MEASURE DIAGNOSES       Imaging Studies: No results found for this or any previous visit (from the past 24 hour(s)).    Recent vital signs:   /52  Pulse 69  Temp 99  F (37.2  C) (Oral)  Resp 19  Ht 1.626 m (5' 4\")  Wt 76.2 kg (168 lb)  SpO2 98%  BMI 28.84 kg/m2    Cardiac Rhythm: Bradycardia  Pt needs tele? No  Skin/wound Issues: None    Code Status: Full Code    Pain control: good    Nausea control: pt had none    Abnormal labs/tests/findings requiring intervention:     Family present during ED course? No   Family Comments/Social Situation comments:     Tasks needing completion: None    Rachell Danielson RN  ascom-- [phone] [place] [facility]  [phone] [facility] ED    "

## 2018-01-27 NOTE — PROGRESS NOTES
Surgery Progress Note  DOS: 1/27/2018    Subjective: No acute issues overnight. Doing well on clears. Endorses bowel function. Abd pain is improved since admission.     Objective:  Vitals reviewed AVSS    Temp:  [98.2  F (36.8  C)-99  F (37.2  C)] 98.2  F (36.8  C)  Pulse:  [69] 69  Heart Rate:  [42-69] 59  Resp:  [16-20] 16  BP: (106-116)/(52-69) 106/52  SpO2:  [95 %-100 %] 96 %    Exam  General: NAD, alert  Neck: Supple, no tracheal deviation  Cardiovascular: RRR  Pumonary: Non labored breathing on RA  Abdomen: Soft, non distended, mildly tender in LLQ.  : voiding  Ext: W/o edema, wwp  Neuro: No gross focal deficit     Labs 1/26  Wbc 8.8   A/P   Mellisa Thompson is a 42 year old female who presents with new onset LLQ pain. CT demonstrates extraluminal air over LLQ. Differential includes contained perforation involving small bowel vs. Diverticulitis vs. Inflammatory bowel disease. Abdominal exam remains benign. Patient continues to have antegrade bowel function.    Will continue medical management with antibiotics  Trial of low residue diet  Transition to po a/b once tolerating diet   Anticipate discharge in 1-2 days on po antibiotics. Should she clinically worsen, can repeat CT scan and evaluate for IR drainage/surgical intervention    Shahzad Maxwell MD  Surgery, PGY-5  [phone]

## 2018-01-27 NOTE — CONSULTS
GENERAL SURGERY H&P/CONSULT  January 26, 2018      ASSESSMENT:     Mellisa Thompson is a 42 year old female who presents with new onset LLQ pain. Patient is hemodynamically stable, with no leukocytosis, and slightly elevated CRP. CT demonstrates extraluminal air over LLQ.Differential includes perforated small bowel vs. Diverticulitis vs. Inflammatory bowel disease. Currently the patient is hemodynamically stable and doesn't need an emergent surgical intervention    PLAN:    - Admit for observation   - Clear liquid diet  - Serial abdominal exams  -  ml/hr  - IV Ertapenem Q24H  - IV Dilaudid 0.5 mg PRN  - IV Zofran 4 mg PRN    Patient's findings and plan discussed with chief resident Dr. Maxwell and staff, Dr. Juárez.      HISTORY PRESENTING ILLNESS: Mellisa Thompson is a 42 year old female with a history of epilepsy who presents with LLQ pain. Patient reports new onset diffuse abdominal pain that that began 3 weeks ago, however, over the past week, it became constant and increased in severity prompting her to see her PCP. PCP ordered a CT which demonstrated some free air. Initially, when her pain first began she had some nausea, vomiting, and diarrhea associated with the pain however, she reports that those symptoms have since resolved. Patient has been passing a lot of gas as well as burping more than usual. BM are regular, last one was yesterday. She denies any constipation, diarrhea, or blood in stool. She has been tolerating her diet, however, she has been eating smaller quantities. No fever, chills, or trouble voiding. Family history of diverticulitis, but denies hx of inflammatory disorders. She has never had a colonoscopy before.       REVIEW OF SYSTEMS: As noted above. No headache, dizziness. No chest pain or shortness of breath. No urinary difficulties. No muscle or body aches.    PAST MEDICAL HISTORY:   Past Medical History:   Diagnosis Date     Anxiety      Depression      Epilepsy (H)        SURGICAL  HISTORY:   Past Surgical History:   Procedure Laterality Date     ANKLE SURGERY Right 1999     BRAIN SURGERY  2007    right temporal lobectomy        SOCIAL HISTORY: Tobacco - None  Etoh - Socail. Drugs - None.  Works as .   Social History     Social History     Marital status:      Spouse name: N/A     Number of children: N/A     Years of education: N/A     Occupational History     admistration      in [employer]      Social History Main Topics     Smoking status: Never Smoker     Smokeless tobacco: Never Used     Alcohol use 0.0 oz/week     0 Standard drinks or equivalent per week      Comment: socially     Drug use: No     Sexual activity: Yes     Partners: Male     Other Topics Concern     Not on file     Social History Narrative    First-       FAMILY HISTORY: No bleeding/clotting disorders nor problems with anesthesia.   Family History   Problem Relation Age of Onset     Hypertension Mother      CANCER Father      prostate     CANCER Paternal Grandfather      DIABETES Maternal Grandmother      Melanoma No family hx of      Skin Cancer No family hx of        ALLERGIES:    No Known Allergies    MEDICATIONS:    No current facility-administered medications on file prior to encounter.   Current Outpatient Prescriptions on File Prior to Encounter:  levETIRAcetam 1000 MG TABS Take 1,000 mg by mouth 2 times daily   albuterol (VENTOLIN HFA) 108 (90 BASE) MCG/ACT Inhaler Inhale 2 puffs into the lungs every 6 hours as needed for shortness of breath / dyspnea or wheezing (Patient not taking: Reported on 11/22/2017)   benzonatate (TESSALON) 100 MG capsule Take 1 capsule (100 mg) by mouth 3 times daily as needed for cough (Patient not taking: Reported on 11/22/2017)   Prenatal Vit-Fe Fumarate-FA (PREPLUS) 27-1 MG TABS TAKE 1 TABLET BY MOUTH ONCE DAILY (Patient not taking: Reported on 11/22/2017)   order for DME Equipment being ordered: Light box 10,000 Lux for seasonal  affective disorder code F39   Cholecalciferol (VITAMIN D) 2000 UNITS tablet Take 1 tablet by mouth daily Take one tablet daily.   Omega-3 Fatty Acids (OMEGA III EPA+DHA) 1000 MG CAPS Take one twice a day (Patient not taking: Reported on 11/22/2017)   ALPRAZolam (XANAX) 0.25 MG tablet Take 1 tablet (0.25 mg) by mouth 3 times daily as needed for anxiety   triamcinolone (KENALOG) 0.1 % ointment Apply to affected chest up to twice daily as needed. (Patient not taking: Reported on 11/22/2017)   minoxidil 5 % SOLN Apply topically once daily to entire scalp. . (Patient not taking: Reported on 11/22/2017)       PHYSICAL EXAMINATION:  Temp:  [99  F (37.2  C)] 99  F (37.2  C)  Pulse:  [69] 69  Heart Rate:  [69] 69  Resp:  [18] 18  BP: (116)/(56) 116/56  SpO2:  [95 %-100 %] 100 %  General: Alert, lying down in bed,  in no acute distress.  Respiratory: Non-labored breathing.   Cardiovascular: Regular rate and rhythm.   Gastrointestinal: Abdomen soft, non-distended, tender to palpation of LLQ. No peritoneal signs   Extremities: Moving all four extremities.   Skin: As noted above. No rashes or lesions appreciated.    LABS: Reviewed.   Complete Blood Count     Recent Labs  Lab 01/26/18 2119   WBC 8.8   HGB 12.7        Basic Metabolic Panel    Recent Labs  Lab 01/26/18  2119      POTASSIUM 3.4   CHLORIDE 107   CO2 25   BUN 11   CR 0.67   GLC 80       IMAGING:  OSH CT A/P: extraluminal air over LLQ with concern for perforated small bowel. Diverticulosis seen without evidence of diverticulitis.       CO-MORBIDITIES:   No diagnosis found.      Dain Castañeda MD  General Surgery, PGY-2  [phone]

## 2018-01-27 NOTE — ED NOTES
Bed: IN01  Expected date: 1/26/18  Expected time:   Means of arrival:   Comments:  OmidavivaMellisa hyman 1975  Seen by [facility] yesterday.  Abdominal and pelvic CT scan shows small bowel Crohn's with perforation and contained abscess.  CRP yesterday was elevated at 50 and the remainder of the labs were unremarkable.  The patient is from [facility] and was in [place] 3 weeks ago and got a GI bug.  CT done at [facility] in [place]

## 2018-01-27 NOTE — PROGRESS NOTES
"Admitted with new onset LLQ pain. AVSS.Pain at tolerable level. Denies n/v. Advanced to low fiber diet. On LR 50ml.hr. Will continue to monitor..  BP 97/50 (BP Location: Right arm)  Pulse 69  Temp 98  F (36.7  C) (Oral)  Resp 16  Ht 1.626 m (5' 4\")  Wt 78 kg (171 lb 15.3 oz)  SpO2 97%  BMI 29.52 kg/m2    "

## 2018-01-27 NOTE — ED PROVIDER NOTES
[place] EMERGENCY DEPARTMENT ([facility])  1/26/18  ED 6   History     Chief Complaint   Patient presents with     Abdominal Pain     onset:  1/2/18     Pelvic Pain     onset:  1/2/18     The history is provided by the patient and medical records.     Mellisa Thompson is a 42 year old female who presents to the ER after she had an abdominal CT done today at [facility].  Patient was seen by our Gastroenterology clinic yesterday and was sent for CT today.  The patient has had some recent travel to [place] and has developed some left lower quadrant abdominal pain over the past week.  By report the CT reveals diverticulitis with a small rupture that is contained.  The patient's images were pushed into our PACS system.  Patient states that she has left lower quadrant abdominal pain with nausea and states that she has not eaten since 10 AM this morning.  Patient denies any diarrhea, melena, or bright blood per rectum.  Patient denies any UTI symptoms.    I have reviewed the Medications, Allergies, Past Medical and Surgical History, and Social History in the [employer] system.  PAST MEDICAL HISTORY:   Past Medical History:   Diagnosis Date     Anxiety      Depression      Epilepsy (H)        PAST SURGICAL HISTORY:   Past Surgical History:   Procedure Laterality Date     ANKLE SURGERY Right 1999     BRAIN SURGERY  2007    right temporal lobectomy        FAMILY HISTORY:   Family History   Problem Relation Age of Onset     Hypertension Mother      CANCER Father      prostate     CANCER Paternal Grandfather      DIABETES Maternal Grandmother      Melanoma No family hx of      Skin Cancer No family hx of        SOCIAL HISTORY:   Social History   Substance Use Topics     Smoking status: Never Smoker     Smokeless tobacco: Never Used     Alcohol use 0.0 oz/week     0 Standard drinks or equivalent per week      Comment: socially       Patient's Medications   New Prescriptions    No medications on file   Previous Medications     "ALBUTEROL (VENTOLIN HFA) 108 (90 BASE) MCG/ACT INHALER    Inhale 2 puffs into the lungs every 6 hours as needed for shortness of breath / dyspnea or wheezing    ALPRAZOLAM (XANAX) 0.25 MG TABLET    Take 1 tablet (0.25 mg) by mouth 3 times daily as needed for anxiety    BENZONATATE (TESSALON) 100 MG CAPSULE    Take 1 capsule (100 mg) by mouth 3 times daily as needed for cough    CHOLECALCIFEROL (VITAMIN D) 2000 UNITS TABLET    Take 1 tablet by mouth daily Take one tablet daily.    LEVETIRACETAM 1000 MG TABS    Take 1,000 mg by mouth 2 times daily    MINOXIDIL 5 % SOLN    Apply topically once daily to entire scalp. .    OMEGA-3 FATTY ACIDS (OMEGA III EPA+DHA) 1000 MG CAPS    Take one twice a day    ORDER FOR DME    Equipment being ordered: Light box 10,000 Lux for seasonal affective disorder code F39    PRENATAL VIT-FE FUMARATE-FA (PREPLUS) 27-1 MG TABS    TAKE 1 TABLET BY MOUTH ONCE DAILY    TRIAMCINOLONE (KENALOG) 0.1 % OINTMENT    Apply to affected chest up to twice daily as needed.   Modified Medications    No medications on file   Discontinued Medications    No medications on file        No Known Allergies     Review of Systems   Constitutional: Positive for appetite change (poor, hasn't eaten since 10am today. ).   Gastrointestinal: Positive for abdominal pain (left lower quadrant x 1 week) and nausea. Negative for blood in stool, diarrhea and vomiting.   Genitourinary: Negative for dysuria, frequency, hematuria and urgency.   All other systems reviewed and are negative.      Physical Exam   BP: 116/56  Pulse: 69  Heart Rate: 69  Temp: 99  F (37.2  C)  Resp: 18  Height: 162.6 cm (5' 4\")  Weight: 76.2 kg (168 lb)  SpO2: 98 %      Physical Exam   Constitutional: She is oriented to person, place, and time.   Alert and conversant   HENT:   Head: Atraumatic.   Eyes: EOM are normal. Pupils are equal, round, and reactive to light.   Neck: Neck supple.   Cardiovascular: Normal heart sounds.    Pulmonary/Chest: Breath " sounds normal.   Abdominal: Soft. There is no rebound.   Mild tenderness in the left lower quadrant   Musculoskeletal: She exhibits no edema or tenderness.   Neurological: She is alert and oriented to person, place, and time.   Grossly intact and symmetric   Skin: No rash noted.   Psychiatric: She has a normal mood and affect.       ED Course     ED Course   Comment Time   General surgery contacted and will see the patient in the ER. 01/26 2155     Procedures        IV established for blood draw and medication administration.    CT results from [facility] were obtained and reviewed.    Results for orders placed or performed during the hospital encounter of 01/26/18 (from the past 24 hour(s))   CBC with platelets differential   Result Value Ref Range    WBC 8.8 4.0 - 11.0 10e9/L    RBC Count 4.57 3.8 - 5.2 10e12/L    Hemoglobin 12.7 11.7 - 15.7 g/dL    Hematocrit 40.0 35.0 - 47.0 %    MCV 88 78 - 100 fl    MCH 27.8 26.5 - 33.0 pg    MCHC 31.8 31.5 - 36.5 g/dL    RDW 12.8 10.0 - 15.0 %    Platelet Count 319 150 - 450 10e9/L    Diff Method Automated Method     % Neutrophils 52.9 %    % Lymphocytes 39.9 %    % Monocytes 5.0 %    % Eosinophils 1.6 %    % Basophils 0.5 %    % Immature Granulocytes 0.1 %    Nucleated RBCs 0 0 /100    Absolute Neutrophil 4.7 1.6 - 8.3 10e9/L    Absolute Lymphocytes 3.5 0.8 - 5.3 10e9/L    Absolute Monocytes 0.4 0.0 - 1.3 10e9/L    Absolute Eosinophils 0.1 0.0 - 0.7 10e9/L    Absolute Basophils 0.0 0.0 - 0.2 10e9/L    Abs Immature Granulocytes 0.0 0 - 0.4 10e9/L    Absolute Nucleated RBC 0.0    Basic metabolic panel   Result Value Ref Range    Sodium 140 133 - 144 mmol/L    Potassium 3.4 3.4 - 5.3 mmol/L    Chloride 107 94 - 109 mmol/L    Carbon Dioxide 25 20 - 32 mmol/L    Anion Gap 9 3 - 14 mmol/L    Glucose 80 70 - 99 mg/dL    Urea Nitrogen 11 7 - 30 mg/dL    Creatinine 0.67 0.52 - 1.04 mg/dL    GFR Estimate >90 >60 mL/min/1.7m2    GFR Estimate If Black >90 >60 mL/min/1.7m2    Calcium 9.7 8.5 -  10.1 mg/dL   Erythrocyte sedimentation rate auto   Result Value Ref Range    Sed Rate 17 0 - 20 mm/h   CRP inflammation   Result Value Ref Range    CRP Inflammation 10.0 (H) 0.0 - 8.0 mg/L   HCG qualitative urine   Result Value Ref Range    HCG Qual Urine Negative NEG^Negative   UA reflex to Microscopic and Culture   Result Value Ref Range    Color Urine Yellow     Appearance Urine Clear     Glucose Urine Negative NEG^Negative mg/dL    Bilirubin Urine Negative NEG^Negative    Ketones Urine Negative NEG^Negative mg/dL    Specific Gravity Urine 1.043 (H) 1.003 - 1.035    Blood Urine Small (A) NEG^Negative    pH Urine 6.0 5.0 - 7.0 pH    Protein Albumin Urine 10 (A) NEG^Negative mg/dL    Urobilinogen mg/dL Normal 0.0 - 2.0 mg/dL    Nitrite Urine Negative NEG^Negative    Leukocyte Esterase Urine Negative NEG^Negative    Source Midstream Urine     RBC Urine 5 (H) 0 - 2 /HPF    WBC Urine 1 0 - 2 /HPF    Bacteria Urine Few (A) NEG^Negative /HPF    Squamous Epithelial /HPF Urine 3 (H) 0 - 1 /HPF    Mucous Urine Present (A) NEG^Negative /LPF     Medications   HYDROmorphone (PF) (DILAUDID) injection 0.5 mg (not administered)   ondansetron (ZOFRAN) injection 4 mg (not administered)   0.9% sodium chloride BOLUS (0 mLs Intravenous Stopped 1/26/18 2241)   ertapenem (INVanz) 1 g in 10 mL SWFI for IVP (1 g Intravenous Given 1/26/18 2203)         Assessments & Plan (with Medical Decision Making)     I have reviewed the nursing notes.    General surgery saw the patient in the ER and recommended overnight hospitalization for observation.    I have reviewed the findings, diagnosis, and plan with the patient.    Final diagnoses:   Crohn's disease of small intestine with other complication (H) - with perforation     Sami Caro MD    1/26/2018   [facility], [place], EMERGENCY DEPARTMENT     Sami Caro MD  01/26/18 5109

## 2018-01-28 VITALS
OXYGEN SATURATION: 98 % | WEIGHT: 171.96 LBS | DIASTOLIC BLOOD PRESSURE: 56 MMHG | HEART RATE: 58 BPM | BODY MASS INDEX: 29.36 KG/M2 | HEIGHT: 64 IN | TEMPERATURE: 98.1 F | RESPIRATION RATE: 16 BRPM | SYSTOLIC BLOOD PRESSURE: 94 MMHG

## 2018-01-28 LAB
ERYTHROCYTE [DISTWIDTH] IN BLOOD BY AUTOMATED COUNT: 12.8 % (ref 10–15)
HCT VFR BLD AUTO: 35.6 % (ref 35–47)
HGB BLD-MCNC: 11.3 G/DL (ref 11.7–15.7)
MCH RBC QN AUTO: 27.5 PG (ref 26.5–33)
MCHC RBC AUTO-ENTMCNC: 31.7 G/DL (ref 31.5–36.5)
MCV RBC AUTO: 87 FL (ref 78–100)
PLATELET # BLD AUTO: 218 10E9/L (ref 150–450)
RBC # BLD AUTO: 4.11 10E12/L (ref 3.8–5.2)
WBC # BLD AUTO: 6.5 10E9/L (ref 4–11)

## 2018-01-28 PROCEDURE — 25000132 ZZH RX MED GY IP 250 OP 250 PS 637: Performed by: STUDENT IN AN ORGANIZED HEALTH CARE EDUCATION/TRAINING PROGRAM

## 2018-01-28 PROCEDURE — 85027 COMPLETE CBC AUTOMATED: CPT | Performed by: STUDENT IN AN ORGANIZED HEALTH CARE EDUCATION/TRAINING PROGRAM

## 2018-01-28 PROCEDURE — 36415 COLL VENOUS BLD VENIPUNCTURE: CPT | Performed by: STUDENT IN AN ORGANIZED HEALTH CARE EDUCATION/TRAINING PROGRAM

## 2018-01-28 PROCEDURE — 25000132 ZZH RX MED GY IP 250 OP 250 PS 637

## 2018-01-28 PROCEDURE — 25000132 ZZH RX MED GY IP 250 OP 250 PS 637: Performed by: SURGERY

## 2018-01-28 PROCEDURE — 25000128 H RX IP 250 OP 636: Performed by: STUDENT IN AN ORGANIZED HEALTH CARE EDUCATION/TRAINING PROGRAM

## 2018-01-28 RX ORDER — OSELTAMIVIR PHOSPHATE 75 MG/1
75 CAPSULE ORAL DAILY
Qty: 5 CAPSULE | Refills: 0 | Status: SHIPPED | OUTPATIENT
Start: 2018-01-28 | End: 2018-02-02

## 2018-01-28 RX ORDER — CIPROFLOXACIN 500 MG/1
500 TABLET, FILM COATED ORAL EVERY 12 HOURS
Qty: 24 TABLET | Refills: 0 | Status: SHIPPED | OUTPATIENT
Start: 2018-01-28 | End: 2018-02-09

## 2018-01-28 RX ORDER — METRONIDAZOLE 500 MG/1
500 TABLET ORAL EVERY 8 HOURS
Qty: 37 TABLET | Refills: 0 | Status: SHIPPED | OUTPATIENT
Start: 2018-01-28 | End: 2018-02-09

## 2018-01-28 RX ADMIN — SODIUM CHLORIDE, POTASSIUM CHLORIDE, SODIUM LACTATE AND CALCIUM CHLORIDE 1000 ML: 600; 310; 30; 20 INJECTION, SOLUTION INTRAVENOUS at 06:32

## 2018-01-28 RX ADMIN — CIPROFLOXACIN HYDROCHLORIDE 500 MG: 500 TABLET, FILM COATED ORAL at 08:14

## 2018-01-28 RX ADMIN — OSELTAMIVIR PHOSPHATE 75 MG: 75 CAPSULE ORAL at 08:14

## 2018-01-28 RX ADMIN — LEVETIRACETAM 1000 MG: 500 TABLET ORAL at 08:14

## 2018-01-28 RX ADMIN — METRONIDAZOLE 500 MG: 500 TABLET ORAL at 06:19

## 2018-01-28 NOTE — PROGRESS NOTES
Patient alert and oriented X4. Reported pain to the abdomen but did not want any pain interventions. Patient reports pain improvement. On oral antibiotics. Denies N/V and tolerating Low Fiber Diet.IVF LR at 50ml.hr. Able to make needs known. Will continue to monitor.

## 2018-01-28 NOTE — DISCHARGE SUMMARY
"[facility]  Discharge Summary  General Surgery     Mellisa Thompson MRN# [id number]   YOB: 1975 Age: 42 year old     Date of Admission:  1/26/2018  Date of Discharge::  1/28/2018 11:28 AM  Admitting Physician:  Justice Juárez MD  Discharge Physician:    Primary Care Physician:        Ree Michele          Admission Diagnoses:   Crohn's disease of small intestine with other complication (H) [K50.018]            Discharge Diagnosis:   Perforated diverticulitis         Procedures:   None this admission          Brief History of Illness:   Taken from Admission H&P:  \"Mellisa Thompson is a 42 year old female with a history of epilepsy who presents with LLQ pain. Patient reports new onset diffuse abdominal pain that that began 3 weeks ago, however, over the past week, it became constant and increased in severity prompting her to see her PCP. PCP ordered a CT which demonstrated some free air. Initially, when her pain first began she had some nausea, vomiting, and diarrhea associated with the pain however, she reports that those symptoms have since resolved. Patient has been passing a lot of gas as well as burping more than usual. BM are regular, last one was yesterday. She denies any constipation, diarrhea, or blood in stool. She has been tolerating her diet, however, she has been eating smaller quantities. No fever, chills, or trouble voiding. Family history of diverticulitis, but denies hx of inflammatory disorders. She has never had a colonoscopy before.\"           Hospital Course:   Pt continued to improve clinically with IV antibiotics. On HD3, she was tolerating a regular diet, had well controlled pain, able to ambulate and void independently, have a bowel movement, and felt comfortable to discharge home. She was advised to follow-up with a gastroenterologist for a colonoscopy in 6 weeks. Prior to discharge, she was transitioned to PO levo/flagyl and instructed to continue as " "outpatient for 14 days total.          Day of Discharge Physical Exam:   Blood pressure 94/56, pulse 58, temperature 98.1  F (36.7  C), temperature source Oral, resp. rate 16, height 1.626 m (5' 4\"), weight 78 kg (171 lb 15.3 oz), SpO2 98 %, not currently breastfeeding.    Gen: AAOx3, NAD  Pulm: Non-labored breathing  Abd: Soft, appropriately tender, no guarding  Ext:  Warm and well-perfused         Final Pathology Result:   No pathology submitted           Discharge Instructions and Follow-Up:     Discharge Procedure Orders  Reason for your hospital stay   Order Comments: Perforated Diverticulitis     Adult [facility] Follow-up and recommended labs and tests   Order Comments: Follow up with Dr. Juárez , at [facility] , within 1 week  for hospital follow- up. No follow up labs or test are needed.    Appointments on [place] and/or [facility] (with [facility] or [facility] provider or service). Call [phone] if you haven't heard regarding these appointments within 7 days of discharge.              Home Health Care:   Not needed           Discharge Disposition:   Discharged to home      Condition at discharge: Stable         Consultations:   None         Imaging Studies:     Results for orders placed or performed in visit on 09/06/17   US Breast Left Limited 1-3 Quadrants    Narrative    Examinations: US BREAST LEFT LIMITED 1-3 QUADRANTS, MA DIAGNOSTIC LEFT  W/ LIU, 9/6/2017 2:38 PM    Comparisons: 8/20/2017, 4/28/2010    History: Left breast screening callback. Family history of ovarian  cancer in aunt, age 50.    Breast Density: Scattered fibroglandular densities    Findings:     Right breast tomosynthesis demonstrates oval circumscribed isodense  mass in the left breast at approximately the 1 to 2:00 position at mid  depth. No abnormality on tomosynthesis is confirmed at the 10:00  position 9 cm from the nipple as described on screening mammogram.    Targeted ultrasound evaluation of the left breast was performed by " the  technologist and radiologist. 1.0 x 0.5 x 1.3 cm complicated cyst at  the 1:00 position 6 cm from the nipple corresponds to the mass seen on  tomosynthesis. No suspicious findings at the 10:00 position 9 cm from  the nipple.      Impression    Impression: BI-RADS CATEGORY: 2 - Benign Finding(s)    Recommended Follow-up: Annual Mammography.    Findings and recommendations discussed with the patient.    I have personally reviewed the examination and initial interpretation  and I agree with the findings.    MAYANK CHRISTIAN MD              Medications Prior to Admission:     No prescriptions prior to admission.              Discharge Medications:     Discharge Medication List as of 1/28/2018 11:10 AM      START taking these medications    Details   oseltamivir (TAMIFLU) 75 MG capsule Take 1 capsule (75 mg) by mouth daily for 5 days, Disp-5 capsule, R-0, E-Prescribe      ciprofloxacin (CIPRO) 500 MG tablet Take 1 tablet (500 mg) by mouth every 12 hours for 12 days, Disp-24 tablet, R-0, E-Prescribe      metroNIDAZOLE (FLAGYL) 500 MG tablet Take 1 tablet (500 mg) by mouth every 8 hours for 12 days, Disp-37 tablet, R-0, E-Prescribe         CONTINUE these medications which have NOT CHANGED    Details   levETIRAcetam 1000 MG TABS Take 1,000 mg by mouth 2 times daily, Disp-180 tablet, R-3, E-Prescribe      albuterol (VENTOLIN HFA) 108 (90 BASE) MCG/ACT Inhaler Inhale 2 puffs into the lungs every 6 hours as needed for shortness of breath / dyspnea or wheezing, Disp-1 Inhaler, R-0, E-Prescribe      benzonatate (TESSALON) 100 MG capsule Take 1 capsule (100 mg) by mouth 3 times daily as needed for cough, Disp-42 capsule, R-0, E-Prescribe      Prenatal Vit-Fe Fumarate-FA (PREPLUS) 27-1 MG TABS TAKE 1 TABLET BY MOUTH ONCE DAILY, Disp-90 tablet, R-3, E-Prescribe      order for DME Equipment being ordered: Light box 10,000 Lux for seasonal affective disorder code [id number] Box, R-0, Local Print      Cholecalciferol (VITAMIN D) 2000  UNITS tablet Take 1 tablet by mouth daily Take one tablet daily., Disp-90 tablet, R-3, E-Prescribe      Omega-3 Fatty Acids (OMEGA III EPA+DHA) 1000 MG CAPS Take one twice a day, Disp-90 capsule, R-1, E-Prescribe      ALPRAZolam (XANAX) 0.25 MG tablet Take 1 tablet (0.25 mg) by mouth 3 times daily as needed for anxiety, Historical      triamcinolone (KENALOG) 0.1 % ointment Apply to affected chest up to twice daily as needed.Disp-80 g, [id number]      minoxidil 5 % SOLN Apply topically once daily to entire scalp. .Disp-60 mL, R-0OTC             Pt was seen and discussed with Dr. Juárez on day of discharge.    Solomon Muhammad MD  PGY-1 Surgery  pager [phone]

## 2018-01-28 NOTE — PROVIDER NOTIFICATION
"Dr. Maxwell paged RE: \"[phone] - Can someone from team please see pt? Thanks! [phone]\"  "

## 2018-01-28 NOTE — PROGRESS NOTES
Patient stable during shift, AVSS on room air. Afebrile. Patient tolerating oral antibiotics, and low fiber diet. Patient endorses pain in LLQ but denies any pain medication. Pt states it is better when she lays down but getting up is more painful. Patient had a BM today and has good UO. Patient moved to a private room today due to conflict with her roommate, later it was discovered that the roommate had tested positive for Influenza A. MD notified, and tamiflu ordered prophylactic.

## 2018-01-29 ENCOUNTER — CARE COORDINATION (OUTPATIENT)
Dept: SURGERY | Facility: CLINIC | Age: 43
End: 2018-01-29

## 2018-01-29 NOTE — PROGRESS NOTES
RN Post-Op/Post-Discharge Care Coordination Note    Spoke with Patient.    Support  Patient able to care for self independently     Health Status  Fevers/chills: Patient denies any fever or chills.  Nausea/Vomiting: Patient denies nausea/vomiting.  Eating/drinking: Patient is able to eat and drink without any complaints.  Bowel habits: Patient reports having loose stool.   Urinary: Voiding normally  Drains (LIDIA): N/A  Incisions: na.  Wound closure:  N/A   Pain: patient states she has some discomfort but hasn't needed anything for pain.  New Medications:  She continues on antibiotics as ordered.    Activity/Restrictions  Return to normal activites as tolerated    Equipment  None    Pathology reviewed with patient:  N/A    All of her questions were answered including reviewing restrictions, pathology, and wound care.  She will call this office if she has any further questions and/or concerns.      She plans on following up with her GI doctor (out of the [employer] system) to discuss recommendations.    Whom and When to Call  Patient acknowledges understanding of how to manage any medication changes and   when to seek medical care.     Patient advised that if after hour medical concerns arise to please call [phone] and choose option 4 to speak to the physician on call.     A copy of this note was routed to the primary surgeon.

## 2018-01-29 NOTE — PROGRESS NOTES
Mellisa Thompson is a patient of Dr. Justice Juárez that was admitted for a Perforated diverticulitis from 1/26-1/28.  Attempted to contact patient via telephone for a status update and review post op teaching.  LM on VM to call office.  Await return call.      Of note:  Pathology:  na  Wound:  N/A  Follow-up:  Pt to f/u with PCP in 1-2 weeks  Restrictions:  - No activity restrictions  New medications:  Cipro, Flagyl (12 days), Oseltamivir

## 2018-02-06 ENCOUNTER — TELEPHONE (OUTPATIENT)
Dept: GASTROENTEROLOGY | Facility: CLINIC | Age: 43
End: 2018-02-06

## 2018-02-06 ENCOUNTER — CARE COORDINATION (OUTPATIENT)
Dept: GASTROENTEROLOGY | Facility: CLINIC | Age: 43
End: 2018-02-06

## 2018-02-06 NOTE — PROGRESS NOTES
Called to offer patient sooner GI appointment. Left voicemail with clinic contact information.       [facility]       Phone Number: [phone]                     Pt dx Crohn's disease of small intestine with other complication and has a family hx of Diverticulitis pt was referred to follow up with GI clinic by [facility]. We scheduled an a tentative appt for March.     Please call patient @ [phone] if we can get her in earlier     Thank you!     Sheri Lilly   Intake representative   Call Center

## 2018-02-06 NOTE — TELEPHONE ENCOUNTER
APPT INFO    Date /Time: 2/7/18 10AM    Reason for Appt: Crohn's disease of small intestine with other complication/ family hx of Diverticulitis/ all records in EPIC   Ref Provider/Clinic: [facility]    Are there internal records? Yes/No?  IF YES, list clinic names: [facility] (referring)   [facility] ED Notes 1/2018 / Images in PACS        Are there outside records? Yes/No? Yes    Patient Contact (Y/N) & Call Details: No, pt was referred.    Action: Closing encounter      OUTSIDE RECORDS CHECKLIST     CLINIC NAME COMMENTS REC (x) IMG (x)   [facility]  Transferred recs scanned in Epic 1/25/18  X None    [facility]  CT Abd Pelvis 1/26/18 (IN PACS)  X X

## 2018-02-07 ENCOUNTER — TELEPHONE (OUTPATIENT)
Dept: GASTROENTEROLOGY | Facility: CLINIC | Age: 43
End: 2018-02-07

## 2018-02-07 ENCOUNTER — PRE VISIT (OUTPATIENT)
Dept: GASTROENTEROLOGY | Facility: CLINIC | Age: 43
End: 2018-02-07

## 2018-02-07 ENCOUNTER — OFFICE VISIT (OUTPATIENT)
Dept: GASTROENTEROLOGY | Facility: CLINIC | Age: 43
End: 2018-02-07
Payer: COMMERCIAL

## 2018-02-07 VITALS
TEMPERATURE: 98.1 F | DIASTOLIC BLOOD PRESSURE: 64 MMHG | WEIGHT: 170.3 LBS | HEIGHT: 64 IN | BODY MASS INDEX: 29.08 KG/M2 | OXYGEN SATURATION: 100 % | SYSTOLIC BLOOD PRESSURE: 116 MMHG | HEART RATE: 66 BPM

## 2018-02-07 DIAGNOSIS — R10.13 ABDOMINAL PAIN, EPIGASTRIC: ICD-10-CM

## 2018-02-07 DIAGNOSIS — K63.1 PERFORATED BOWEL (H): Primary | ICD-10-CM

## 2018-02-07 DIAGNOSIS — K63.1 PERFORATED BOWEL (H): ICD-10-CM

## 2018-02-07 LAB
ALBUMIN SERPL-MCNC: 3.5 G/DL (ref 3.4–5)
ALP SERPL-CCNC: 64 U/L (ref 40–150)
ALT SERPL W P-5'-P-CCNC: 22 U/L (ref 0–50)
ANION GAP SERPL CALCULATED.3IONS-SCNC: 6 MMOL/L (ref 3–14)
AST SERPL W P-5'-P-CCNC: 16 U/L (ref 0–45)
BASOPHILS # BLD AUTO: 0.1 10E9/L (ref 0–0.2)
BASOPHILS NFR BLD AUTO: 0.8 %
BILIRUB DIRECT SERPL-MCNC: 0.1 MG/DL (ref 0–0.2)
BILIRUB SERPL-MCNC: 0.4 MG/DL (ref 0.2–1.3)
BUN SERPL-MCNC: 15 MG/DL (ref 7–30)
CALCIUM SERPL-MCNC: 8.7 MG/DL (ref 8.5–10.1)
CHLORIDE SERPL-SCNC: 108 MMOL/L (ref 94–109)
CO2 SERPL-SCNC: 26 MMOL/L (ref 20–32)
CREAT SERPL-MCNC: 0.65 MG/DL (ref 0.52–1.04)
CRP SERPL-MCNC: <2.9 MG/L (ref 0–8)
DIFFERENTIAL METHOD BLD: NORMAL
EOSINOPHIL # BLD AUTO: 0.1 10E9/L (ref 0–0.7)
EOSINOPHIL NFR BLD AUTO: 1.6 %
ERYTHROCYTE [DISTWIDTH] IN BLOOD BY AUTOMATED COUNT: 12.9 % (ref 10–15)
ERYTHROCYTE [SEDIMENTATION RATE] IN BLOOD BY WESTERGREN METHOD: 13 MM/H (ref 0–20)
GFR SERPL CREATININE-BSD FRML MDRD: >90 ML/MIN/1.7M2
GLUCOSE SERPL-MCNC: 86 MG/DL (ref 70–99)
HCT VFR BLD AUTO: 37.9 % (ref 35–47)
HGB BLD-MCNC: 12.1 G/DL (ref 11.7–15.7)
IMM GRANULOCYTES # BLD: 0 10E9/L (ref 0–0.4)
IMM GRANULOCYTES NFR BLD: 0.3 %
LYMPHOCYTES # BLD AUTO: 2.3 10E9/L (ref 0.8–5.3)
LYMPHOCYTES NFR BLD AUTO: 32.7 %
MCH RBC QN AUTO: 27.9 PG (ref 26.5–33)
MCHC RBC AUTO-ENTMCNC: 31.9 G/DL (ref 31.5–36.5)
MCV RBC AUTO: 87 FL (ref 78–100)
MONOCYTES # BLD AUTO: 0.5 10E9/L (ref 0–1.3)
MONOCYTES NFR BLD AUTO: 6.8 %
NEUTROPHILS # BLD AUTO: 4.1 10E9/L (ref 1.6–8.3)
NEUTROPHILS NFR BLD AUTO: 57.8 %
NRBC # BLD AUTO: 0 10*3/UL
NRBC BLD AUTO-RTO: 0 /100
PLATELET # BLD AUTO: 265 10E9/L (ref 150–450)
POTASSIUM SERPL-SCNC: 4.1 MMOL/L (ref 3.4–5.3)
PROT SERPL-MCNC: 7.2 G/DL (ref 6.8–8.8)
RBC # BLD AUTO: 4.34 10E12/L (ref 3.8–5.2)
SODIUM SERPL-SCNC: 139 MMOL/L (ref 133–144)
WBC # BLD AUTO: 7.1 10E9/L (ref 4–11)

## 2018-02-07 ASSESSMENT — PAIN SCALES - GENERAL: PAINLEVEL: NO PAIN (0)

## 2018-02-07 NOTE — NURSING NOTE
"Chief Complaint   Patient presents with     Consult     New Consultation       Vitals:    02/07/18 0951   BP: 116/64   BP Location: Left arm   Patient Position: Chair   Cuff Size: Adult Regular   Pulse: 66   Temp: 98.1  F (36.7  C)   SpO2: 100%   Weight: 170 lb 4.8 oz   Height: 5' 3.5\"       Body mass index is 29.69 kg/(m^2).      Jeanne Zapata                       "

## 2018-02-07 NOTE — LETTER
2/7/2018       RE: Mellisa Thompson  [address]   [place] [address]     Dear Colleague,    Thank you for referring your patient, Mellisa Thompson, to the [facility] GASTRO[facility]OLOGY AND IBD CLINIC at [facility]. Please see a copy of my visit note below.    GI CLINIC VISIT    CC/REFERRING MD:  Ree Michele  REASON FOR CONSULTATION:   Ree Michele for   Chief Complaint   Patient presents with     Consult     New Consultation       ASSESSMENT/PLAN:  Ms. Thompson is a 43 yo woman who presents for evaluation of perforated viscus after a recent trip to [place].  Per CT read, she likely had a perforated ileitis, but perforated sigmoid diverticulitis is a possibility as well.  If infectious ileitis, the differential would include Yersinia, cryptosporidium, TB (she has a calcified lung granuloma), campylobacter, Salmonella.  The differential also includes Crohn's disease.  The next step is a colonoscopy to better characterize her bowel.  This should be done 6-8 weeks after her perforation.     # Perforated viscus, likely distal ileum, 1/26/2018  --Stool studies (will most likely be negative, given abx use) for infectious cause  --Blood work today, including Quantiferon test for Tb  --Referral for ileocolonoscopy for end of March/beginning of April to check for signs of inflammation, Crohn's, diverticulosis, mass   --Consider MRE pending the results of the scope   --Complete current abx regimen (levo/flagyl)    # History of H pylori infection, untreated  --Check stool test 4 weeks after completing current antibiotics    # Heartburn  # Epigastric discomfort  May be related to current abx use vs GERD vs. continued inflammation in ileum.   --After H pylori sample is submitted, if symptoms persist, consider starting antacid therapy. Patient will call the GI office.     RTC: Return in about 10 weeks (around 4/18/2018).     A total of 60 minutes, face to face, was spent with  this patient, >50% of which was counseling regarding the above delineated issues.    Naomi Carrillo MD   of Medicine  Division of Gastroenterology, Hepatology and Nutrition  [facility]      HPI  Ms. Thompson is a 41 yo woman who presents for evaluation of perforated viscus noted on CT on 1/26/2018 (most likely ileum vs. sigmoid diverticulitis) s/p conservative management in the hospital.   [facility], she traveled to [place], where she developed abd pain, n/v/diarrhea. She was evaluated at [facility] 1/25/2018 (Dr. Francine Lyon) who noted TTP on exam and ordered a CT scan.  The CT scan showed 5-10 cm of mid-ileitis with free air with neighboring sigmoid inflammation. Differential was perforated ileum (most likely) vs. perforated diverticulitis.     Reports significant improvement of abdominal pain; has some burning discomfort in the epigastric area and continued gas/bloating. Endorses occasional GERD. Has a few days left of the levo/flagyl. She does have a history of H pylori dx on EGD 5 years ago, that was not treated (per patient report; no report available).  Not taking any pain medications, including NSAIDs/ASA.   No fevers or chills. Weight has fluctuated +/- 5 lb. No n/v. No cough.   Has 1-2 BMs per day; no blood.   Has never seen blood in stool.     No family history of IBD. Older sister (53 yo), mat Aunt, mat Uncle all had diverticulitis.   No eye pain, joint pain, skin issues, mouth ulcers.     Immigrated from [facility] [place] [facility] 14 years ago. Was in [place], [place] x 5 years, then moved to [place] to join her brother.   Had brain surgery for epilepsy (partial R temporal lobectomy) in 2007 (currently on Keppra). No history of abdominal surgeries.      ROS:    No fevers or chills  No weight loss  No blurry vision, double vision or change in vision  No sore throat  No lymphadenopathy  No headache, paraesthesias, or weakness in a limb  No shortness of breath or wheezing  No chest pain or  pressure  No arthralgias or myalgias  No rashes or skin changes  No odynophagia or dysphagia  No BRBPR, hematochezia, melena  No dysuria, frequency or urgency  No hot/cold intolerance or polyria  No anxiety or depression    PROBLEM LIST  Patient Active Problem List    Diagnosis Date Noted     Perforated bowel (H) 01/26/2018     Priority: Medium     Major depressive disorder, recurrent episode, mild with seasonal pattern (H) 07/19/2017     Priority: Medium     Seasonal affective disorder (H) 01/25/2017     Priority: Medium     Sinus tachycardia 06/09/2016     Priority: Medium     Adjustment disorder with mixed anxiety and depressed mood 06/09/2016     Priority: Medium     Convulsions (H) 08/29/2012     Priority: Medium     Problem list name updated by automated process. Provider to review         PERTINENT PAST MEDICAL HISTORY:  Past Medical History:   Diagnosis Date     Anxiety      Crohn's disease (H) 01/2018     Depression      Epilepsy (H)        PREVIOUS SURGERIES:  Past Surgical History:   Procedure Laterality Date     ANKLE SURGERY Right 1999     BRAIN SURGERY  2007    right temporal lobectomy      PREVIOUS ENDOSCOPY:  Never had a colonoscopy.   Had an EGD in [place], [place] 5 years ago for heartburn and halitosis. Dx with H pylori. Not treated for unclear reasons.      ALLERGIES:   No Known Allergies    PERTINENT MEDICATIONS:    Current Outpatient Prescriptions:      ciprofloxacin (CIPRO) 500 MG tablet, Take 1 tablet (500 mg) by mouth every 12 hours for 12 days, Disp: 24 tablet, Rfl: 0     metroNIDAZOLE (FLAGYL) 500 MG tablet, Take 1 tablet (500 mg) by mouth every 8 hours for 12 days, Disp: 37 tablet, Rfl: 0     levETIRAcetam 1000 MG TABS, Take 1,000 mg by mouth 2 times daily, Disp: 180 tablet, Rfl: 3     albuterol (VENTOLIN HFA) 108 (90 BASE) MCG/ACT Inhaler, Inhale 2 puffs into the lungs every 6 hours as needed for shortness of breath / dyspnea or wheezing, Disp: 1 Inhaler, Rfl: 0     benzonatate (TESSALON) 100  MG capsule, Take 1 capsule (100 mg) by mouth 3 times daily as needed for cough, Disp: 42 capsule, Rfl: 0     Prenatal Vit-Fe Fumarate-FA (PREPLUS) 27-1 MG TABS, TAKE 1 TABLET BY MOUTH ONCE DAILY, Disp: 90 tablet, Rfl: 3     order for DME, Equipment being ordered: Light box 10,000 Lux for seasonal affective disorder code F39, Disp: 1 Box, Rfl: 0     Cholecalciferol (VITAMIN D) 2000 UNITS tablet, Take 1 tablet by mouth daily Take one tablet daily., Disp: 90 tablet, Rfl: 3     Omega-3 Fatty Acids (OMEGA III EPA+DHA) 1000 MG CAPS, Take one twice a day, Disp: 90 capsule, Rfl: 1     ALPRAZolam (XANAX) 0.25 MG tablet, Take 1 tablet (0.25 mg) by mouth 3 times daily as needed for anxiety, Disp: , Rfl:      triamcinolone (KENALOG) 0.1 % ointment, Apply to affected chest up to twice daily as needed., Disp: 80 g, Rfl: 1     minoxidil 5 % SOLN, Apply topically once daily to entire scalp. ., Disp: 60 mL, Rfl: 0    SOCIAL HISTORY:  Social History     Social History     Marital status:      Spouse name: N/A     Number of children: N/A     Years of education: N/A     Occupational History     admistration      in [employer]      Social History Main Topics     Smoking status: Never Smoker     Smokeless tobacco: Never Used     Alcohol use 0.0 oz/week     0 Standard drinks or equivalent per week      Comment: socially     Drug use: No     Sexual activity: Yes     Partners: Male     Other Topics Concern     Not on file     Social History Narrative    First-       FAMILY HISTORY:  Family History   Problem Relation Age of Onset     Hypertension Mother      CANCER Father      prostate     CANCER Paternal Grandfather      DIABETES Maternal Grandmother      Melanoma No family hx of      Skin Cancer No family hx of        Past/family/social history reviewed and no changes    PHYSICAL EXAMINATION:  Constitutional: aaox3, cooperative, pleasant, not dyspneic/diaphoretic, no acute distress  Vitals reviewed: /64 (BP  "Location: Left arm, Patient Position: Chair, Cuff Size: Adult Regular)  Pulse 66  Temp 98.1  F (36.7  C)  Ht 1.613 m (5' 3.5\")  Wt 77.2 kg (170 lb 4.8 oz)  SpO2 100%  BMI 29.69 kg/m2  Wt:   Wt Readings from Last 2 Encounters:   02/07/18 77.2 kg (170 lb 4.8 oz)   01/27/18 78 kg (171 lb 15.3 oz)      Eyes: Sclera anicteric/injected  Ears/nose/mouth/throat: Normal oropharynx without ulcers or exudate, mucus membranes moist, hearing intact  Neck: supple, thyroid normal size  CV: No edema  Respiratory: Unlabored breathing  Lymph: No axillary, submandibular, supraclavicular or inguinal lymphadenopathy  Abd: soft, nondistended, +bs, no hepatosplenomegaly, mod TTP in epigastric area and LLQ, no peritoneal signs  Skin: warm, perfused, no jaundice  Psych: Normal affect  MSK: Normal gait      PERTINENT STUDIES:    Orders Only on 11/22/2017   Component Date Value Ref Range Status     Vitamin D Deficiency screening 11/22/2017 31  20 - 75 ug/L Final     Sodium 11/22/2017 141  133 - 144 mmol/L Final     Potassium 11/22/2017 4.4  3.4 - 5.3 mmol/L Final     Chloride 11/22/2017 108  94 - 109 mmol/L Final     Carbon Dioxide 11/22/2017 27  20 - 32 mmol/L Final     Anion Gap 11/22/2017 6  3 - 14 mmol/L Final     Glucose 11/22/2017 89  70 - 99 mg/dL Final     Urea Nitrogen 11/22/2017 13  7 - 30 mg/dL Final     Creatinine 11/22/2017 0.65  0.52 - 1.04 mg/dL Final     GFR Estimate 11/22/2017 >90  >60 mL/min/1.7m2 Final     GFR Estimate If Black 11/22/2017 >90  >60 mL/min/1.7m2 Final     Calcium 11/22/2017 9.1  8.5 - 10.1 mg/dL Final     WBC 11/22/2017 6.3  4.0 - 11.0 10e9/L Final     RBC Count 11/22/2017 4.38  3.8 - 5.2 10e12/L Final     Hemoglobin 11/22/2017 12.3  11.7 - 15.7 g/dL Final     Hematocrit 11/22/2017 39.1  35.0 - 47.0 % Final     MCV 11/22/2017 89  78 - 100 fl Final     MCH 11/22/2017 28.1  26.5 - 33.0 pg Final     MCHC 11/22/2017 31.5  31.5 - 36.5 g/dL Final     RDW 11/22/2017 12.5  10.0 - 15.0 % Final     Platelet Count " 11/22/2017 296  150 - 450 10e9/L Final     ALT 11/22/2017 68* 0 - 50 U/L Final     AST 11/22/2017 35  0 - 45 U/L Final     TSH 11/22/2017 0.90  0.40 - 4.00 mU/L Final     Cholesterol 11/22/2017 183  <200 mg/dL Final     Triglycerides 11/22/2017 74  <150 mg/dL Final     HDL Cholesterol 11/22/2017 54  >49 mg/dL Final     LDL Cholesterol Calculated 11/22/2017 114* <100 mg/dL Final     Non HDL Cholesterol 11/22/2017 129  <130 mg/dL Final     Iron 11/22/2017 64  35 - 180 ug/dL Final     Iron Binding Cap 11/22/2017 375  240 - 430 ug/dL Final     Iron Saturation Index 11/22/2017 17  15 - 46 % Final     Ferritin 11/22/2017 18  12 - 150 ng/mL Final           Again, thank you for allowing me to participate in the care of your patient.      Sincerely,    Naomi Carrillo MD

## 2018-02-07 NOTE — MR AVS SNAPSHOT
After Visit Summary   2/7/2018    Mellisa Thompson    MRN: [id number]           Patient Information     Date Of Birth          1975        Visit Information        Provider Department      2/7/2018 10:00 AM Naomi Carrillo MD [facility]        Today's Diagnoses     Perforated bowel (H)    -  1    Abdominal pain, epigastric          Care Instructions    Dear Mellisa,    Thank you for coming in today.  It was a pleasure meeting you. As discussed, the plan will be:    ---Blood work  ---Colonoscopy 6-8 weeks after your perforation (end of Jan) to check for evidence of Crohn's disease, diverticulosis, mass   ---Stool study for infection. Please submit this anytime.   ---Stool study for H pylori. Please submit this 4 weeks after you have completed your current antibiotics.   ---If you have continued burning in your stomach after you submit the H pylori stool sample, then please call the GI clinic and we will prescribe an antacid therapy.     Please return after your colonoscopy.     -Dr. Carrillo          Follow-ups after your visit        Additional Services     GASTROENTEROLOGY ADULT REF PROCEDURE ONLY                 Follow-up notes from your care team     Return in about 10 weeks (around 4/18/2018).      Your next 10 appointments already scheduled     Jun 19, 2018 10:00 AM CDT   (Arrive by 9:45 AM)   Return Seizure with Brody Mohan MD   [facility] Neurology ([facility])    [address] [place] [id number] [phone]              Future tests that were ordered for you today     Open Future Orders        Priority Expected Expires Ordered    H Pylori antigen stool Routine  2/7/2019 2/7/2018    Enteric Bacteria and Virus Panel by RADHA Stool [XUX2553] Routine 2/7/2018 4/8/2018 2/7/2018    CBC with platelets differential [DYF950] Routine 2/7/2018 4/8/2018 2/7/2018    Hepatic panel [LAB20] Routine 2/7/2018 4/8/2018 2/7/2018     CRP inflammation [[id number]] Routine 2018    Erythrocyte sedimentation rate auto [AHR028] Routine 2018    Basic metabolic panel [LAB15] Routine 2018    Ova and Parasite Exam Routine [VOA6784] Routine 2018    Giardia antigen [HIM810] Routine 2018    M Tuberculosis by Quantiferon Routine 2018 3/9/2018 2018    Stool Culture for Campylobacter Routine  2019            Who to contact     Please call your clinic at [phone] to:    Ask questions about your health    Make or cancel appointments    Discuss your medicines    Learn about your test results    Speak to your doctor   If you have compliments or concerns about an experience at your clinic, or if you wish to file a complaint, please contact [facility] Patient Relations at [phone] or email us at [email or web address]         Additional Information About Your Visit        [employer] Information     [employer] is an electronic gateway that provides easy, online access to your medical records. With [employer], you can request a clinic appointment, read your test results, renew a prescription or communicate with your care team.     To sign up for [employer] visit the website at [email or web address]   You will be asked to enter the access code listed below, as well as some personal information. Please follow the directions to create your username and password.     Your access code is: [email or web address]  Expires: 2018  9:18 AM     Your access code will  in 90 days. If you need help or a new code, please contact your [facility] Clinic or call [phone] for assistance.        Care EveryWhere ID     This is your Care EveryWhere ID. This could be used by other organizations to access your [facility] medical records  [id number]        Your Vitals Were     Pulse Temperature Height  "Pulse Oximetry BMI (Body Mass Index)       66 98.1  F (36.7  C) 1.613 m (5' 3.5\") 100% 29.69 kg/m2        Blood Pressure from Last 3 Encounters:   02/07/18 116/64   01/28/18 94/56   11/22/17 111/75    Weight from Last 3 Encounters:   02/07/18 77.2 kg (170 lb 4.8 oz)   01/27/18 78 kg (171 lb 15.3 oz)   11/22/17 79.5 kg (175 lb 4.8 oz)              We Performed the Following     GASTROENTEROLOGY ADULT REF PROCEDURE ONLY        Primary Care Provider Office Phone # Fax #    Ree Michele [phone]       [address] [place] [address]        Equal Access to Services     LUIS M ROSAS : Eleuterio Garnett, wacaterinada luqadaha, qaybta kaalmada cindy, rosio zarate . So [facility] [phone].    ATENCIÓN: Si habla español, tiene a farah disposición servicios gratuitos de asistencia lingüística. Dennis al [phone].    We comply with applicable federal civil rights laws and [place] laws. We do not discriminate on the basis of race, color, national origin, age, disability, sex, sexual orientation, or gender identity.            Thank you!     Thank you for choosing [facility]  for your care. Our goal is always to provide you with excellent care. Hearing back from our patients is one way we can continue to improve our services. Please take a few minutes to complete the written survey that you may receive in the mail after your visit with us. Thank you!             Your Updated Medication List - Protect others around you: Learn how to safely use, store and throw away your medicines at www.disposemymeds.org.          This list is accurate as of 2/7/18 10:48 AM.  Always use your most recent med list.                   Brand Name Dispense Instructions for use Diagnosis    albuterol 108 (90 BASE) MCG/ACT Inhaler    VENTOLIN HFA    1 Inhaler    Inhale 2 puffs into the lungs every 6 hours as needed for shortness of breath / dyspnea or " wheezing    Acute bronchospasm       ALPRAZolam 0.25 MG tablet    XANAX     Take 1 tablet (0.25 mg) by mouth 3 times daily as needed for anxiety        benzonatate 100 MG capsule    TESSALON    42 capsule    Take 1 capsule (100 mg) by mouth 3 times daily as needed for cough    Cough       ciprofloxacin 500 MG tablet    CIPRO    24 tablet    Take 1 tablet (500 mg) by mouth every 12 hours for 12 days    Perforated bowel (H)       levETIRAcetam 1000 MG Tabs     180 tablet    Take 1,000 mg by mouth 2 times daily    Convulsions, unspecified convulsion type (H)       metroNIDAZOLE 500 MG tablet    FLAGYL    37 tablet    Take 1 tablet (500 mg) by mouth every 8 hours for 12 days    Perforated bowel (H)       minoxidil 5 % Soln     60 mL    Apply topically once daily to entire scalp. .    Loss of hair       OMEGA III EPA+DHA 1000 MG Caps     90 capsule    Take one twice a day    Seizure disorder (H)       order for DME     1 Box    Equipment being ordered: Light box 10,000 Lux for seasonal affective disorder code F39    Seasonal affective disorder (H)       PREPLUS 27-1 MG Tabs     90 tablet    TAKE 1 TABLET BY MOUTH ONCE DAILY    Other iron deficiency anemia       triamcinolone 0.1 % ointment    KENALOG    80 g    Apply to affected chest up to twice daily as needed.    Rash       vitamin D 2000 UNITS tablet     90 tablet    Take 1 tablet by mouth daily Take one tablet daily.    Vitamin D deficiency

## 2018-02-07 NOTE — PATIENT INSTRUCTIONS
Dear Mellisa,    Thank you for coming in today.  It was a pleasure meeting you. As discussed, the plan will be:    ---Blood work  ---Colonoscopy 6-8 weeks after your perforation (end of Jan) to check for evidence of Crohn's disease, diverticulosis, mass   ---Stool study for infection. Please submit this anytime.   ---Stool study for H pylori. Please submit this 4 weeks after you have completed your current antibiotics.   ---If you have continued burning in your stomach after you submit the H pylori stool sample, then please call the GI clinic and we will prescribe an antacid therapy.     Please return after your colonoscopy.     -Dr. Carrillo

## 2018-02-07 NOTE — PROGRESS NOTES
GI CLINIC VISIT    CC/REFERRING MD:  Ree Michele  REASON FOR CONSULTATION:   Ree Michele for   Chief Complaint   Patient presents with     Consult     New Consultation       ASSESSMENT/PLAN:  Ms. Thompson is a 41 yo woman who presents for evaluation of perforated viscus after a recent trip to [place].  Per CT read, she likely had a perforated ileitis, but perforated sigmoid diverticulitis is a possibility as well.  If infectious ileitis, the differential would include Yersinia, cryptosporidium, TB (she has a calcified lung granuloma), campylobacter, Salmonella.  The differential also includes Crohn's disease.  The next step is a colonoscopy to better characterize her bowel.  This should be done 6-8 weeks after her perforation.     # Perforated viscus, likely distal ileum, 1/26/2018  --Stool studies (will most likely be negative, given abx use) for infectious cause  --Blood work today, including Quantiferon test for Tb  --Referral for ileocolonoscopy for end of March/beginning of April to check for signs of inflammation, Crohn's, diverticulosis, mass   --Consider MRE pending the results of the scope   --Complete current abx regimen (levo/flagyl)    # History of H pylori infection, untreated  --Check stool test 4 weeks after completing current antibiotics    # Heartburn  # Epigastric discomfort  May be related to current abx use vs GERD vs. continued inflammation in ileum.   --After H pylori sample is submitted, if symptoms persist, consider starting antacid therapy. Patient will call the GI office.     RTC: Return in about 10 weeks (around 4/18/2018).     A total of 60 minutes, face to face, was spent with this patient, >50% of which was counseling regarding the above delineated issues.    Naomi Carrillo MD   of Medicine  Division of Gastroenterology, Hepatology and Nutrition  [facility]      HPI  Ms. Thompson is a 41 yo woman who presents for evaluation of perforated viscus  noted on CT on 1/26/2018 (most likely ileum vs. sigmoid diverticulitis) s/p conservative management in the hospital.   [email or web address] she traveled to [place], where she developed abd pain, n/v/diarrhea. She was evaluated at [facility] 1/25/2018 (Dr. Francine Lyon) who noted TTP on exam and ordered a CT scan.  The CT scan showed 5-10 cm of mid-ileitis with free air with neighboring sigmoid inflammation. Differential was perforated ileum (most likely) vs. perforated diverticulitis.     Reports significant improvement of abdominal pain; has some burning discomfort in the epigastric area and continued gas/bloating. Endorses occasional GERD. Has a few days left of the levo/flagyl. She does have a history of H pylori dx on EGD 5 years ago, that was not treated (per patient report; no report available).  Not taking any pain medications, including NSAIDs/ASA.   No fevers or chills. Weight has fluctuated +/- 5 lb. No n/v. No cough.   Has 1-2 BMs per day; no blood.   Has never seen blood in stool.     No family history of IBD. Older sister (55 yo), mat Aunt, mat Uncle all had diverticulitis.   No eye pain, joint pain, skin issues, mouth ulcers.     Immigrated from [facility] 14 years ago. Was in [place], [place] x 5 years, then moved to [place] to join her brother.   Had brain surgery for epilepsy (partial R temporal lobectomy) in 2007 (currently on Keppra). No history of abdominal surgeries.      ROS:    No fevers or chills  No weight loss  No blurry vision, double vision or change in vision  No sore throat  No lymphadenopathy  No headache, paraesthesias, or weakness in a limb  No shortness of breath or wheezing  No chest pain or pressure  No arthralgias or myalgias  No rashes or skin changes  No odynophagia or dysphagia  No BRBPR, hematochezia, melena  No dysuria, frequency or urgency  No hot/cold intolerance or polyria  No anxiety or depression    PROBLEM LIST  Patient Active Problem List    Diagnosis Date Noted     Perforated  bowel (H) 01/26/2018     Priority: Medium     Major depressive disorder, recurrent episode, mild with seasonal pattern (H) 07/19/2017     Priority: Medium     Seasonal affective disorder (H) 01/25/2017     Priority: Medium     Sinus tachycardia 06/09/2016     Priority: Medium     Adjustment disorder with mixed anxiety and depressed mood 06/09/2016     Priority: Medium     Convulsions (H) 08/29/2012     Priority: Medium     Problem list name updated by automated process. Provider to review         PERTINENT PAST MEDICAL HISTORY:  Past Medical History:   Diagnosis Date     Anxiety      Crohn's disease (H) 01/2018     Depression      Epilepsy (H)        PREVIOUS SURGERIES:  Past Surgical History:   Procedure Laterality Date     ANKLE SURGERY Right 1999     BRAIN SURGERY  2007    right temporal lobectomy      PREVIOUS ENDOSCOPY:  Never had a colonoscopy.   Had an EGD in [place], [place] 5 years ago for heartburn and halitosis. Dx with H pylori. Not treated for unclear reasons.      ALLERGIES:   No Known Allergies    PERTINENT MEDICATIONS:    Current Outpatient Prescriptions:      ciprofloxacin (CIPRO) 500 MG tablet, Take 1 tablet (500 mg) by mouth every 12 hours for 12 days, Disp: 24 tablet, Rfl: 0     metroNIDAZOLE (FLAGYL) 500 MG tablet, Take 1 tablet (500 mg) by mouth every 8 hours for 12 days, Disp: 37 tablet, Rfl: 0     levETIRAcetam 1000 MG TABS, Take 1,000 mg by mouth 2 times daily, Disp: 180 tablet, Rfl: 3     albuterol (VENTOLIN HFA) 108 (90 BASE) MCG/ACT Inhaler, Inhale 2 puffs into the lungs every 6 hours as needed for shortness of breath / dyspnea or wheezing, Disp: 1 Inhaler, Rfl: 0     benzonatate (TESSALON) 100 MG capsule, Take 1 capsule (100 mg) by mouth 3 times daily as needed for cough, Disp: 42 capsule, Rfl: 0     Prenatal Vit-Fe Fumarate-FA (PREPLUS) 27-1 MG TABS, TAKE 1 TABLET BY MOUTH ONCE DAILY, Disp: 90 tablet, Rfl: 3     order for DME, Equipment being ordered: Light box 10,000 Lux for seasonal  "affective disorder code F39, Disp: 1 Box, Rfl: 0     Cholecalciferol (VITAMIN D) 2000 UNITS tablet, Take 1 tablet by mouth daily Take one tablet daily., Disp: 90 tablet, Rfl: 3     Omega-3 Fatty Acids (OMEGA III EPA+DHA) 1000 MG CAPS, Take one twice a day, Disp: 90 capsule, Rfl: 1     ALPRAZolam (XANAX) 0.25 MG tablet, Take 1 tablet (0.25 mg) by mouth 3 times daily as needed for anxiety, Disp: , Rfl:      triamcinolone (KENALOG) 0.1 % ointment, Apply to affected chest up to twice daily as needed., Disp: 80 g, Rfl: 1     minoxidil 5 % SOLN, Apply topically once daily to entire scalp. 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      Social History Main Topics     Smoking status: Never Smoker     Smokeless tobacco: Never Used     Alcohol use 0.0 oz/week     0 Standard drinks or equivalent per week      Comment: socially     Drug use: No     Sexual activity: Yes     Partners: Male     Other Topics Concern     Not on file     Social History Narrative    First-       FAMILY HISTORY:  Family History   Problem Relation Age of Onset     Hypertension Mother      CANCER Father      prostate     CANCER Paternal Grandfather      DIABETES Maternal Grandmother      Melanoma No family hx of      Skin Cancer No family hx of        Past/family/social history reviewed and no changes    PHYSICAL EXAMINATION:  Constitutional: aaox3, cooperative, pleasant, not dyspneic/diaphoretic, no acute distress  Vitals reviewed: /64 (BP Location: Left arm, Patient Position: Chair, Cuff Size: Adult Regular)  Pulse 66  Temp 98.1  F (36.7  C)  Ht 1.613 m (5' 3.5\")  Wt 77.2 kg (170 lb 4.8 oz)  SpO2 100%  BMI 29.69 kg/m2  Wt:   Wt Readings from Last 2 Encounters:   02/07/18 77.2 kg (170 lb 4.8 oz)   01/27/18 78 kg (171 lb 15.3 " oz)      Eyes: Sclera anicteric/injected  Ears/nose/mouth/throat: Normal oropharynx without ulcers or exudate, mucus membranes moist, hearing intact  Neck: supple, thyroid normal size  CV: No edema  Respiratory: Unlabored breathing  Lymph: No axillary, submandibular, supraclavicular or inguinal lymphadenopathy  Abd: soft, nondistended, +bs, no hepatosplenomegaly, mod TTP in epigastric area and LLQ, no peritoneal signs  Skin: warm, perfused, no jaundice  Psych: Normal affect  MSK: Normal gait      PERTINENT STUDIES:    Orders Only on 11/22/2017   Component Date Value Ref Range Status     Vitamin D Deficiency screening 11/22/2017 31  20 - 75 ug/L Final     Sodium 11/22/2017 141  133 - 144 mmol/L Final     Potassium 11/22/2017 4.4  3.4 - 5.3 mmol/L Final     Chloride 11/22/2017 108  94 - 109 mmol/L Final     Carbon Dioxide 11/22/2017 27  20 - 32 mmol/L Final     Anion Gap 11/22/2017 6  3 - 14 mmol/L Final     Glucose 11/22/2017 89  70 - 99 mg/dL Final     Urea Nitrogen 11/22/2017 13  7 - 30 mg/dL Final     Creatinine 11/22/2017 0.65  0.52 - 1.04 mg/dL Final     GFR Estimate 11/22/2017 >90  >60 mL/min/1.7m2 Final     GFR Estimate If Black 11/22/2017 >90  >60 mL/min/1.7m2 Final     Calcium 11/22/2017 9.1  8.5 - 10.1 mg/dL Final     WBC 11/22/2017 6.3  4.0 - 11.0 10e9/L Final     RBC Count 11/22/2017 4.38  3.8 - 5.2 10e12/L Final     Hemoglobin 11/22/2017 12.3  11.7 - 15.7 g/dL Final     Hematocrit 11/22/2017 39.1  35.0 - 47.0 % Final     MCV 11/22/2017 89  78 - 100 fl Final     MCH 11/22/2017 28.1  26.5 - 33.0 pg Final     MCHC 11/22/2017 31.5  31.5 - 36.5 g/dL Final     RDW 11/22/2017 12.5  10.0 - 15.0 % Final     Platelet Count 11/22/2017 296  150 - 450 10e9/L Final     ALT 11/22/2017 68* 0 - 50 U/L Final     AST 11/22/2017 35  0 - 45 U/L Final     TSH 11/22/2017 0.90  0.40 - 4.00 mU/L Final     Cholesterol 11/22/2017 183  <200 mg/dL Final     Triglycerides 11/22/2017 74  <150 mg/dL Final     HDL Cholesterol 11/22/2017  54  >49 mg/dL Final     LDL Cholesterol Calculated 11/22/2017 114* <100 mg/dL Final     Non HDL Cholesterol 11/22/2017 129  <130 mg/dL Final     Iron 11/22/2017 64  35 - 180 ug/dL Final     Iron Binding Cap 11/22/2017 375  240 - 430 ug/dL Final     Iron Saturation Index 11/22/2017 17  15 - 46 % Final     Ferritin 11/22/2017 18  12 - 150 ng/mL Final

## 2018-02-08 LAB
M TB TUBERC IFN-G BLD QL: ABNORMAL
M TB TUBERC IFN-G/MITOGEN IGNF BLD: 1.08 IU/ML

## 2018-02-12 ENCOUNTER — CARE COORDINATION (OUTPATIENT)
Dept: GASTROENTEROLOGY | Facility: CLINIC | Age: 43
End: 2018-02-12

## 2018-02-12 DIAGNOSIS — R76.12 POSITIVE QUANTIFERON-TB GOLD TEST: Primary | ICD-10-CM

## 2018-02-12 NOTE — PROGRESS NOTES
Called patient to discuss lab results and need for ID appointment.     ID appointment scheduled for 3/13 at 330.Left voicemail with clinic contact information.

## 2018-02-15 ENCOUNTER — OFFICE VISIT (OUTPATIENT)
Dept: INFECTIOUS DISEASES | Facility: CLINIC | Age: 43
End: 2018-02-15
Attending: INTERNAL MEDICINE
Payer: COMMERCIAL

## 2018-02-15 VITALS
HEART RATE: 60 BPM | HEIGHT: 64 IN | TEMPERATURE: 99.7 F | BODY MASS INDEX: 28.75 KG/M2 | DIASTOLIC BLOOD PRESSURE: 69 MMHG | WEIGHT: 168.4 LBS | SYSTOLIC BLOOD PRESSURE: 117 MMHG

## 2018-02-15 DIAGNOSIS — K63.1 PERFORATED BOWEL (H): Primary | ICD-10-CM

## 2018-02-15 DIAGNOSIS — R76.12 POSITIVE QUANTIFERON-TB GOLD TEST: ICD-10-CM

## 2018-02-15 DIAGNOSIS — L29.9 CHRONIC PRURITUS: ICD-10-CM

## 2018-02-15 PROCEDURE — G0463 HOSPITAL OUTPT CLINIC VISIT: HCPCS | Mod: ZF

## 2018-02-15 ASSESSMENT — PAIN SCALES - GENERAL: PAINLEVEL: NO PAIN (0)

## 2018-02-15 NOTE — MR AVS SNAPSHOT
After Visit Summary   2/15/2018    Mellisa Thompson    MRN: [id number]           Patient Information     Date Of Birth          1975        Visit Information        Provider Department      2/15/2018 10:30 AM Tiarra Reinoso MD [facility] and Infectious Diseases         Follow-ups after your visit        Your next 10 appointments already scheduled     Apr 02, 2018   Procedure with Naomi Carrillo MD   [facility] ([facility])    [address] [place] [address]  5th [facility] [place] [address] [phone]           Located in the Clinics and Surgery Center at [address] [place] [address].   parking is very convenient and highly recommended.  is a $6 flat rate fee.  Both  and self parkers should enter the main arrival plaza from [facility]; parking attendants will direct you based on your parking preference.            Jun 19, 2018 10:00 AM CDT   (Arrive by 9:45 AM)   Return Seizure with Brody Mohan MD   [facility] ([facility])    [address] [place] [address] [phone]              Who to contact     If you have questions or need follow up information about today's clinic visit or your schedule please contact [facility] AND INFECTIOUS DISEASES directly at [phone].  Normal or non-critical lab and imaging results will be communicated to you by MyChart, letter or phone within 4 business days after the clinic has received the results. If you do not hear from us within 7 days, please contact the clinic through MyChart or phone. If you have a critical or abnormal lab result, we will notify you by phone as soon as possible.  Submit refill requests through [employer] or call your pharmacy and they will forward the refill request to us. Please allow 3 business days for your refill to be completed.          Additional Information  "About Your Visit        [employer] Information     [employer] lets you send messages to your doctor, view your test results, renew your prescriptions, schedule appointments and more. To sign up, go to [email or web address] . Click on \"Log in\" on the left side of the screen, which will take you to the Welcome page. Then click on \"Sign up Now\" on the right side of the page.     You will be asked to enter the access code listed below, as well as some personal information. Please follow the directions to create your username and password.     Your access code is: [email or web address]  Expires: 2018  9:18 AM     Your access code will  in 90 days. If you need help or a new code, please call your [facility] clinic or [phone].        Care EveryWhere ID     This is your Care EveryWhere ID. This could be used by other organizations to access your [facility] medical records  [id number]        Your Vitals Were     Pulse Temperature Height BMI (Body Mass Index)          60 99.7  F (37.6  C) (Oral) 1.626 m (5' 4\") 28.91 kg/m2         Blood Pressure from Last 3 Encounters:   02/15/18 117/69   18 116/64   18 94/56    Weight from Last 3 Encounters:   02/15/18 76.4 kg (168 lb 6.4 oz)   18 77.2 kg (170 lb 4.8 oz)   18 78 kg (171 lb 15.3 oz)              Today, you had the following     No orders found for display         Today's Medication Changes          These changes are accurate as of 2/15/18 11:23 AM.  If you have any questions, ask your nurse or doctor.               Stop taking these medicines if you haven't already. Please contact your care team if you have questions.     albuterol 108 (90 BASE) MCG/ACT Inhaler   Commonly known as:  VENTOLIN HFA   Stopped by:  Tiarra Reinoso MD           ALPRAZolam 0.25 MG tablet   Commonly known as:  XANAX   Stopped by:  Tiarra Reinoso MD           benzonatate 100 MG capsule   Commonly known as:  TESSALON   Stopped by:  Tiarra Reinoso MD           " minoxidil 5 % Soln   Stopped by:  Tiarra Reinoso MD           OMEGA III EPA+DHA 1000 MG Caps   Stopped by:  Tiarra Reinoso MD           order for DME   Stopped by:  Tiarra Reinoso MD           PREPLUS 27-1 MG Tabs   Stopped by:  Tiarra Reinoso MD           triamcinolone 0.1 % ointment   Commonly known as:  KENALOG   Stopped by:  Tiarra Reinoso MD                    Primary Care Provider Office Phone # Fax #    Ree Michele [phone]       [address] [facility] [address]  [facility] [address]        Equal Access to Services     [facility]: Hadii satish hoover hadliamo Sobart, waaxda luqadaha, qaybta kaalmada ademontezyada, rosio zarate . So [facility] [phone].    ATENCIÓN: Si habla español, tiene a farah disposición servicios gratuitos de asistencia lingüística. Llame al [phone].    We comply with applicable federal civil rights laws and [place] laws. We do not discriminate on the basis of race, color, national origin, age, disability, sex, sexual orientation, or gender identity.            Thank you!     Thank you for choosing [facility] AND INFECTIOUS DISEASES  for your care. Our goal is always to provide you with excellent care. Hearing back from our patients is one way we can continue to improve our services. Please take a few minutes to complete the written survey that you may receive in the mail after your visit with us. Thank you!             Your Updated Medication List - Protect others around you: Learn how to safely use, store and throw away your medicines at www.disposemymeds.org.          This list is accurate as of 2/15/18 11:23 AM.  Always use your most recent med list.                   Brand Name Dispense Instructions for use Diagnosis    levETIRAcetam 1000 MG Tabs     180 tablet    Take 1,000 mg by mouth 2 times daily    Convulsions, unspecified convulsion type (H)       vitamin D 2000 UNITS tablet     90 tablet    Take 1 tablet  by mouth daily Take one tablet daily.    Vitamin D deficiency

## 2018-02-15 NOTE — LETTER
2/15/2018       RE: Mellisa Thompson  [address]   [place] [address]     Dear Colleague,    Thank you for referring your patient, Mellisa Thompson, to the [facility] AND INFECTIOUS DISEASES at [facility]. Please see a copy of my visit note below.    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 Pozo of GI due to concern for positive QuantiFERON gold in the setting of recent ileal perforation.  Patient reports that she first noticed some abdominal pain last October and mentioned it to her gynecologist Dr. Michele.  She thought it could be related to her menstrual cycle and did not think too much of it.  Then, she took a trip to [facility] from December 23 to early January.  In the last few days of her trip she developed excruciating abdominal pain, nonbloody diarrhea, and vomiting.  She saw a local provider who reported decreased bowel sounds.  She improved over the next few days and was able to travel back to the [place].  During her trip, she drank bottled water but was staying in the family home and ate local food.      Then, in later January she had ongoing abdominal discomfort that was keeping her from lying on her stomach, and sometimes even from walking normally.  She had a CT scan done at a local clinic and was noted to have free air.  She was admitted to [facility] and managed conservatively.  She was started on levofloxacin and metronidazole and discharged a few days later with plans to do a colonoscopy on April 2, 2018 after she recovered from her presumed luminal perforation.  She saw GI and had stool studies ordered with instructions to submit a stool sample after she been off antibiotics for 4 weeks.  She finished antibiotics a few  days ago.  Almost immediately she had one episode of bloating and abdominal pain which resolved quickly and has not returned for the last several days.  She also had a QuantiFERON gold completed by  which returned as low positive.  This prompted her referral today. Ms. Thompson reports that growing up as a child she had a neighbor who had tuberculosis.  She had a PPD placed upon immigration which was positive. She reports completing 9 months of therapy for latent tuberculosis in 2005 in [facility].    She also notes that for the past several years, since living in [facility] [facility], that she has had intermittent episodes of full body pruritus. This is exacerbated by sweating or taking hot shower after which she reports diffuse erythema over her chest shoulders and back.  She has never received a specific diagnosis for the symptoms and is concerned that they could be related.    ROS: Complete 12-point ROS is negative except as noted above.    Past Medical History:  Past Medical History:   Diagnosis Date     Anxiety      Crohn's disease (H) 01/2018     Depression      Epilepsy (H)        Past Surgical History:  Past Surgical History:   Procedure Laterality Date     ANKLE SURGERY Right 1999     BRAIN SURGERY  2007    right temporal lobectomy        Social History:  The patient was born in [facility] and received the BCG vaccine as a child.  She predominately lived in cities  but traveled locally including rural areas.  She moved to [place] in 2005.  She did have positive PPD upon arrival.  See HPI for details.  Since arriving she has visited some [facility] [place] and had the trip to [place] as noted above.  No other international travel.  She likes to walk outside within the city limits and has had a few trips to the [place] including some light hiking.  She previously worked as a  but now works mostly in administration.  She does note that she is around many families and children with  international backgrounds.  No pets.  She aquires her food from local grocery stores and drinks city water.  She does not consume meat from hunters or amateur butchers.  As far as she knows she has not consumed well water in her adult life.  No raw milk.  She thinks she may have had an HIV screen many years ago but is not sure and has not had one recently.    Family Medical History:  Family History   Problem Relation Age of Onset     Hypertension Mother      CANCER Father      prostate     CANCER Paternal Grandfather      DIABETES Maternal Grandmother      Melanoma No family hx of      Skin Cancer No family hx of        Allergies:   No Known Allergies    Medications:  Current Outpatient Prescriptions   Medication Sig Dispense Refill     levETIRAcetam 1000 MG TABS Take 1,000 mg by mouth 2 times daily 180 tablet 3     albuterol (VENTOLIN HFA) 108 (90 BASE) MCG/ACT Inhaler Inhale 2 puffs into the lungs every 6 hours as needed for shortness of breath / dyspnea or wheezing 1 Inhaler 0     benzonatate (TESSALON) 100 MG capsule Take 1 capsule (100 mg) by mouth 3 times daily as needed for cough 42 capsule 0     Prenatal Vit-Fe Fumarate-FA (PREPLUS) 27-1 MG TABS TAKE 1 TABLET BY MOUTH ONCE DAILY 90 tablet 3     order for DME Equipment being ordered: Light box 10,000 Lux for seasonal affective disorder code F39 1 Box 0     Cholecalciferol (VITAMIN D) 2000 UNITS tablet Take 1 tablet by mouth daily Take one tablet daily. 90 tablet 3     Omega-3 Fatty Acids (OMEGA III EPA+DHA) 1000 MG CAPS Take one twice a day 90 capsule 1     ALPRAZolam (XANAX) 0.25 MG tablet Take 1 tablet (0.25 mg) by mouth 3 times daily as needed for anxiety       triamcinolone (KENALOG) 0.1 % ointment Apply to affected chest up to twice daily as needed. 80 g 1     minoxidil 5 % SOLN Apply topically once daily to entire scalp. . 60 mL 0       Immunizations:  Immunization History   Administered Date(s) Administered     MMR 05/03/2006     Tdap (Adacel,Boostrix)  "05/09/2011     Varicella 05/03/2006       Exam:  /69  Pulse 60  Temp 99.7  F (37.6  C) (Oral)  Ht 1.626 m (5' 4\")  Wt 76.4 kg (168 lb 6.4 oz)  BMI 28.91 kg/m2   Gen: Alert and in no distress.   Psych: Normal affect. Alert and oriented.   HEENT: PERRL. No icterus. Oropharynx pink and moist without lesions.   Neck: No lymphadenopathy.   CV: Regular rate and rhythm without m/r/g.   Chest: Clear to auscultation bilaterally without wheezes or crackles.   Abdomen: Soft, non-distended. Non-tender. Normal bowel sounds.   Extremities: Warm and well perfused.   Skin: No rashes or lesions noted.     Labs:  WBC   Date Value Ref Range Status   02/07/2018 7.1 4.0 - 11.0 10e9/L Final       CRP Inflammation   Date Value Ref Range Status   02/07/2018 <2.9 0.0 - 8.0 mg/L Final   01/26/2018 10.0 (H) 0.0 - 8.0 mg/L Final       Creatinine   Date Value Ref Range Status   02/07/2018 0.65 0.52 - 1.04 mg/dL Final   01/27/2018 0.72 0.52 - 1.04 mg/dL Final   01/26/2018 0.67 0.52 - 1.04 mg/dL Final       Assessment and Plan:  Mellisa Thompson is a 42 year old female who had a recent presumed ileal perforation of unclear etiology.  She was referred to me due to a positive QuantiFERON gold since tuberculosis can cause this.  She has a history of fully treated latent tuberculosis which means that her PPD and QuantiFERON will be positive for life.  It does not sound as if she had extensive risk factors for the re-acquisition of TB after treatment.  While theoretically she could have acquired it in [place], it sounds as if her symptoms actually started prior to her trip.  If she has any abnormal areas noted on colonoscopy, biopsies could be sent for AFB smear and culture but the pretest probability is relatively low given her history. Regarding other possible infectious etiologies of her perforation, we should assess for Strongyloides, Schistosomiasis, and endemic fungi in addition to the testing already ordered by GI. Would also send any " abnormal areas from colonoscopy for fungal culture since endemic fungi can affect the gut.    Plan:   1. Low concern for active extrapulmonary tuberculosis as the etiology of bowel perforation in setting of fully treated LTBI  2. Add cryptosporidium to stool studies  3. Check HIV (routine screening recommended for all adults - discussed with patient)  4. Check histoplasma urine and serum ag and fungal ab panel as well as schistosomiasis and strongyloides serologies  5. Please send any abnormal areas seen on colonoscopy for AFB stain and cultures as well as fungal culture. Would also alert pathology to the question of an infectious etiology so they can do special stains.     Return to clinic as needed based on lab results.     Tiarra Reinoso MD  Infectious Diseases  [phone]

## 2018-02-15 NOTE — NURSING NOTE
"Chief Complaint   Patient presents with     Consult     consult with positive quant gold test, tzimmer cma       Initial /69  Pulse 60  Temp 99.7  F (37.6  C) (Oral)  Ht 1.626 m (5' 4\")  Wt 76.4 kg (168 lb 6.4 oz)  BMI 28.91 kg/m2 Estimated body mass index is 28.91 kg/(m^2) as calculated from the following:    Height as of this encounter: 1.626 m (5' 4\").    Weight as of this encounter: 76.4 kg (168 lb 6.4 oz).  Medication Reconciliation: complete    "

## 2018-02-15 NOTE — PROGRESS NOTES
[facility]  New Patient Visit  2/15/2018     Chief Complaint: Positive QuantiFERON gold in the setting of recent ileal perforation    HPI:  Mellisa Thompson is a 42 year old female who was referred to me by Dr. Pozo of GI due to concern for positive QuantiFERON gold in the setting of recent ileal perforation.  Patient reports that she first noticed some abdominal pain last October and mentioned it to her gynecologist Dr. Michele.  She thought it could be related to her menstrual cycle and did not think too much of it.  Then, she took a trip to [facility] from December 23 to early January.  In the last few days of her trip she developed excruciating abdominal pain, nonbloody diarrhea, and vomiting.  She saw a local provider who reported decreased bowel sounds.  She improved over the next few days and was able to travel back [place] the [place]  During her trip, she drank bottled water but was staying in the family home and ate local food.      Then, in later January she had ongoing abdominal discomfort that was keeping her from lying on her stomach, and sometimes even from walking normally.  She had a CT scan done at a local clinic and was noted to have free air.  She was admitted to [facility] and managed conservatively.  She was started on levofloxacin and metronidazole and discharged a few days later with plans to do a colonoscopy on April 2, 2018 after she recovered from her presumed luminal perforation.  She saw GI and had stool studies ordered with instructions to submit a stool sample after she been off antibiotics for 4 weeks.  She finished antibiotics a few days ago.  Almost immediately she had one episode of bloating and abdominal pain which resolved quickly and has not returned for the last several days.  She also had a QuantiFERON gold completed by GI which returned as low positive.  This prompted her referral today. Ms. Thompson reports that growing up as a child  she had a neighbor who had tuberculosis.  She had a PPD placed upon immigration which was positive. She reports completing 9 months of therapy for latent tuberculosis in 2005 in [facility].    She also notes that for the past several years, since living in [facility], that she has had intermittent episodes of full body pruritus. This is exacerbated by sweating or taking hot shower after which she reports diffuse erythema over her chest shoulders and back.  She has never received a specific diagnosis for the symptoms and is concerned that they could be related.    ROS: Complete 12-point ROS is negative except as noted above.    Past Medical History:  Past Medical History:   Diagnosis Date     Anxiety      Crohn's disease (H) 01/2018     Depression      Epilepsy (H)        Past Surgical History:  Past Surgical History:   Procedure Laterality Date     ANKLE SURGERY Right 1999     BRAIN SURGERY  2007    right temporal lobectomy        Social History:  The patient was born in [facility] and received the BCG vaccine as a child.  She predominately lived in cities  but traveled locally including rural areas.  She moved to [place] in 2005.  She did have positive PPD upon arrival.  See HPI for details.  Since arriving she has visited some [facility] islands and had the trip to [place] as noted above.  No other international travel.  She likes to walk outside within the city limits and has had a few trips to the [place] including some light hiking.  She previously worked as a  but now works mostly in administration.  She does note that she is around many families and children with international backgrounds.  No pets.  She aquires her food from local grocery stores and drinks city water.  She does not consume meat from hunters or amateur butchers.  As far as she knows she has not consumed well water in her adult life.  No raw milk.  She thinks she may have had an HIV screen many years ago but  "is not sure and has not had one recently.    Family Medical History:  Family History   Problem Relation Age of Onset     Hypertension Mother      CANCER Father      prostate     CANCER Paternal Grandfather      DIABETES Maternal Grandmother      Melanoma No family hx of      Skin Cancer No family hx of        Allergies:   No Known Allergies    Medications:  Current Outpatient Prescriptions   Medication Sig Dispense Refill     levETIRAcetam 1000 MG TABS Take 1,000 mg by mouth 2 times daily 180 tablet 3     albuterol (VENTOLIN HFA) 108 (90 BASE) MCG/ACT Inhaler Inhale 2 puffs into the lungs every 6 hours as needed for shortness of breath / dyspnea or wheezing 1 Inhaler 0     benzonatate (TESSALON) 100 MG capsule Take 1 capsule (100 mg) by mouth 3 times daily as needed for cough 42 capsule 0     Prenatal Vit-Fe Fumarate-FA (PREPLUS) 27-1 MG TABS TAKE 1 TABLET BY MOUTH ONCE DAILY 90 tablet 3     order for DME Equipment being ordered: Light box 10,000 Lux for seasonal affective disorder code F39 1 Box 0     Cholecalciferol (VITAMIN D) 2000 UNITS tablet Take 1 tablet by mouth daily Take one tablet daily. 90 tablet 3     Omega-3 Fatty Acids (OMEGA III EPA+DHA) 1000 MG CAPS Take one twice a day 90 capsule 1     ALPRAZolam (XANAX) 0.25 MG tablet Take 1 tablet (0.25 mg) by mouth 3 times daily as needed for anxiety       triamcinolone (KENALOG) 0.1 % ointment Apply to affected chest up to twice daily as needed. 80 g 1     minoxidil 5 % SOLN Apply topically once daily to entire scalp. . 60 mL 0       Immunizations:  Immunization History   Administered Date(s) Administered     MMR 05/03/2006     Tdap (Adacel,Boostrix) 05/09/2011     Varicella 05/03/2006       Exam:  /69  Pulse 60  Temp 99.7  F (37.6  C) (Oral)  Ht 1.626 m (5' 4\")  Wt 76.4 kg (168 lb 6.4 oz)  BMI 28.91 kg/m2   Gen: Alert and in no distress.   Psych: Normal affect. Alert and oriented.   HEENT: PERRL. No icterus. Oropharynx pink and moist without " lesions.   Neck: No lymphadenopathy.   CV: Regular rate and rhythm without m/r/g.   Chest: Clear to auscultation bilaterally without wheezes or crackles.   Abdomen: Soft, non-distended. Non-tender. Normal bowel sounds.   Extremities: Warm and well perfused.   Skin: No rashes or lesions noted.     Labs:  WBC   Date Value Ref Range Status   02/07/2018 7.1 4.0 - 11.0 10e9/L Final       CRP Inflammation   Date Value Ref Range Status   02/07/2018 <2.9 0.0 - 8.0 mg/L Final   01/26/2018 10.0 (H) 0.0 - 8.0 mg/L Final       Creatinine   Date Value Ref Range Status   02/07/2018 0.65 0.52 - 1.04 mg/dL Final   01/27/2018 0.72 0.52 - 1.04 mg/dL Final   01/26/2018 0.67 0.52 - 1.04 mg/dL Final       Assessment and Plan:  Mellisa Thompson is a 42 year old female who had a recent presumed ileal perforation of unclear etiology.  She was referred to me due to a positive QuantiFERON gold since tuberculosis can cause this.  She has a history of fully treated latent tuberculosis which means that her PPD and QuantiFERON will be positive for life.  It does not sound as if she had extensive risk factors for the re-acquisition of TB after treatment.  While theoretically she could have acquired it in [place], it sounds as if her symptoms actually started prior to her trip.  If she has any abnormal areas noted on colonoscopy, biopsies could be sent for AFB smear and culture but the pretest probability is relatively low given her history. Regarding other possible infectious etiologies of her perforation, we should assess for Strongyloides, Schistosomiasis, and endemic fungi in addition to the testing already ordered by GI. Would also send any abnormal areas from colonoscopy for fungal culture since endemic fungi can affect the gut.    Plan:   1. Low concern for active extrapulmonary tuberculosis as the etiology of bowel perforation in setting of fully treated LTBI  2. Add cryptosporidium to stool studies  3. Check HIV (routine screening recommended  for all adults - discussed with patient)  4. Check histoplasma urine and serum ag and fungal ab panel as well as schistosomiasis and strongyloides serologies  5. Please send any abnormal areas seen on colonoscopy for AFB stain and cultures as well as fungal culture. Would also alert pathology to the question of an infectious etiology so they can do special stains.     Return to clinic as needed based on lab results.     Tiarra Reinoso MD  Infectious Diseases  [phone]

## 2018-02-26 ENCOUNTER — TELEPHONE (OUTPATIENT)
Dept: INFECTIOUS DISEASES | Facility: CLINIC | Age: 43
End: 2018-02-26

## 2018-02-26 NOTE — TELEPHONE ENCOUNTER
Per requesting from Dr. Reinoso, called pt and LVM to let her know that Dr. Reinoso has ordered all labs that she needs for the pt and that she can get these done in a couple weeks when she drops off stool samples. Left her clinic phone number if she has questions.  Leighann Rodríguez RN

## 2018-03-07 DIAGNOSIS — R10.13 ABDOMINAL PAIN, EPIGASTRIC: ICD-10-CM

## 2018-03-07 DIAGNOSIS — K63.1 PERFORATED BOWEL (H): ICD-10-CM

## 2018-03-07 PROCEDURE — 86682 HELMINTH ANTIBODY: CPT | Mod: 90 | Performed by: FAMILY MEDICINE

## 2018-03-07 PROCEDURE — 99000 SPECIMEN HANDLING OFFICE-LAB: CPT | Performed by: FAMILY MEDICINE

## 2018-03-07 PROCEDURE — 87338 HPYLORI STOOL AG IA: CPT | Performed by: FAMILY MEDICINE

## 2018-03-07 PROCEDURE — 86612 BLASTOMYCES ANTIBODY: CPT | Mod: 90 | Performed by: FAMILY MEDICINE

## 2018-03-07 PROCEDURE — 87206 SMEAR FLUORESCENT/ACID STAI: CPT | Performed by: FAMILY MEDICINE

## 2018-03-07 PROCEDURE — 87385 HISTOPLASMA CAPSUL AG IA: CPT | Mod: 90 | Performed by: FAMILY MEDICINE

## 2018-03-07 PROCEDURE — 86635 COCCIDIOIDES ANTIBODY: CPT | Mod: 90 | Performed by: FAMILY MEDICINE

## 2018-03-07 PROCEDURE — 87177 OVA AND PARASITES SMEARS: CPT | Performed by: FAMILY MEDICINE

## 2018-03-07 PROCEDURE — 87209 SMEAR COMPLEX STAIN: CPT | Performed by: FAMILY MEDICINE

## 2018-03-07 PROCEDURE — 87389 HIV-1 AG W/HIV-1&-2 AB AG IA: CPT | Performed by: FAMILY MEDICINE

## 2018-03-07 PROCEDURE — 87506 IADNA-DNA/RNA PROBE TQ 6-11: CPT | Performed by: FAMILY MEDICINE

## 2018-03-07 PROCEDURE — 86698 HISTOPLASMA ANTIBODY: CPT | Mod: 90 | Performed by: FAMILY MEDICINE

## 2018-03-07 PROCEDURE — 86606 ASPERGILLUS ANTIBODY: CPT | Mod: 90 | Performed by: FAMILY MEDICINE

## 2018-03-07 PROCEDURE — 36415 COLL VENOUS BLD VENIPUNCTURE: CPT | Performed by: FAMILY MEDICINE

## 2018-03-09 LAB
G LAMBLIA AG STL QL IA: NORMAL
H PYLORI AG STL QL IA: NORMAL
HIV 1+2 AB+HIV1 P24 AG SERPL QL IA: NONREACTIVE
O+P STL MICRO: NORMAL
O+P STL MICRO: NORMAL
SPECIMEN SOURCE: NORMAL

## 2018-03-10 LAB
O+P STL CONC: NORMAL
SPECIMEN SOURCE: NORMAL
STRONGYLOIDES IGG SER IA-ACNC: 0.03 IV

## 2018-03-12 LAB
ASPERGILLUS AB SER QL ID: NORMAL
B DERMAT AB SER QL ID: NORMAL
COCCIDIOIDES AB SPEC QL ID: NORMAL
H CAPSUL AB TITR SER ID: NORMAL {TITER}

## 2018-03-14 LAB
LAB SCANNED RESULT: NORMAL
LAB SCANNED RESULT: NORMAL

## 2018-03-16 ENCOUNTER — TELEPHONE (OUTPATIENT)
Dept: INFECTIOUS DISEASES | Facility: CLINIC | Age: 43
End: 2018-03-16

## 2018-03-16 NOTE — TELEPHONE ENCOUNTER
Infectious Diseases Telephone Note:   3/16/2018     I spoke with Mellisa today to let her know that all the lab testing she had done on 3/7 was negative, except that the Schistosoma ab is still pending (very unlikely that it will be positive). She asked if we could send her results in the mail and I am happy to do that. She has no further questions and can follow-up with ID in the future as needed.     Tiarra Reinoso MD  Infectious Diseases  [phone]

## 2018-03-19 LAB — SCHISTOSOMA IGG SER IA-ACNC: NORMAL

## 2018-03-26 ENCOUNTER — TELEPHONE (OUTPATIENT)
Dept: GASTROENTEROLOGY | Facility: CLINIC | Age: 43
End: 2018-03-26

## 2018-03-28 ENCOUNTER — TELEPHONE (OUTPATIENT)
Dept: GASTROENTEROLOGY | Facility: CLINIC | Age: 43
End: 2018-03-28

## 2018-03-28 DIAGNOSIS — Z12.11 ENCOUNTER FOR SCREENING COLONOSCOPY: Primary | ICD-10-CM

## 2018-03-28 NOTE — TELEPHONE ENCOUNTER
Patient scheduled for Colonoscopy     Indication for procedure. Perforated bowel and abdominal pain     Referring Provider. Naomi Carrillo MD    ? No     Arrival time verified? Patient instructed to arrive at 0630.     Facility location verified? [address] [facility], 5th floor.     Instructions given regarding prep and procedure. Transportation policy reviewed and reviewed prep. RN answered all questions.     Prep Type Golytely.     Are you taking any anticoagulants or blood thinners? Denies     Instructions given? Yes.     Electronic implanted devices? Denies     Pre procedure teaching completed? Yes    Transportation from procedure? Yes.     H&P / Pre op physical completed? N/A    Francis Reddy RN

## 2018-04-02 ENCOUNTER — HOSPITAL ENCOUNTER (OUTPATIENT)
Facility: AMBULATORY SURGERY CENTER | Age: 43
End: 2018-04-02
Attending: INTERNAL MEDICINE
Payer: COMMERCIAL

## 2018-04-02 ENCOUNTER — SURGERY (OUTPATIENT)
Age: 43
End: 2018-04-02

## 2018-04-02 VITALS
SYSTOLIC BLOOD PRESSURE: 106 MMHG | OXYGEN SATURATION: 98 % | RESPIRATION RATE: 18 BRPM | DIASTOLIC BLOOD PRESSURE: 65 MMHG | WEIGHT: 165 LBS | BODY MASS INDEX: 28.17 KG/M2 | HEIGHT: 64 IN | TEMPERATURE: 97.4 F | HEART RATE: 86 BPM

## 2018-04-02 LAB
COLONOSCOPY: NORMAL
HCG UR QL: NEGATIVE
INTERNAL QC OK POCT: YES

## 2018-04-02 RX ORDER — ONDANSETRON 2 MG/ML
4 INJECTION INTRAMUSCULAR; INTRAVENOUS
Status: DISCONTINUED | OUTPATIENT
Start: 2018-04-02 | End: 2018-04-03 | Stop reason: HOSPADM

## 2018-04-02 RX ORDER — LIDOCAINE 40 MG/G
CREAM TOPICAL
Status: DISCONTINUED | OUTPATIENT
Start: 2018-04-02 | End: 2018-04-03 | Stop reason: HOSPADM

## 2018-04-02 RX ORDER — FENTANYL CITRATE 50 UG/ML
INJECTION, SOLUTION INTRAMUSCULAR; INTRAVENOUS PRN
Status: DISCONTINUED | OUTPATIENT
Start: 2018-04-02 | End: 2018-04-02 | Stop reason: HOSPADM

## 2018-04-02 RX ADMIN — FENTANYL CITRATE 25 MCG: 50 INJECTION, SOLUTION INTRAMUSCULAR; INTRAVENOUS at 07:45

## 2018-04-02 NOTE — IP AVS SNAPSHOT
MRN:[id number]                      After Visit Summary   4/2/2018    Mellisa Thompson    MRN: [id number]           Thank you!     Thank you for choosing [facility] for your care. Our goal is always to provide you with excellent care. Hearing back from our patients is one way we can continue to improve our services. Please take a few minutes to complete the written survey that you may receive in the mail after you visit with us. Thank you!        Patient Information     Date Of Birth          1975        About your hospital stay     You were admitted on:  April 2, 2018 You last received care in the:  [facility]    You were discharged on:  April 2, 2018       Who to Call     For medical emergencies, please call 911.  For non-urgent questions about your medical care, please call your primary care provider or clinic, [phone]  For questions related to your surgery, please call your surgery clinic        Attending Provider     Provider Specialty    Naomi Carrillo MD Gastroenterology       Primary Care Provider Office Phone # Fax #    Ree Michele [phone]      Your next 10 appointments already scheduled     Jun 19, 2018 10:00 AM CDT   (Arrive by 9:45 AM)   Return Seizure with Brody Mohan MD   [facility] ([facility])    [address] [place] [address] [phone]              Further instructions from your care team       Discharge Instructions after Colonoscopy or Sigmoidoscopy       Today you had a Colonoscopy       Activity and Diet   You were given medicine for pain. You may be dizzy or sleepy.   For 24 hours:     Do not drive or use heavy equipment.     Do not make important decisions.     Do not drink any alcohol.   You may return to your normal diet and medicines.       Discomfort     Air was placed in your colon during the exam in order to see it. Walking helps to pass  "the air.     You may take Tylenol (acetaminophen) for pain unless your doctor has told you not to.   Do not take aspirin or ibuprofen (Advil, Motrin, or other anti-inflammatory   drugs) for _____ days.       Follow-up   ____ We took small tissue samples or polyps to study. Your doctor will call you with the results within two weeks.       When to call:       Call right away if you have:     Unusual pain in belly or chest pain not relieved with passing air.     More than 1 to 2 Tablespoons of bleeding from your rectum.     Fever above 100.6  F (37.5  C).       If you have severe pain, bleeding, or shortness of breath, go to an emergency room.       If you have questions, call:   Monday to Friday, 7 a.m. to 4:30 p.m.   Endoscopy: [phone] (We may have to call you back)       After hours   Hospital: [phone] (Ask for the GI fellow on call)     Pending Results     Date and Time Order Name Status Description    4/2/2018 0811 Surgical pathology exam In process             Admission Information     Date & Time Provider Department Dept. Phone    4/2/2018 Naomi Carrillo MD [facility] [phone]      Your Vitals Were     Blood Pressure Pulse Temperature Respirations Height Weight    111/62 55 97.4  F (36.3  C) (Temporal) 17 1.626 m (5' 4\") 74.8 kg (165 lb)    Pulse Oximetry BMI (Body Mass Index)                99% 28.32 kg/m2          EyesBot Information     EyesBot is an electronic gateway that provides easy, online access to your medical records. With EyesBot, you can request a clinic appointment, read your test results, renew a prescription or communicate with your care team.     To sign up for EyesBot visit the website at [email or web address]   You will be asked to enter the access code listed below, as well as some personal information. Please follow the directions to create your username and password.     Your access code is: [email or web address]  Expires: 7/1/2018  8:32 AM     Your " access code will  in 90 days. If you need help or a new code, please contact your [facility] or call [phone] for assistance.        Care EveryWhere ID     This is your Care EveryWhere ID. This could be used by other organizations to access your [place] medical records  [id number]        Equal Access to Services     LUIS M ROSAS : Hadii aad ku hadasho Soomaali, waaxda luqadaha, qaybta kaalmada adeegyada, rosio simmons hayjewelsn jory saraibradley zarate . So [facility] [phone].    ATENCIÓN: Si habla español, tiene a farah disposición servicios gratuitos de asistencia lingüística. Dennis al [phone].    We comply with applicable federal civil rights laws and [place] laws. We do not discriminate on the basis of race, color, national origin, age, disability, sex, sexual orientation, or gender identity.               Review of your medicines      UNREVIEWED medicines. Ask your doctor about these medicines        Dose / Directions    levETIRAcetam 1000 MG Tabs   Used for:  Convulsions, unspecified convulsion type (H)        Dose:  1000 mg   Take 1,000 mg by mouth 2 times daily   Quantity:  180 tablet   Refills:  3       vitamin D 2000 UNITS tablet   Used for:  Vitamin D deficiency        Dose:  1 tablet   Take 1 tablet by mouth daily Take one tablet daily.   Quantity:  90 tablet   Refills:  3                Protect others around you: Learn how to safely use, store and throw away your medicines at www.disposemymeds.org.             Medication List: This is a list of all your medications and when to take them. Check marks below indicate your daily home schedule. Keep this list as a reference.      Medications           Morning Afternoon Evening Bedtime As Needed    levETIRAcetam 1000 MG Tabs   Take 1,000 mg by mouth 2 times daily                                vitamin D 2000 UNITS tablet   Take 1 tablet by mouth daily Take one tablet daily.

## 2018-04-02 NOTE — IP AVS SNAPSHOT
[facility]    [address] [place] [address]    Phone:  [phone]    Fax:  [phone]                                       After Visit Summary   4/2/2018    Mellisa Thompson    MRN: [id number]           After Visit Summary Signature Page     I have received my discharge instructions, and my questions have been answered. I have discussed any challenges I see with this plan with the nurse or doctor.    ..........................................................................................................................................  Patient/Patient Representative Signature      ..........................................................................................................................................  Patient Representative Print Name and Relationship to Patient    ..................................................               ................................................  Date                                            Time    ..........................................................................................................................................  Reviewed by Signature/Title    ...................................................              ..............................................  Date                                                            Time

## 2018-04-02 NOTE — DISCHARGE INSTRUCTIONS
Discharge Instructions after Colonoscopy or Sigmoidoscopy       Today you had a Colonoscopy       Activity and Diet   You were given medicine for pain. You may be dizzy or sleepy.   For 24 hours:     Do not drive or use heavy equipment.     Do not make important decisions.     Do not drink any alcohol.   You may return to your normal diet and medicines.       Discomfort     Air was placed in your colon during the exam in order to see it. Walking helps to pass the air.     You may take Tylenol (acetaminophen) for pain unless your doctor has told you not to.   Do not take aspirin or ibuprofen (Advil, Motrin, or other anti-inflammatory   drugs) for _____ days.       Follow-up   ____ We took small tissue samples or polyps to study. Your doctor will call you with the results within two weeks.       When to call:       Call right away if you have:     Unusual pain in belly or chest pain not relieved with passing air.     More than 1 to 2 Tablespoons of bleeding from your rectum.     Fever above 100.6  F (37.5  C).       If you have severe pain, bleeding, or shortness of breath, go to an emergency room.       If you have questions, call:   Monday to Friday, 7 a.m. to 4:30 p.m.   Endoscopy: [phone] (We may have to call you back)       After hours   Hospital: [phone] (Ask for the GI fellow on call)

## 2018-04-02 NOTE — LETTER
"    Patient:  Mellisa Wu  :   1975  MRN:     [id number]        Ms.Norys Wu  [address]   [place] [address]        2018    Dear ,    We are writing to inform you of your test results.  I am covering Dr. Carrillo's results while she is on maternity leave.     The biopsies of the small intestine were all normal.  Please follow-up with Dr. Carrilol as previously discussed. If you have any questions, please don't hesitate to contact the Gastroenterology nurse at [phone].       Paul Freitas MD    [facility]  Inflammatory Bowel Disease Program  Division of Gastroenterology, Hepatology and Nutrition                        Resulted Orders   hCG qual urine POCT   Result Value Ref Range    HCG Qual Urine Negative neg    Internal QC OK Yes    Surgical pathology exam   Result Value Ref Range    Copath Report       Patient Name: MELLISA WU  MR#: [id number]  Specimen #: [id number]  Collected: 2018  Received: 2018  Reported: 4/3/2018 13:30  Ordering Phy(s): KRYSTAL CARRILLO    For improved result formatting, select 'View Enhanced Report Format' under   Linked Documents section.    SPECIMEN(S):  Ileum biopsy    FINAL DIAGNOSIS:  ILEUM BIOPSY:  - Ileal mucosa with no significant histologic abnormality  - Negative for granulomas, inflammation and dysplasia    I have personally reviewed all specimens and/or slides, including the   listed special stains, and used them  with my medical judgement to determine or confirm the final diagnosis.    Electronically signed out by:    Rae Mccoy M.D., PhD, Physicians    CLINICAL HISTORY:  Abnormal CT of the GI tract  GROSS:  The specimen is received in formalin with proper patient identification,   labeled \"small intestine, ileum  biopsy\".  The specimen consists of three irregular tan pieces of soft   tissue averaging 0.2 cm in greatest  dimensio n which are entirely submitted in cassette " A1. (Dictated by: Naun Rojas 4/2/2018 09:11 AM)    MICROSCOPIC:  Microscopic examination was performed.    CPT Codes:  A: 60045-NS1    TESTING LAB LOCATION:  [facility], [address]   [address]  [phone]    COLLECTION SITE:  Client: [facility]  Location: [facility] (B)    Resident  [id number]

## 2018-04-03 LAB — COPATH REPORT: NORMAL

## 2018-04-11 ENCOUNTER — CARE COORDINATION (OUTPATIENT)
Dept: GASTROENTEROLOGY | Facility: CLINIC | Age: 43
End: 2018-04-11

## 2018-04-11 NOTE — PROGRESS NOTES
Patient called requesting pathology results- reviewed results with patient.     Procedure report did mention diverticulosis which patient was aware of. She also notes anal irritation and itching since bowel prep. Per patient no history of hemorrhoids and change in stool pattern. Writer encouraged patient to keep area clean and dry and use calmoseptine twice a day to help with irritation. Patient verbalized understanding and will contact clinic if symptoms continue.

## 2018-06-04 ENCOUNTER — CARE COORDINATION (OUTPATIENT)
Dept: GASTROENTEROLOGY | Facility: CLINIC | Age: 43
End: 2018-06-04

## 2018-06-04 ENCOUNTER — TELEPHONE (OUTPATIENT)
Dept: SURGERY | Facility: CLINIC | Age: 43
End: 2018-06-04

## 2018-06-04 DIAGNOSIS — K64.4 EXTERNAL HEMORRHOIDS: Primary | ICD-10-CM

## 2018-06-04 NOTE — PROGRESS NOTES
Patient called with concerns for ongoing rectal itching and hemorrhoid pain. States she has been trying calmoseptine however symptoms have not improved. Discussed a referral for Colon and Rectal could be placed or she could see PCP. Patient requested referral.     She also mentioned canker like sores in mouth and she is wondering if could be related to bowel issues. Writer mentioned unsure if TB has manifestations of the mouth and encouraged her to follow up with ID or to see PCP for further evaluation as Dr. Carrillo is out on maternity leave. Patient verbalized understanding of plan.

## 2018-06-04 NOTE — TELEPHONE ENCOUNTER
[facility] Call Center    Phone Message    May a detailed message be left on voicemail: yes    Reason for Call: Other: Patient would like to discuss seeing a surgeon over Francine, she feels that she was already seen in the hospital and needs more of a surgical consult she does not want to have 2 separate appts for this.  Please call to follow up.     Action Taken: Message routed to:  [facility] ([facility] Colon Rectal

## 2018-06-04 NOTE — TELEPHONE ENCOUNTER
"Called pt to discuss need for appt with a surgeon. Explained that per note from GI pt is being referred for rectal itching and hemorrhoid pain which are both symptoms that our NP sees patients for regularly. Explained per Francine Norris NP-C that she is always welcome to schedule with a surgeon but this is not necessary for the symptoms she is experiencing. Pt states understanding. States she is concerned because she does not have the \"time or money\" to come to multiple appts with an NP and a surgeon. Pt states she strongly prefers to see a MD. Assisted pt to reschedule appt with Dr. Baker on 7/3/18 at 9am. Pt confirms date, time, and location. Trang KRAUSE LPN    "

## 2018-06-12 ENCOUNTER — TELEPHONE (OUTPATIENT)
Dept: NEUROLOGY | Facility: CLINIC | Age: 43
End: 2018-06-12

## 2018-06-12 DIAGNOSIS — R56.9 CONVULSIONS, UNSPECIFIED CONVULSION TYPE (H): ICD-10-CM

## 2018-06-12 RX ORDER — LEVETIRACETAM 1000 MG/1
1000 TABLET ORAL 2 TIMES DAILY
Qty: 180 TABLET | Refills: 3 | Status: SHIPPED | OUTPATIENT
Start: 2018-06-12 | End: 2019-05-16

## 2018-06-19 ENCOUNTER — OFFICE VISIT (OUTPATIENT)
Dept: NEUROLOGY | Facility: CLINIC | Age: 43
End: 2018-06-19
Payer: COMMERCIAL

## 2018-06-19 ENCOUNTER — APPOINTMENT (OUTPATIENT)
Dept: LAB | Facility: CLINIC | Age: 43
End: 2018-06-19
Payer: COMMERCIAL

## 2018-06-19 VITALS
OXYGEN SATURATION: 98 % | RESPIRATION RATE: 16 BRPM | SYSTOLIC BLOOD PRESSURE: 107 MMHG | BODY MASS INDEX: 30.25 KG/M2 | DIASTOLIC BLOOD PRESSURE: 62 MMHG | TEMPERATURE: 96.6 F | HEART RATE: 76 BPM | HEIGHT: 64 IN | WEIGHT: 177.2 LBS

## 2018-06-19 DIAGNOSIS — G40.209 PARTIAL SYMPTOMATIC EPILEPSY WITH COMPLEX PARTIAL SEIZURES, NOT INTRACTABLE, WITHOUT STATUS EPILEPTICUS (H): Primary | ICD-10-CM

## 2018-06-19 ASSESSMENT — PAIN SCALES - GENERAL: PAINLEVEL: NO PAIN (0)

## 2018-06-19 NOTE — NURSING NOTE
Chief Complaint   Patient presents with     RECHECK     [facility]- F/U SEIZURES   PT WANTS TO HAVE PRESCRIPTIONS FILLED OUT FOR THREE MONTHS.    Brady Celis, EMT

## 2018-06-19 NOTE — PROGRESS NOTES
Service Date: 2018        Ree Michele MD   [facility]    [address] [address]      RE: Mellisa Thompson   MRN: [id number]     : 1975              Dear Dr. Michele:        Ms. Mellisa Thompson is a 42-year-old female who 11 years ago had a right temporal lobectomy for mesiotemporal sclerosis which has been quite successful.  She has had done no seizures since then; perhaps, had some more auras at the beginning of the recovery period, but nothing since.  She is on levetiracetam 1000 twice a day.  I have not seen her for some time.  She moved out to [place] and then came back to [place] after she  her  and is living in the [facility] part of this [facility].  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      She was involved the [employer] [place] promoting epilepsy awareness and especially in the  population.      She has been on a stable dose of the anticonvulsant and no change in that.      On examination she had very interesting questions.  Her mental status exam is normal.  Blood pressure is 107/62.  Pulse 76.      In summary, she is seizure-free 10 years after her temporal lobectomy.  She wishes to remain on medication and I agree.  We will check a concentration of levetiracetam today.  We will be available for further consultation.  Incidentally recently she had abdominal pain which was significant and severe and ended up with a spontaneous perforation of the ileum for which no pathology was found, although there is diverticulosis of the colon.  She was treated with antibiotic and the infection resolved and the perforations healed.  I do not see how it has any relation to seizures or the anticonvulsants.      The patient had no seizures during this ordeal of being quite sick.  We  will see her as needed in the future and check a concentration of the levetiracetam and okay her medication.        Sincerely,        MD SID Domingo MD             D: 2018   T: 2018   MT: rickie      Name:     LUIGI WU   MRN:      [id number]        Account:      [id number]   :      1975           Service Date: 2018      Document: [id number]

## 2018-06-19 NOTE — MR AVS SNAPSHOT
"              After Visit Summary   6/19/2018    Mellisa Thompson    MRN: [id number]           Patient Information     Date Of Birth          1975        Visit Information        Provider Department      6/19/2018 10:00 AM Brody Mohan MD [facility] Neurology        Today's Diagnoses     Partial symptomatic epilepsy with complex partial seizures, not intractable, without status epilepticus (H)    -  1       Follow-ups after your visit        Follow-up notes from your care team     Return in about 1 year (around 6/19/2019).      Your next 10 appointments already scheduled     Jul 17, 2018  9:30 AM CDT   (Arrive by 9:15 AM)   New Patient Visit with Michael Baker MD   [facility] Colon and Rectal Surgery ([facility])    [address] [facility]  4th Floor  [facility] [address] [phone]              Who to contact     Please call your clinic at [phone] to:    Ask questions about your health    Make or cancel appointments    Discuss your medicines    Learn about your test results    Speak to your doctor            Additional Information About Your Visit        Care EveryWhere ID     This is your Care EveryWhere ID. This could be used by other organizations to access your [place] medical records  [id number]        Your Vitals Were     Pulse Temperature Respirations Height Pulse Oximetry Breastfeeding?    76 96.6  F (35.9  C) (Oral) 16 1.626 m (5' 4\") 98% No    BMI (Body Mass Index)                   30.42 kg/m2            Blood Pressure from Last 3 Encounters:   06/19/18 107/62   04/02/18 106/65   02/15/18 117/69    Weight from Last 3 Encounters:   06/19/18 80.4 kg (177 lb 3.2 oz)   04/02/18 74.8 kg (165 lb)   02/15/18 76.4 kg (168 lb 6.4 oz)              We Performed the Following     Keppra (Levetiracetam) Level        Primary Care Provider Office Phone # Fax #    Ree Michele [phone]       [address] [address]        Equal Access " to Services     LUIS M ROSAS : Eleuterio Garnett, wanupur kaplan, qarosio mccall. So [facility] [phone].    ATENCIÓN: Si habla español, tiene a farah disposición servicios gratuitos de asistencia lingüística. Llame al [phone].    We comply with applicable federal civil rights laws and [place] laws. We do not discriminate on the basis of race, color, national origin, age, disability, sex, sexual orientation, or gender identity.            Thank you!     Thank you for choosing [facility] NEUROLOGY  for your care. Our goal is always to provide you with excellent care. Hearing back from our patients is one way we can continue to improve our services. Please take a few minutes to complete the written survey that you may receive in the mail after your visit with us. Thank you!             Your Updated Medication List - Protect others around you: Learn how to safely use, store and throw away your medicines at www.disposemymeds.org.          This list is accurate as of 6/19/18 11:59 PM.  Always use your most recent med list.                   Brand Name Dispense Instructions for use Diagnosis    levETIRAcetam 1000 MG Tabs     180 tablet    Take 1,000 mg by mouth 2 times daily    Convulsions, unspecified convulsion type (H)       vitamin D 2000 units tablet     90 tablet    Take 1 tablet by mouth daily Take one tablet daily.    Vitamin D deficiency

## 2018-06-19 NOTE — LETTER
2018       RE: Mellisa Thompson  [address]  [place] [address]     Dear Colleague,    Thank you for referring your patient, Mellisa Thompson, to the [facility] NEUROLOGY at [facility]. Please see a copy of my visit note below.    Service Date: 2018        Ree Michele MD   [facility]    [address] [facility] [address]   [place] [address]      RE: Mellisa Thompson   MRN: [id number]     : 1975        Dear Dr. Michele:        Ms. Mellisa Thompson is a 42-year-old female who 11 years ago had a right temporal lobectomy for mesiotemporal sclerosis which has been quite successful.  She has had done no seizures since then; perhaps, had some more auras at the beginning of the recovery period, but nothing since.  She is on levetiracetam 1000 twice a day.  I have not seen her for some time.  She moved out to [place] and then came back to [place] after she  her  and is living in the [facility] part of [facility].  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      She was involved the [employer] [place] promoting epilepsy awareness and especially in the  population.      She has been on a stable dose of the anticonvulsant and no change in that.      On examination she had very interesting questions.  Her mental status exam is normal.  Blood pressure is 107/62.  Pulse 76.      In summary, she is seizure-free 10 years after her temporal lobectomy.  She wishes to remain on medication and I agree.  We will check a concentration of levetiracetam today.  We will be available for further consultation.  Incidentally recently she had abdominal pain which was significant and severe and ended up with a spontaneous perforation of the ileum for which no pathology was found, although  there is diverticulosis of the colon.  She was treated with antibiotic and the infection resolved and the perforations healed.  I do not see how it has any relation to seizures or the anticonvulsants.      The patient had no seizures during this ordeal of being quite sick.  We will see her as needed in the future and check a concentration of the levetiracetam and okay her medication.        D: 2018   T: 2018   MT: rickie      Name:     LUIGI WU   MRN:      [id number]        Account:      [id number]   :      1975           Service Date: 2018      Document: [id number]       Again, thank you for allowing me to participate in the care of your patient.      Sincerely,    Brody Mohan MD

## 2018-06-20 LAB — LEVETIRACETAM SERPL-MCNC: 29 UG/ML (ref 12–46)

## 2018-08-09 ENCOUNTER — TELEPHONE (OUTPATIENT)
Dept: NEUROLOGY | Facility: CLINIC | Age: 43
End: 2018-08-09

## 2018-08-09 NOTE — TELEPHONE ENCOUNTER
"Visual auras, and it started yesterday. \"The feeling of her auras are in her stomach and go up to her esophagus.\" The patient feels she is very stressed uot and does not want to go to work and have a seizure and would like to discuss this. I informed her that stress can induce seizures, and I understand her concern. I told patient that she should go into see her PCP if she needs to be seen sooner  was atimate on seeing Dr. Mohan and wanted me to put her in for Friday 08/17/2018 at 1:30pm. Patient confirmed the understanding. I also told her in our conversation that she should go ot he ED if things worsen and she is unable to get into her PCP. Patient confirmed her understanding.  "

## 2018-08-09 NOTE — TELEPHONE ENCOUNTER
[facility]    Phone Message    May a detailed message be left on voicemail: yes    Reason for Call: Other: Pt would like to speak with Dr. Mohan. Pt stated she is not feeling well and would like to discuss     Action Taken: Message routed to:  [facility] neuro clinic

## 2018-08-09 NOTE — TELEPHONE ENCOUNTER
[facility]    Phone Message    May a detailed message be left on voicemail: yes    Reason for Call: Other: Pt calling asking for a call back she is very concerned about not feeling well and per pt is related to her Seizures. Pt asking for a call back today. Second call today.      Action Taken: Message routed to:  [facility]  Neurology

## 2018-08-16 ENCOUNTER — OFFICE VISIT (OUTPATIENT)
Dept: PSYCHOLOGY | Facility: CLINIC | Age: 43
End: 2018-08-16
Payer: COMMERCIAL

## 2018-08-16 DIAGNOSIS — F33.0 MAJOR DEPRESSIVE DISORDER, RECURRENT EPISODE, MILD WITH SEASONAL PATTERN (H): ICD-10-CM

## 2018-08-16 DIAGNOSIS — F43.23 ADJUSTMENT DISORDER WITH MIXED ANXIETY AND DEPRESSED MOOD: Primary | ICD-10-CM

## 2018-08-16 NOTE — MR AVS SNAPSHOT
"              After Visit Summary   8/16/2018    Mellisa Thompson    MRN: [id number]           Patient Information     Date Of Birth          1975        Visit Information        Provider Department      8/16/2018 4:00 PM Joyce Huerta, PhD [facility]        Today's Diagnoses     Adjustment disorder with mixed anxiety and depressed mood    -  1    Major depressive disorder, recurrent episode, mild with seasonal pattern (H)           Follow-ups after your visit        Your next 10 appointments already scheduled     Sep 07, 2018 10:15 AM CDT   (Arrive by 10:00 AM)   MA SCREENING DIGITAL BILATERAL with UCBCMA1   [facility] ([facility] [place])    [address]  [facility] [address] [phone]           Do not use any powder, lotion or deodorant under your arms or on your breast. If you do, we will ask you to remove it before your exam.  Wear comfortable, two-piece clothing.  If you have any allergies, tell your care team.  Bring any previous mammograms from other facilities or have them mailed to the breast center. Three-dimensional (3D) mammograms are available at [place] locations in [facility], [facility], [facility], [place], [place], and [place]. [facility] locations include [place] and [facility] in [place]. Benefits of 3D mammograms include: - Improved rate of cancer detection - Decreases your chance of having to go back for more tests, which means fewer: - \"False-positive\" results (This means that there is an abnormal area but it isn't cancer.) - Invasive testing procedures, such as a biopsy or surgery - Can provide clearer images of the breast if you have dense breast tissue. 3D mammography is an optional exam that anyone can have with a 2D mammogram. It doesn't replace or take the place of a 2D mammogram. 2D mammograms remain an effective screening test for all women.  Not all " insurance companies cover the cost of a 3D mammogram. Check with your insurance.              Future tests that were ordered for you today     Open Future Orders        Priority Expected Expires Ordered    MA Screening Digital Bilateral Routine  8/31/2019 8/31/2018            Who to contact     Please call your clinic at [phone] to:    Ask questions about your health    Make or cancel appointments    Discuss your medicines    Learn about your test results    Speak to your doctor            Additional Information About Your Visit        Care EveryWhere ID     This is your Care EveryWhere ID. This could be used by other organizations to access your [place] medical records  [id number]         Blood Pressure from Last 3 Encounters:   08/17/18 117/69   06/19/18 107/62   04/02/18 106/65    Weight from Last 3 Encounters:   08/17/18 80.4 kg (177 lb 4 oz)   06/19/18 80.4 kg (177 lb 3.2 oz)   04/02/18 74.8 kg (165 lb)              Today, you had the following     No orders found for display       Primary Care Provider Office Phone # Fax #    Ree Michele [phone]       [address] [address]        Equal Access to Services     [facility]: Hadii satish ku hadliamo Sobart, waaxda luqadaha, qaybta kaalmada adevasylda, rosio zarate . So [facility] [phone].    ATENCIÓN: Si habla español, tiene a farah disposición servicios gratuitos de asistencia lingüística. Llame al [phone].    We comply with applicable federal civil rights laws and [place] laws. We do not discriminate on the basis of race, color, national origin, age, disability, sex, sexual orientation, or gender identity.            Thank you!     Thank you for choosing [facility]  for your care. Our goal is always to provide you with excellent care. Hearing back from our patients is one way we can continue to improve our services. Please take a few minutes to complete the  written survey that you may receive in the mail after your visit with us. Thank you!             Your Updated Medication List - Protect others around you: Learn how to safely use, store and throw away your medicines at www.disposemymeds.org.          This list is accurate as of 8/16/18 11:59 PM.  Always use your most recent med list.                   Brand Name Dispense Instructions for use Diagnosis    levETIRAcetam 1000 MG Tabs     180 tablet    Take 1,000 mg by mouth 2 times daily    Convulsions, unspecified convulsion type (H)       vitamin D 2000 units tablet     90 tablet    Take 1 tablet by mouth daily Take one tablet daily.    Vitamin D deficiency

## 2018-08-17 ENCOUNTER — OFFICE VISIT (OUTPATIENT)
Dept: NEUROLOGY | Facility: CLINIC | Age: 43
End: 2018-08-17
Payer: COMMERCIAL

## 2018-08-17 VITALS
DIASTOLIC BLOOD PRESSURE: 69 MMHG | HEART RATE: 58 BPM | HEIGHT: 64 IN | BODY MASS INDEX: 30.26 KG/M2 | SYSTOLIC BLOOD PRESSURE: 117 MMHG | WEIGHT: 177.25 LBS

## 2018-08-17 DIAGNOSIS — G44.209 TENSION HEADACHE: Primary | ICD-10-CM

## 2018-08-17 RX ORDER — LORAZEPAM 1 MG/1
1 TABLET ORAL EVERY 8 HOURS PRN
Qty: 20 TABLET | Refills: 1 | Status: SHIPPED | OUTPATIENT
Start: 2018-08-17 | End: 2018-09-27

## 2018-08-17 ASSESSMENT — PAIN SCALES - GENERAL: PAINLEVEL: NO PAIN (1)

## 2018-08-17 NOTE — PATIENT INSTRUCTIONS
This is to certify that Ms Mellisa Michael is suffering from anxiety attacks and is recommended to rest and be of work for 10 days.  Brody Mohan M.D.  [phone]

## 2018-08-17 NOTE — LETTER
2018       RE: Mellisa Wu  [address]  [place] [address]     Dear Colleague,    Thank you for referring your patient, Mellisa Wu, to the [facility] NEUROLOGY at [facility]. Please see a copy of my visit note below.    Service Date: 2018      Ree Michele MD   [facility]    [address]   [place] [address]      RE: Mellisa Wu   MRN: [id number]   : 1975      Dear Dr. Michele:      Mrs. Mellisa Wu is a 42-year-old status right temporal lobectomy for intractable epilepsy who has done very well from the seizure point of view.  She has been maintained on a steady dose of levetiracetam and that is 1000 mg twice a day with her last concentration seen in  of this year was 23.      She came in today semi-urgently as she is having increasing headaches and anxiety attacks related to conflicts at her place of employment.  She has worked for a month and they have a new boss and she has had some conflicts with this person.      She came in to be seen and has not had however any activation no auras which has been in the past.  She has continued on her medication.  She is teary, crying openly about this conflict.      PHYSICAL EXAMINATION:   GENERAL:   She was calmed down and examined.     VITAL SIGNS:   Her blood pressure was okay at 117/69.  Pulse is 58.  Weight is 177.     NEUROLOGIC:   Fundus is normal.  Normal eye movements.  Neck is supple.  Coordination is good.      In summary, stress reaction secondary to work conflict.      PLAN:  We gave her a lot of reassurance and prescribed some lorazepam 1 mg every 8 hours p.r.n. for anxiety, although she is not inclined to take it.  We will also give her a note that she should take a break about 10 days to think things through and she will do that.  We will see her as needed.      D: 2018   T: 2018   MT: AKA      Name:     MELLISA WU   MRN:       [id number]        Account:      [id number]   :      1975           Service Date: 2018      Document: [id number]       Again, thank you for allowing me to participate in the care of your patient.      Sincerely,    Brody Mohan MD

## 2018-08-17 NOTE — PROGRESS NOTES
Service Date: 2018      Ree Michele MD   [facility]    [address] [address]      RE: Mellisa Wu   MRN: [id number]   : 1975      Dear Dr. Michele:      Mrs. Mellisa Wu is a 42-year-old status right temporal lobectomy for intractable epilepsy who has done very well from the seizure point of view.  She has been maintained on a steady dose of levetiracetam and that is 1000 mg twice a day with her last concentration seen in  of this year was 23.      She came in today semi-urgently as she is having increasing headaches and anxiety attacks related to conflicts at her place of employment.  She has worked for a month and they have a new boss and she has had some conflicts with this person.      She came in to be seen and has not had however any activation no auras which has been in the past.  She has continued on her medication.  She is teary, crying openly about this conflict.      PHYSICAL EXAMINATION:   GENERAL:   She was calmed down and examined.     VITAL SIGNS:   Her blood pressure was okay at 117/69.  Pulse is 58.  Weight is 177.     NEUROLOGIC:   Fundus is normal.  Normal eye movements.  Neck is supple.  Coordination is good.      In summary, stress reaction secondary to work conflict.      PLAN:  We gave her a lot of reassurance and prescribed some lorazepam 1 mg every 8 hours p.r.n. for anxiety, although she is not inclined to take it.  We will also give her a note that she should take a break about 10 days to think things through and she will do that.  We will see her as needed.      Sincerely,       MD SID Dean MD             D: 2018   T: 2018   MT: AKA      Name:     MELLISA WU   MRN:      [id number]        Account:      [id number]   :      1975           Service Date: 2018      Document: [id number]

## 2018-08-17 NOTE — MR AVS SNAPSHOT
"              After Visit Summary   8/17/2018    Mellisa Thompson    MRN: [id number]           Patient Information     Date Of Birth          1975        Visit Information        Provider Department      8/17/2018 1:30 PM Brody Mohan MD [facility] Neurology        Today's Diagnoses     Tension headache    -  1      Care Instructions    This is to certify that Ms Mellisa Michael is suffering from anxiety attacks and is recommended to rest and be of work for 10 days.  Brody Mohan M.D.  [phone]          Follow-ups after your visit        Follow-up notes from your care team     Return if symptoms worsen or fail to improve.      Who to contact     Please call your clinic at [phone] to:    Ask questions about your health    Make or cancel appointments    Discuss your medicines    Learn about your test results    Speak to your doctor            Additional Information About Your Visit        Care EveryWhere ID     This is your Care EveryWhere ID. This could be used by other organizations to access your [place] medical records  [id number]        Your Vitals Were     Pulse Height BMI (Body Mass Index)             58 1.626 m (5' 4\") 30.42 kg/m2          Blood Pressure from Last 3 Encounters:   08/17/18 117/69   06/19/18 107/62   04/02/18 106/65    Weight from Last 3 Encounters:   08/17/18 80.4 kg (177 lb 4 oz)   06/19/18 80.4 kg (177 lb 3.2 oz)   04/02/18 74.8 kg (165 lb)              Today, you had the following     No orders found for display         Today's Medication Changes          These changes are accurate as of 8/17/18  2:04 PM.  If you have any questions, ask your nurse or doctor.               Start taking these medicines.        Dose/Directions    LORazepam 1 MG tablet   Commonly known as:  ATIVAN   Used for:  Tension headache   Started by:  Brody Mohan MD        Dose:  1 mg   Take 1 tablet (1 mg) by mouth every 8 hours as needed for anxiety   Quantity:  20 tablet   Refills:  1            Where " to get your medicines      Some of these will need a paper prescription and others can be bought over the counter.  Ask your nurse if you have questions.     Bring a paper prescription for each of these medications     LORazepam 1 MG tablet                Primary Care Provider Office Phone # Fax #    Ree Michele [phone]       [address]  [facility] [address]        Equal Access to Services     [facility] : Hadii aad ku hadasho Soomaali, waaxda luqadaha, qaybta kaalmada adeegyada, waxay idiin hayaan adeeg khtemitopesh la'aan . So [facility] [phone].    ATENCIÓN: Si habla español, tiene a farah disposición servicios gratuitos de asistencia lingüística. Dennis al [phone].    We comply with applicable federal civil rights laws and [place] laws. We do not discriminate on the basis of race, color, national origin, age, disability, sex, sexual orientation, or gender identity.            Thank you!     Thank you for choosing [facility] NEUROLOGY  for your care. Our goal is always to provide you with excellent care. Hearing back from our patients is one way we can continue to improve our services. Please take a few minutes to complete the written survey that you may receive in the mail after your visit with us. Thank you!             Your Updated Medication List - Protect others around you: Learn how to safely use, store and throw away your medicines at www.disposemymeds.org.          This list is accurate as of 8/17/18  2:04 PM.  Always use your most recent med list.                   Brand Name Dispense Instructions for use Diagnosis    levETIRAcetam 1000 MG Tabs     180 tablet    Take 1,000 mg by mouth 2 times daily    Convulsions, unspecified convulsion type (H)       LORazepam 1 MG tablet    ATIVAN    20 tablet    Take 1 tablet (1 mg) by mouth every 8 hours as needed for anxiety    Tension headache       vitamin D 2000 units tablet     90 tablet    Take 1 tablet by mouth daily Take  one tablet daily.    Vitamin D deficiency

## 2018-08-22 ENCOUNTER — OFFICE VISIT (OUTPATIENT)
Dept: PSYCHOLOGY | Facility: CLINIC | Age: 43
End: 2018-08-22
Payer: COMMERCIAL

## 2018-08-22 DIAGNOSIS — F43.22 ADJUSTMENT DISORDER WITH ANXIETY: ICD-10-CM

## 2018-08-22 DIAGNOSIS — F33.0 MAJOR DEPRESSIVE DISORDER, RECURRENT EPISODE, MILD WITH SEASONAL PATTERN (H): Primary | ICD-10-CM

## 2018-08-22 NOTE — MR AVS SNAPSHOT
"              After Visit Summary   8/22/2018    Mellisa Thompson    MRN: [id number]           Patient Information     Date Of Birth          1975        Visit Information        Provider Department      8/22/2018 1:00 PM Joyce Huerta, PhD [facility]        Today's Diagnoses     Major depressive disorder, recurrent episode, mild with seasonal pattern (H)    -  1    Adjustment disorder with anxiety           Follow-ups after your visit        Your next 10 appointments already scheduled     Sep 07, 2018 10:15 AM CDT   (Arrive by 10:00 AM)   MA SCREENING DIGITAL BILATERAL with UCBCMA1   [facility] ([facility] [place])    [address]  [facility] [address] [phone]           Do not use any powder, lotion or deodorant under your arms or on your breast. If you do, we will ask you to remove it before your exam.  Wear comfortable, two-piece clothing.  If you have any allergies, tell your care team.  Bring any previous mammograms from other facilities or have them mailed to the breast center. Three-dimensional (3D) mammograms are available at [place] locations in [facility], [facility], [facility], and [place]. [facility] locations include [place] and [facility] Center in [place]. Benefits of 3D mammograms include: - Improved rate of cancer detection - Decreases your chance of having to go back for more tests, which means fewer: - \"False-positive\" results (This means that there is an abnormal area but it isn't cancer.) - Invasive testing procedures, such as a biopsy or surgery - Can provide clearer images of the breast if you have dense breast tissue. 3D mammography is an optional exam that anyone can have with a 2D mammogram. It doesn't replace or take the place of a 2D mammogram. 2D mammograms remain an effective screening test for all women.  Not all insurance companies cover " the cost of a 3D mammogram. Check with your insurance.              Future tests that were ordered for you today     Open Future Orders        Priority Expected Expires Ordered    MA Screening Digital Bilateral Routine  8/31/2019 8/31/2018            Who to contact     Please call your clinic at [phone] to:    Ask questions about your health    Make or cancel appointments    Discuss your medicines    Learn about your test results    Speak to your doctor            Additional Information About Your Visit        Care EveryWhere ID     This is your Care EveryWhere ID. This could be used by other organizations to access your [place] medical records  [id number]         Blood Pressure from Last 3 Encounters:   08/17/18 117/69   06/19/18 107/62   04/02/18 106/65    Weight from Last 3 Encounters:   08/17/18 80.4 kg (177 lb 4 oz)   06/19/18 80.4 kg (177 lb 3.2 oz)   04/02/18 74.8 kg (165 lb)              Today, you had the following     No orders found for display       Primary Care Provider Office Phone # Fax #    Ree Michele [phone]       [address] [place] [address]        Equal Access to Services     [facility] : Hadii satish keene Sobart, waaxda lugeetaadaha, qaybta kaalmada cindy, rosio zarate . So [facility] [phone].    ATENCIÓN: Si habla español, tiene a farah disposición servicios gratuitos de asistencia lingüística. Christopherame al [phone].    We comply with applicable federal civil rights laws and [place] laws. We do not discriminate on the basis of race, color, national origin, age, disability, sex, sexual orientation, or gender identity.            Thank you!     Thank you for choosing [facility]ARE [facility]  for your care. Our goal is always to provide you with excellent care. Hearing back from our patients is one way we can continue to improve our services. Please take a few minutes to complete the written survey that you may  receive in the mail after your visit with us. Thank you!             Your Updated Medication List - Protect others around you: Learn how to safely use, store and throw away your medicines at www.disposemymeds.org.          This list is accurate as of 8/22/18 11:59 PM.  Always use your most recent med list.                   Brand Name Dispense Instructions for use Diagnosis    levETIRAcetam 1000 MG Tabs     180 tablet    Take 1,000 mg by mouth 2 times daily    Convulsions, unspecified convulsion type (H)       LORazepam 1 MG tablet    ATIVAN    20 tablet    Take 1 tablet (1 mg) by mouth every 8 hours as needed for anxiety    Tension headache       vitamin D 2000 units tablet     90 tablet    Take 1 tablet by mouth daily Take one tablet daily.    Vitamin D deficiency

## 2018-08-30 ENCOUNTER — TELEPHONE (OUTPATIENT)
Dept: NEUROLOGY | Facility: CLINIC | Age: 43
End: 2018-08-30

## 2018-08-30 NOTE — TELEPHONE ENCOUNTER
[facility]    Phone Message    May a detailed message be left on voicemail: yes    Reason for Call: Other: Pt requesting call back from Dr. Mohan. Pt stated she has a question for him related to her health     Action Taken: Message routed to:  [facility] neuro clinic

## 2018-08-31 NOTE — PROGRESS NOTES
"Health Psychology                                      Department of Medicine                                           [facility] Mail Code 740    Edwina Lara, Ph.D., L.P. [phone]  [address] [place] [address], [facility] Luzma Oleary, Ph.D.,  L.P. [phone]  [place] [address]  Med Hansen, Ph.D., A.MACIE.JOAO., L.P. [phone]       Joyce Huerta, PhD, LP [phone]  ________________________________________________________________________________________________  Health Psychology - Follow up Visit  Confidential Summary*    REFERRAL SOURCE  Self    CHIEF COMPLAINT/REASON FOR VISIT  Patient seen for session following a lapse of 11 months during which time the patient has been functioning well at a new job.  She returns in crisis after declining mental health (increasing anxiety with somatic symptoms) following a dramatic change in her job situation.        Subjective: Patient seen for session following return to treatment one week ago.    She began with report that after a visit with her neurologist, she elected to request a short term medical leave from her employment.  She described concern that the change in her work environment was putting more stress on her and she was concerned about the impact on her mental and therefore, physical health.  She reported that her neuologist offerred ativan but that she is reluctant to start this.  Patient described challenges in her sleep and overall ability to relax and is struggling with \"heart palptations\".  She is attempting to practice relaxation via meditation. Suggested that she might take the MBSR course to learn improved relaxation techniques.  Sent the scholarship application.    Patient discussed her increased anxiety surrounding application to graduate school.  Discussed how she might get support in the application process.  Offerred to be a source of accountability for her if she wants to " forward her applications.  If she is to move forward, she may be required to take the TOGL in September.      Patient described concern about weight gain in response to recent increased stress.  She voiced concern that she is limited on coping tools to manage anxiety.  Will target with addressing core cognitions and catastrophic thinking.       Objective:  Patient was on time for today s session, appropriately groomed and dressed, and demonstrated good eye contact.  She appeared alert and oriented.   Mood was anxious and dysphoric.  Patient adamantly denied suicidal or assaultive ideation, plan, or intent.       Assessment:  The patients is experiencing both depressive and anxious symptoms in response to job changes.      Plan: See in two weeks.    Time In: 1:00  Time Out:2:00    Diagnosis:  Axis I MDD, moderate, adjustment disorder with anxious features   Axis II Deferred   Axis III Epilepsy, see medical record   Axis IV Psychosocial and Environmental Stressors: work related     Joyce Huerta, Ph.D., L.P.      *In accordance with the Rules of the [place] Board of [employer], it is noted that psychological descriptions and scientific procedures underlying psychological evaluations have limitations.  Absolute predictions cannot be made based on information in this report.

## 2018-08-31 NOTE — PROGRESS NOTES
Health Psychology                                      Department of Medicine                                           [facility] Mail Code 741    Edwina Lara, Ph.D., L.P. [phone]  [address] [place] [address] Luzma Oleary, Ph.D.,  L.P. [phone]  [place] [address]  Med Hansen, Ph.D., A.B.P.P., L.P. [phone]       Joyce Huerta, PhD,  [phone]  ________________________________________________________________________________________________  Health Psychology - Follow up Visit  Confidential Summary*    REFERRAL SOURCE  Self    CHIEF COMPLAINT/REASON FOR VISIT  Patient seen for session following a lapse of 11 months during which time the patient has been functioning well at a new job.  She returns in crisis after declining mental health (increasing anxiety with somatic symptoms) following a dramatic change in her job situation.        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  This principal has been a friend of hers for over a decade and they worked very well together.  He was informed that his position was being terminated at the end of the school year and he left the school in April 2018.  After this, a new principal was brought in and the patients role and tasks changed and the environment at her job became more stressful.  She reported that her bilingual skills were being called upon to assist the parents at the school to adjust to the new leadership since the new principal is english speaking only.  She has been challenged with time management and balancing multiple roles.  She reported that she has become more stressed, having challenges sleeping, increased eating, decreased self care and has noticed increased auras which were formerly premorbid seizure symptoms.  In addition, she described a new  who is  "disorganized and the environment in the office is \"chaotic\".      She has made an appointment with her neurologist to evaluate her seizure potential.  As a reminder, patient has brain surgery to correct her severe seizure disorder and she has been seizure free for several years.      She described increased anxiety surrounding her desire to return to school to pursue a graduate degree and her ability to manage the application process with her current level of psychological upset.      Patient was tearful and shaking throughout the session.     Discussed patients coping mechanisms.  Encouraged her to consider meditation.  Will send an application to apply for a scholarship to the [facility] M[employer] program.  Encouraged her to return to yoga which has assisted her in the past.  Reviewed some of our previous cognitive coping strategies including decreasing catastrophizing and overestimating the likelihood of negative events.      Patient will be seen again to assist in supporting through this challenging transition in her work environment.    Objective:  Patient was on time for today s session, appropriately groomed and dressed, and demonstrated good eye contact.  She appeared alert and oriented.   Mood was anxious and dysphoric.  Patient adamantly denied suicidal or assaultive ideation, plan, or intent.       Assessment:  The patients is experiencing both depressive and anxious symptoms in response to job changes.      Plan: See in one week.    Time In: 4:00  Time Out: 5:00    Diagnosis:  Axis I MDD, moderate, adjustment disorder with anxious features   Axis II Deferred   Axis III Epilepsy, see medical record   Axis IV Psychosocial and Environmental Stressors: work related     Joyce Huerta, Ph.D., L.P.      *In accordance with the Rules of the [place] Board of Psychology, it is noted that psychological descriptions and scientific procedures underlying psychological evaluations have limitations.  Absolute predictions cannot be " made based on information in this report.

## 2018-09-05 ENCOUNTER — OFFICE VISIT (OUTPATIENT)
Dept: PSYCHOLOGY | Facility: CLINIC | Age: 43
End: 2018-09-05
Payer: COMMERCIAL

## 2018-09-05 DIAGNOSIS — F43.23 ADJUSTMENT DISORDER WITH MIXED ANXIETY AND DEPRESSED MOOD: Primary | ICD-10-CM

## 2018-09-05 DIAGNOSIS — F33.0 MAJOR DEPRESSIVE DISORDER, RECURRENT EPISODE, MILD WITH SEASONAL PATTERN (H): ICD-10-CM

## 2018-09-05 NOTE — MR AVS SNAPSHOT
After Visit Summary   9/5/2018    Mellisa Thompson    MRN: [id number]           Patient Information     Date Of Birth          1975        Visit Information        Provider Department      9/5/2018 11:00 AM Joyce Huerta, PhD [facility]        Today's Diagnoses     Adjustment disorder with mixed anxiety and depressed mood    -  1    Major depressive disorder, recurrent episode, mild with seasonal pattern (H)           Follow-ups after your visit        Who to contact     Please call your clinic at [phone] to:    Ask questions about your health    Make or cancel appointments    Discuss your medicines    Learn about your test results    Speak to your doctor            Additional Information About Your Visit        Care EveryWhere ID     This is your Care EveryWhere ID. This could be used by other organizations to access your [place] medical records  [id number]         Blood Pressure from Last 3 Encounters:   08/17/18 117/69   06/19/18 107/62   04/02/18 106/65    Weight from Last 3 Encounters:   08/17/18 80.4 kg (177 lb 4 oz)   06/19/18 80.4 kg (177 lb 3.2 oz)   04/02/18 74.8 kg (165 lb)              Today, you had the following     No orders found for display       Primary Care Provider Office Phone # Fax #    Ree Michele [phone]       [address] [place] [address]        Equal Access to Services     [facility] AH: Hadii aad ku hadasho Soomaali, waaxda luqadaha, qaybta kaalmada adeegyada, rosio simmons haytaty zarate . So [facility] [phone].    ATENCIÓN: Si habla español, tiene a farah disposición servicios gratuitos de asistencia lingüística. Llame al [phone].    We comply with applicable federal civil rights laws and [place] laws. We do not discriminate on the basis of race, color, national origin, age, disability, sex, sexual orientation, or gender identity.            Thank you!     Thank you for choosing [facility]  for your care. Our goal is always to provide you with excellent care. Hearing back from our patients is one way we can continue to improve our services. Please take a few minutes to complete the written survey that you may receive in the mail after your visit with us. Thank you!             Your Updated Medication List - Protect others around you: Learn how to safely use, store and throw away your medicines at www.disposemymeds.org.          This list is accurate as of 9/5/18 11:59 PM.  Always use your most recent med list.                   Brand Name Dispense Instructions for use Diagnosis    levETIRAcetam 1000 MG Tabs     180 tablet    Take 1,000 mg by mouth 2 times daily    Convulsions, unspecified convulsion type (H)       LORazepam 1 MG tablet    ATIVAN    20 tablet    Take 1 tablet (1 mg) by mouth every 8 hours as needed for anxiety    Tension headache       vitamin D 2000 units tablet     90 tablet    Take 1 tablet by mouth daily Take one tablet daily.    Vitamin D deficiency

## 2018-09-06 NOTE — TELEPHONE ENCOUNTER
Patient calls the clinic inquiring about a form regarding her medical leave that she states she e-mailed to Dr. Mohan. Her supervisor is requesting for this paperwork to be returned asa. I reported to Mellisa that I will check with Dr. Mohan regarding the papers but that I do not see them in our chart yet.

## 2018-09-07 ENCOUNTER — RADIANT APPOINTMENT (OUTPATIENT)
Dept: MAMMOGRAPHY | Facility: CLINIC | Age: 43
End: 2018-09-07
Attending: FAMILY MEDICINE
Payer: COMMERCIAL

## 2018-09-07 DIAGNOSIS — Z12.39 SCREENING BREAST EXAMINATION: ICD-10-CM

## 2018-09-07 DIAGNOSIS — Z12.31 VISIT FOR SCREENING MAMMOGRAM: ICD-10-CM

## 2018-09-07 NOTE — TELEPHONE ENCOUNTER
This nurse spoke with Dr. Mohan whom states he contacted the patient in regards to her request. Closing encounter.

## 2018-09-09 NOTE — PROGRESS NOTES
Health Psychology                                      Department of Medicine                                           [facility] Mail Code 742    Edwina Lara, Ph.D., L.P. [phone]  [address] [place] [address], [facility] Luzma Oleary, Ph.D.,  L.P. [phone]  [place] [address]  Med Hansen, Ph.D., A.B.P.P., L.P. [phone]       Joyce Huerta, PhD, LP [phone]  ________________________________________________________________________________________________  Health Psychology - Follow up Visit  Confidential Summary*    REFERRAL SOURCE  Self    CHIEF COMPLAINT/REASON FOR VISIT  Patient seen for session after experiencing increased anxiety and depressed mood following change in her job situation.        Subjective:  Patient began with report that she plans to return to work in one week and believes she is prepared for the new challenges at her job.  Discussed how she might utilize her relaxation skills and meditation practice to cope with increased demands and a change in leadership style and personality of her new principal.  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  She remains close friends with him and he is at another school.  Her new principal is described as stressed and is not bilingual although many of the families served are Chinese speaking only and the patient is being asked to serve as the primary contact for these families.  These duties are on top of her existing tasks.      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  Discussed her graduate school entrance exams including the TOFL and GRE and the essays she is required to write as part of the submission process.        Objective:  Patient was on time for today s session, appropriately groomed and  dressed, and demonstrated good eye contact.  She appeared alert and oriented.   Mood was anxious.  Patient adamantly denied suicidal or assaultive ideation, plan, or intent.       Assessment:  The patients is experiencing both depressive and anxious symptoms in response to job changes.      Plan: See in one week.Next session will discuss how she can establish appropriate professional boundaries with her new principal.    Time In: 11:00  Time Out::  12:00    Diagnosis:  Axis I MDD, moderate, adjustment disorder with anxious features   Axis II Deferred   Axis III Epilepsy, see medical record   Axis IV Psychosocial and Environmental Stressors: work related     Joyce Huerta, Ph.D., L.P.      *In accordance with the Rules of the [place] Board of Psychology, it is noted that psychological descriptions and scientific procedures underlying psychological evaluations have limitations.  Absolute predictions cannot be made based on information in this report.

## 2018-09-10 ENCOUNTER — TELEPHONE (OUTPATIENT)
Dept: NEUROLOGY | Facility: CLINIC | Age: 43
End: 2018-09-10

## 2018-09-11 NOTE — TELEPHONE ENCOUNTER
Forms previously received at [facility], completed and mailed out to patient.

## 2018-09-12 ENCOUNTER — OFFICE VISIT (OUTPATIENT)
Dept: PSYCHOLOGY | Facility: CLINIC | Age: 43
End: 2018-09-12
Payer: COMMERCIAL

## 2018-09-12 DIAGNOSIS — F43.23 ADJUSTMENT DISORDER WITH MIXED ANXIETY AND DEPRESSED MOOD: Primary | ICD-10-CM

## 2018-09-12 NOTE — MR AVS SNAPSHOT
After Visit Summary   9/12/2018    Mellisa Thompson    MRN: [id number]           Patient Information     Date Of Birth          1975        Visit Information        Provider Department      9/12/2018 3:30 PM Joyce Huerta, PhD [facility]        Today's Diagnoses     Adjustment disorder with mixed anxiety and depressed mood    -  1       Follow-ups after your visit        Your next 10 appointments already scheduled     Sep 25, 2018  3:00 PM CDT   (Arrive by 2:45 PM)   New Patient Visit with Michael Baker MD   [facility] ([facility])    [address] [place] [id number]   [phone]            Sep 27, 2018  8:00 AM CDT   (Arrive by 7:45 AM)   Return Visit with Joyce Huerta, PhD NIYAH   [facility] ([facility])    [address] [place] [address]   [phone]            Sep 27, 2018  3:30 PM CDT   Office Visit with Mario Gibbs MD   [facility]    [address] [place] [id number] [phone]           Bring a current list of meds and any records pertaining to this visit. For Physicals, please bring immunization records and any forms needing to be filled out. Please arrive 10 minutes early to complete paperwork.              Who to contact     Please call your clinic at [phone] to:    Ask questions about your health    Make or cancel appointments    Discuss your medicines    Learn about your test results    Speak to your doctor            Additional Information About Your Visit        [employer] Information     [employer] gives you secure access to your electronic health record. If you see a primary care provider, you can also send messages to your care team and make appointments. If you have questions, please call your primary care clinic.  If you do  not have a primary care provider, please call [phone] and they will assist you.      [employer] is an electronic gateway that provides easy, online access to your medical records. With [employer], you can request a clinic appointment, read your test results, renew a prescription or communicate with your care team.     To access your existing account, please contact your [facility] or call [phone] for assistance.        Care EveryWhere ID     This is your Care EveryWhere ID. This could be used by other organizations to access your [place] medical records  [id number]         Blood Pressure from Last 3 Encounters:   08/17/18 117/69   06/19/18 107/62   04/02/18 106/65    Weight from Last 3 Encounters:   08/17/18 80.4 kg (177 lb 4 oz)   06/19/18 80.4 kg (177 lb 3.2 oz)   04/02/18 74.8 kg (165 lb)              Today, you had the following     No orders found for display       Primary Care Provider Office Phone # Fax #    Ree Michele [phone]       [address] [facility] [address]  [facility] [address]        Equal Access to Services     [facility]: Hadii aad ku hadasho Sonickali, waaxda luqadaha, qaybta kaalmada adeegyada, rosio zarate . So [facility] [phone].    ATENCIÓN: Si habla español, tiene a farah disposición servicios gratuitos de asistencia lingüística. Llame al [phone].    We comply with applicable federal civil rights laws and [place] laws. We do not discriminate on the basis of race, color, national origin, age, disability, sex, sexual orientation, or gender identity.            Thank you!     Thank you for choosing [facility]  for your care. Our goal is always to provide you with excellent care. Hearing back from our patients is one way we can continue to improve our services. Please take a few minutes to complete the written survey that you may receive in the mail after your visit with us. Thank you!              Your Updated Medication List - Protect others around you: Learn how to safely use, store and throw away your medicines at www.disposemymeds.org.          This list is accurate as of 9/12/18 11:59 PM.  Always use your most recent med list.                   Brand Name Dispense Instructions for use Diagnosis    levETIRAcetam 1000 MG Tabs     180 tablet    Take 1,000 mg by mouth 2 times daily    Convulsions, unspecified convulsion type (H)       LORazepam 1 MG tablet    ATIVAN    20 tablet    Take 1 tablet (1 mg) by mouth every 8 hours as needed for anxiety    Tension headache       vitamin D 2000 units tablet     90 tablet    Take 1 tablet by mouth daily Take one tablet daily.    Vitamin D deficiency

## 2018-09-17 ENCOUNTER — OFFICE VISIT (OUTPATIENT)
Dept: DERMATOLOGY | Facility: CLINIC | Age: 43
End: 2018-09-17
Payer: COMMERCIAL

## 2018-09-17 DIAGNOSIS — L65.0 TELOGEN EFFLUVIUM: ICD-10-CM

## 2018-09-17 DIAGNOSIS — Q38.6 FORDYCE SPOTS: Primary | ICD-10-CM

## 2018-09-17 DIAGNOSIS — L30.9 DERMATITIS: ICD-10-CM

## 2018-09-17 RX ORDER — TACROLIMUS 1 MG/G
OINTMENT TOPICAL 2 TIMES DAILY
Qty: 60 G | Refills: 3 | Status: SHIPPED | OUTPATIENT
Start: 2018-09-17

## 2018-09-17 ASSESSMENT — PAIN SCALES - GENERAL: PAINLEVEL: NO PAIN (0)

## 2018-09-17 NOTE — NURSING NOTE
Dermatology Rooming Note    Mellisa Sharonyenni's goals for this visit include:   Chief Complaint   Patient presents with     Derm Problem     Mellisa comes to clinic stating she has had small bumps on her lips for several months.      Derm Problem     Mellisa would like to discuss hairloss. She noticed her hair began to shed 1 month ago.      Laura Sales LPN

## 2018-09-17 NOTE — PROGRESS NOTES
[facility] Dermatology Note      Dermatology Problem List:  1. Lip dermatitis: upper cutaneous lip    CC:   Chief Complaint   Patient presents with     Derm Problem     Mellisa comes to clinic stating she has had small bumps on her lips for several months.      Derm Problem     Mellisa would like to discuss hairloss. She noticed her hair began to shed 1 month ago.          Encounter Date: Sep 17, 2018    History of Present Illness:  Ms. Mellisa Thompson is a 42 year old female who {hpi:914515}    Burning sensation on bottom lip x2-3 months   - saw white flaking   - rough to touch of tongue   - trialed lipstick without improvement   - more involvement of vermilion lip   - skin of upper cutaneous lip has changed color - red; no symptoms    Hair loss - x1-1.5 months   - superior occipital scalp   - pruritus, flaking, increased sensitivity   - unsure if redness   - lots of job-related stress over the last few months      Past Medical History:   Patient Active Problem List   Diagnosis     Convulsions (H)     Sinus tachycardia     Adjustment disorder with mixed anxiety and depressed mood     Seasonal affective disorder (H)     Major depressive disorder, recurrent episode, mild with seasonal pattern (H)     Perforated bowel (H)     Past Medical History:   Diagnosis Date     Anxiety      Crohn's disease (H) 01/2018     Depression      Epilepsy (H)      Past Surgical History:   Procedure Laterality Date     ANKLE SURGERY Right 1999     BRAIN SURGERY  2007    right temporal lobectomy      COLONOSCOPY N/A 4/2/2018    Procedure: COMBINED COLONOSCOPY, SINGLE OR MULTIPLE BIOPSY/POLYPECTOMY BY BIOPSY;  colonoscopy;  Surgeon: Naomi Carrillo MD;  Location: [facility] OR       Social History:   reports that she has never smoked. She has never used smokeless tobacco. She reports that she drinks alcohol. She reports that she does not use illicit drugs.    Family History:  Family History   Problem Relation Age of Onset      Hypertension Mother      Cancer Father      prostate     Cancer Paternal Grandfather      Diabetes Maternal Grandmother      Melanoma No family hx of      Skin Cancer No family hx of        Medications:  Current Outpatient Prescriptions   Medication Sig Dispense Refill     Cholecalciferol (VITAMIN D) 2000 UNITS tablet Take 1 tablet by mouth daily Take one tablet daily. 90 tablet 3     levETIRAcetam 1000 MG TABS Take 1,000 mg by mouth 2 times daily 180 tablet 3     LORazepam (ATIVAN) 1 MG tablet Take 1 tablet (1 mg) by mouth every 8 hours as needed for anxiety 20 tablet 1     No Known Allergies      Review of Systems:  -{ROS:612497}  -Constitutional: The patient denies fatigue, fevers, chills, unintended weight loss, and night sweats.  -HEENT: Patient denies nonhealing oral sores.  -Skin: As above in HPI. No additional skin concerns.    Physical exam:  Vitals: There were no vitals taken for this visit.  GEN: {RFGeneralAppearance:604779}   SKIN: {Skin Exam:218192}  -{Skin Exam Derm:521043}  -{Skin Exam Derm:671342}  -{Skin Exam Derm:383787}  -No other lesions of concern on areas examined.     Impression/Plan:  1. {Diagnosesderm:480163}    {rfplan:428411}    ***    2. {Diagnosesderm:665932}    {rfplan:082311}    ***    3. {Diagnosesderm:317761}    {rfplan:309326}    ***    4. {Diagnosesderm:007315}    {rfplan:209204}    ***    CC  *** on close of this encounter.  Follow-up {rfmultiple:185638} {follow up in days/weeks/months:999845}.       Staff Involved:  Staff Only    Martínez Osorio MD   of Dermatology  Department of Dermatology  [facility] [employer]

## 2018-09-17 NOTE — MR AVS SNAPSHOT
After Visit Summary   9/17/2018    Mellisa Thompson    MRN: [id number]           Patient Information     Date Of Birth          1975        Visit Information        Provider Department      9/17/2018 7:55 AM Martínez Osorio MD [facility] Derm[facility]        Today's Diagnoses     Walter spots    -  1    Dermatitis        Telogen effluvium           Follow-ups after your visit        Follow-up notes from your care team     Return if symptoms worsen or fail to improve.      Your next 10 appointments already scheduled     Sep 19, 2018  3:00 PM CDT   (Arrive by 2:45 PM)   Return Visit with Joyce Huerta, PhD [facility] ([facility])    [address] [facility]  [address] [facility] [address] [phone]              Who to contact     Please call your clinic at [phone] to:    Ask questions about your health    Make or cancel appointments    Discuss your medicines    Learn about your test results    Speak to your doctor            Additional Information About Your Visit        Care EveryWhere ID     This is your Care EveryWhere ID. This could be used by other organizations to access your [place] medical records  [id number]         Blood Pressure from Last 3 Encounters:   08/17/18 117/69   06/19/18 107/62   04/02/18 106/65    Weight from Last 3 Encounters:   08/17/18 80.4 kg (177 lb 4 oz)   06/19/18 80.4 kg (177 lb 3.2 oz)   04/02/18 74.8 kg (165 lb)              Today, you had the following     No orders found for display         Today's Medication Changes          These changes are accurate as of 9/17/18  8:49 AM.  If you have any questions, ask your nurse or doctor.               Start taking these medicines.        Dose/Directions    tacrolimus 0.1 % ointment   Commonly known as:  PROTOPIC   Used for:  Dermatitis   Started by:  Martínez Osorio MD        Apply topically 2 times daily   Quantity:  60 g   Refills:  3            Where to get  your medicines      These medications were sent to [id number] - [place], [place] - [address] AT [facility] [address]  [address], [place] [address]     Phone:  [phone]     tacrolimus 0.1 % ointment                Primary Care Provider Office Phone # Fax #    Ree Michele [phone]       [address] [facility] [address]  [facility] [address]        Equal Access to Services     LUIS M ROSAS : Hadii aad ku hadasho Soomaali, waaxda luqadaha, qaybta kaalmada adeegyada, waxay idiin hayaan adeeg kharabradley dominguez. So [facility] [phone].    ATENCIÓN: Si habla español, tiene a farah disposición servicios gratuitos de asistencia lingüística. Dennis al [phone].    We comply with applicable federal civil rights laws and [place] laws. We do not discriminate on the basis of race, color, national origin, age, disability, sex, sexual orientation, or gender identity.            Thank you!     Thank you for choosing [facility] DERMATOLOGY  for your care. Our goal is always to provide you with excellent care. Hearing back from our patients is one way we can continue to improve our services. Please take a few minutes to complete the written survey that you may receive in the mail after your visit with us. Thank you!             Your Updated Medication List - Protect others around you: Learn how to safely use, store and throw away your medicines at www.disposemymeds.org.          This list is accurate as of 9/17/18  8:49 AM.  Always use your most recent med list.                   Brand Name Dispense Instructions for use Diagnosis    levETIRAcetam 1000 MG Tabs     180 tablet    Take 1,000 mg by mouth 2 times daily    Convulsions, unspecified convulsion type (H)       LORazepam 1 MG tablet    ATIVAN    20 tablet    Take 1 tablet (1 mg) by mouth every 8 hours as needed for anxiety    Tension headache       tacrolimus 0.1 % ointment    PROTOPIC    60 g    Apply topically 2 times daily     Dermatitis       vitamin D 2000 units tablet     90 tablet    Take 1 tablet by mouth daily Take one tablet daily.    Vitamin D deficiency

## 2018-09-17 NOTE — PROGRESS NOTES
[facility] Dermatology Note      Dermatology Problem List:  1. 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     2.  Dermatitis, NOS, of the chest.     - History consistent with physical/heat induced urticaria.  Started her on triamcinolone ointment 0.1% and avoidance measures.     3.  Dermatitis, upper lip   - Protopic BID, avoidance of cosmetics  4. Walter Spots, vermilion lips    Encounter Date: Sep 17, 2018    CC:   Chief Complaint   Patient presents with     Derm Problem     Mellisa comes to clinic stating she has had small bumps on her lips for several months.      Derm Problem     Mellisa would like to discuss hairloss. She noticed her hair began to shed 1 month ago.          History of Present Illness:  Ms. Mellisa Thompson is a 42 year old female who presents in self referral for spots on her lips.  Patient states she started noticing spots more on the lateral aspect of her lower lip for the past several months.  She is concerned that these are going to extend towards the medial lip and become more noticeable and larger.  She states she can feel the bumps with her tongue as she runs the tongue along the lip.  More recently she has started developing these white bumps on the upper lip more centrally.  She has not noted any changes in her cosmetics.  She does not use any moisturizing products for her lips.  The bumps are accompanied by occasional itch and burning sensation. She also is bothered by red-brown discoloration along the upper lip.      She is also concerned about hair loss at today's visit which has been going on for approximately 1-1-1/2 months duration.  It is most noticeable on the superior occipital scalp.  Is accompanied by pruritus scale and increased scalp sensitivity.  She is unable to assess if she has had erythema.  She does endorse significant job-related stress in the last couple of months.  Of note she  was last seen in 2016 at which time she was noted to have androgenetic alopecia and it was recommended that she use alternating ketoconazole and zinc shampoo and Rogaine.     She has no other concerns at today's visit.    Past Medical History:   Patient Active Problem List   Diagnosis     Convulsions (H)     Sinus tachycardia     Adjustment disorder with mixed anxiety and depressed mood     Seasonal affective disorder (H)     Major depressive disorder, recurrent episode, mild with seasonal pattern (H)     Perforated bowel (H)     Past Medical History:   Diagnosis Date     Anxiety      Crohn's disease (H) 01/2018     Depression      Epilepsy (H)      Past Surgical History:   Procedure Laterality Date     ANKLE SURGERY Right 1999     BRAIN SURGERY  2007    right temporal lobectomy      COLONOSCOPY N/A 4/2/2018    Procedure: COMBINED COLONOSCOPY, SINGLE OR MULTIPLE BIOPSY/POLYPECTOMY BY BIOPSY;  colonoscopy;  Surgeon: Naomi Carrillo MD;  Location: [facility] OR       Social History:   reports that she has never smoked. She has never used smokeless tobacco. She reports that she drinks alcohol. She reports that she does not use illicit drugs.    Family History:  Family History   Problem Relation Age of Onset     Hypertension Mother      Cancer Father      prostate     Cancer Paternal Grandfather      Diabetes Maternal Grandmother      Melanoma No family hx of      Skin Cancer No family hx of        Medications:  Current Outpatient Prescriptions   Medication Sig Dispense Refill     Cholecalciferol (VITAMIN D) 2000 UNITS tablet Take 1 tablet by mouth daily Take one tablet daily. 90 tablet 3     levETIRAcetam 1000 MG TABS Take 1,000 mg by mouth 2 times daily 180 tablet 3     LORazepam (ATIVAN) 1 MG tablet Take 1 tablet (1 mg) by mouth every 8 hours as needed for anxiety 20 tablet 1     tacrolimus (PROTOPIC) 0.1 % ointment Apply topically 2 times daily 60 g 3        No Known Allergies      Review of Systems:  -As per  HPI  -Constitutional: The patient denies fatigue, fevers, chills, unintended weight loss, and night sweats.  -HEENT: Patient denies nonhealing oral sores.  -Skin: As above in HPI. No additional skin concerns.    Physical exam:  Vitals: There were no vitals taken for this visit.  GEN: This is a well developed, well-nourished female in no acute distress, in a pleasant mood.    SKIN: Focused examination of the face was performed.  -faint 1mm rim of hyperpigmentation along the upper vermillion border  -numerous 1mm white papules on the b/l lateral lower lip and the more mucosal aspect of the medial upper lip  -No other lesions of concern on areas examined.   -diffuse thinning of hair on crown of scalp     Impression/Plan:  1. Walter spots    Discussed benign nature of lesions. No treatment necessary    2. Dermatitis of the upper lip with Hyperpigmentation    Discussed discontinuing topicals/cosmetics to the lips and instead using Vaseline only    Tacrolimus 0.1% ointment BID if needed    3. Telogen Effluvium overlying background androgenetic alopecia    Patient to continue alternating ketoconazole and zinc shampoo.      She may continue to use Rogaine if desired for her underlying and genetic alopecia.      She was reassured that the type of hair loss that she is presenting with today, telogen effluvium, is temporary in nature and often coincides with times of stress.    CC Dr. Michele on close of this encounter.    Follow-up prn for new or changing lesions.       Dr. PERNELL Osorio staffed the patient.    Staff Involved:  Resident(Cheryl Badillo)/Staff(as above)    Staff Physician Comments:   I saw and evaluated the patient with the resident and I agree with the assessment and plan. I was present for the examination..    Martínez Osorio MD    Department of Dermatology

## 2018-09-17 NOTE — LETTER
9/17/2018       RE: Mellisa Thompson  [address]  [place] [address]     Dear Colleague,    Thank you for referring your patient, Mellisa Thompson, to the [facility] DERMATOLOGY at [facility]. Please see a copy of my visit note below.    [facility] Dermatology Note      Dermatology Problem List:  1. 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     2.  Dermatitis, NOS, of the chest.     - History consistent with physical/heat induced urticaria.  Started her on triamcinolone ointment 0.1% and avoidance measures.     3.  Dermatitis, upper lip   - Protopic BID, avoidance of cosmetics  4. [place] Spots, vermilion lips    Encounter Date: Sep 17, 2018    CC:   Chief Complaint   Patient presents with     Derm Problem     Mellisa comes to clinic stating she has had small bumps on her lips for several months.      Derm Problem     Mellisa would like to discuss hairloss. She noticed her hair began to shed 1 month ago.          History of Present Illness:  Ms. Mellisa Thompson is a 42 year old female who presents in self referral for spots on her lips.  Patient states she started noticing spots more on the lateral aspect of her lower lip for the past several months.  She is concerned that these are going to extend towards the medial lip and become more noticeable and larger.  She states she can feel the bumps with her tongue as she runs the tongue along the lip.  More recently she has started developing these white bumps on the upper lip more centrally.  She has not noted any changes in her cosmetics.  She does not use any moisturizing products for her lips.  The bumps are accompanied by occasional itch and burning sensation. She also is bothered by red-brown discoloration along the upper lip.      She is also concerned about hair loss at today's visit which has been going on for approximately  1-1-1/2 months duration.  It is most noticeable on the superior occipital scalp.  Is accompanied by pruritus scale and increased scalp sensitivity.  She is unable to assess if she has had erythema.  She does endorse significant job-related stress in the last couple of months.  Of note she was last seen in 2016 at which time she was noted to have androgenetic alopecia and it was recommended that she use alternating ketoconazole and zinc shampoo and Rogaine.     She has no other concerns at today's visit.    Past Medical History:   Patient Active Problem List   Diagnosis     Convulsions (H)     Sinus tachycardia     Adjustment disorder with mixed anxiety and depressed mood     Seasonal affective disorder (H)     Major depressive disorder, recurrent episode, mild with seasonal pattern (H)     Perforated bowel (H)     Past Medical History:   Diagnosis Date     Anxiety      Crohn's disease (H) 01/2018     Depression      Epilepsy (H)      Past Surgical History:   Procedure Laterality Date     ANKLE SURGERY Right 1999     BRAIN SURGERY  2007    right temporal lobectomy      COLONOSCOPY N/A 4/2/2018    Procedure: COMBINED COLONOSCOPY, SINGLE OR MULTIPLE BIOPSY/POLYPECTOMY BY BIOPSY;  colonoscopy;  Surgeon: Naomi Carrillo MD;  Location: [facility] OR       Social History:   reports that she has never smoked. She has never used smokeless tobacco. She reports that she drinks alcohol. She reports that she does not use illicit drugs.    Family History:  Family History   Problem Relation Age of Onset     Hypertension Mother      Cancer Father      prostate     Cancer Paternal Grandfather      Diabetes Maternal Grandmother      Melanoma No family hx of      Skin Cancer No family hx of        Medications:  Current Outpatient Prescriptions   Medication Sig Dispense Refill     Cholecalciferol (VITAMIN D) 2000 UNITS tablet Take 1 tablet by mouth daily Take one tablet daily. 90 tablet 3     levETIRAcetam 1000 MG TABS Take 1,000 mg by mouth  2 times daily 180 tablet 3     LORazepam (ATIVAN) 1 MG tablet Take 1 tablet (1 mg) by mouth every 8 hours as needed for anxiety 20 tablet 1     tacrolimus (PROTOPIC) 0.1 % ointment Apply topically 2 times daily 60 g 3        No Known Allergies      Review of Systems:  -As per HPI  -Constitutional: The patient denies fatigue, fevers, chills, unintended weight loss, and night sweats.  -HEENT: Patient denies nonhealing oral sores.  -Skin: As above in HPI. No additional skin concerns.    Physical exam:  Vitals: There were no vitals taken for this visit.  GEN: This is a well developed, well-nourished female in no acute distress, in a pleasant mood.    SKIN: Focused examination of the face was performed.  -faint 1mm rim of hyperpigmentation along the upper vermillion border  -numerous 1mm white papules on the b/l lateral lower lip and the more mucosal aspect of the medial upper lip  -No other lesions of concern on areas examined.   -diffuse thinning of hair on crown of scalp     Impression/Plan:  1. [place] spots    Discussed benign nature of lesions. No treatment necessary    2. Dermatitis of the upper lip with Hyperpigmentation    Discussed discontinuing topicals/cosmetics to the lips and instead using Vaseline only    Tacrolimus 0.1% ointment BID if needed    3. Telogen Effluvium overlying background androgenetic alopecia    Patient to continue alternating ketoconazole and zinc shampoo.      She may continue to use Rogaine if desired for her underlying and genetic alopecia.      She was reassured that the type of hair loss that she is presenting with today, telogen effluvium, is temporary in nature and often coincides with times of stress.    CC Dr. Michele on close of this encounter.    Follow-up prn for new or changing lesions.       Dr. PERNELL Osorio staffed the patient.    Staff Involved:  Resident(Cheryl Badillo)/Staff(as above)    Staff Physician Comments:   I saw and evaluated the patient with the resident and  I agree with the assessment and plan. I was present for the examination..    Martínez Osorio MD    Department of Dermatology

## 2018-09-19 ENCOUNTER — OFFICE VISIT (OUTPATIENT)
Dept: PSYCHOLOGY | Facility: CLINIC | Age: 43
End: 2018-09-19
Payer: COMMERCIAL

## 2018-09-19 DIAGNOSIS — F43.23 ADJUSTMENT DISORDER WITH MIXED ANXIETY AND DEPRESSED MOOD: Primary | ICD-10-CM

## 2018-09-19 NOTE — MR AVS SNAPSHOT
After Visit Summary   9/19/2018    Mellisa Thompson    MRN: [id number]           Patient Information     Date Of Birth          1975        Visit Information        Provider Department      9/19/2018 3:30 PM Joyce Huerta, PhD [facility]        Today's Diagnoses     Adjustment disorder with mixed anxiety and depressed mood    -  1       Follow-ups after your visit        Your next 10 appointments already scheduled     Sep 25, 2018  3:00 PM CDT   (Arrive by 2:45 PM)   New Patient Visit with Michael Baker MD   [facility] ([facility])    [address]  [address] [facility] [id number] [phone]            Sep 27, 2018  3:30 PM CDT   Office Visit with Mario Gibbs MD   [facility] ([facility])    [address] [place] [address] [phone]           Bring a current list of meds and any records pertaining to this visit. For Physicals, please bring immunization records and any forms needing to be filled out. Please arrive 10 minutes early to complete paperwork.              Who to contact     Please call your clinic at [phone] to:    Ask questions about your health    Make or cancel appointments    Discuss your medicines    Learn about your test results    Speak to your doctor            Additional Information About Your Visit        [employer] Information     [employer] gives you secure access to your electronic health record. If you see a primary care provider, you can also send messages to your care team and make appointments. If you have questions, please call your primary care clinic.  If you do not have a primary care provider, please call [phone] and they will assist you.      [employer] is an electronic gateway that provides easy, online access to your medical records. With [employer], you can request a clinic appointment, read your test  results, renew a prescription or communicate with your care team.     To access your existing account, please contact your [facility] or call [phone] for assistance.        Care EveryWhere ID     This is your Care EveryWhere ID. This could be used by other organizations to access your [place] medical records  [id number]         Blood Pressure from Last 3 Encounters:   08/17/18 117/69   06/19/18 107/62   04/02/18 106/65    Weight from Last 3 Encounters:   08/17/18 80.4 kg (177 lb 4 oz)   06/19/18 80.4 kg (177 lb 3.2 oz)   04/02/18 74.8 kg (165 lb)              Today, you had the following     No orders found for display       Primary Care Provider Office Phone # Fax #    Ree Michele [phone]       [address]  [facility] [address]        Equal Access to Services     [facility]IS [facility] HospitalGLENYS : Hadii aad ku hadasho Soomaali, waaxda luqadaha, qaybta kaalmada adeegyada, waxay megain hayjewelsn jory zarate . So [facility] [phone].    ATENCIÓN: Si habla español, tiene a farah disposición servicios gratuitos de asistencia lingüística. Dennis al [phone].    We comply with applicable federal civil rights laws and [place] laws. We do not discriminate on the basis of race, color, national origin, age, disability, sex, sexual orientation, or gender identity.            Thank you!     Thank you for choosing [facility]  for your care. Our goal is always to provide you with excellent care. Hearing back from our patients is one way we can continue to improve our services. Please take a few minutes to complete the written survey that you may receive in the mail after your visit with us. Thank you!             Your Updated Medication List - Protect others around you: Learn how to safely use, store and throw away your medicines at www.disposemymeds.org.          This list is accurate as of 9/19/18 11:59 PM.  Always use your most recent med list.                    Brand Name Dispense Instructions for use Diagnosis    levETIRAcetam 1000 MG Tabs     180 tablet    Take 1,000 mg by mouth 2 times daily    Convulsions, unspecified convulsion type (H)       LORazepam 1 MG tablet    ATIVAN    20 tablet    Take 1 tablet (1 mg) by mouth every 8 hours as needed for anxiety    Tension headache       tacrolimus 0.1 % ointment    PROTOPIC    60 g    Apply topically 2 times daily    Dermatitis       vitamin D 2000 units tablet     90 tablet    Take 1 tablet by mouth daily Take one tablet daily.    Vitamin D deficiency

## 2018-09-21 NOTE — PROGRESS NOTES
Health Psychology                                      Department of Medicine                                           [facility] Mail Code 741    Edwina Lara, Ph.D., L.P. [phone]  [address] [place] [address], [facility] Luzma Oleary, Ph.D.,  L.P. [phone]  [place] [address]  Med Hansen, Ph.D., A.B.P.P., L.P. [phone]       Joyce Huerta, PhD, LP [phone]  ________________________________________________________________________________________________  Health Psychology - Follow up Visit  Confidential Summary*    REFERRAL SOURCE  Self    CHIEF COMPLAINT/REASON FOR VISIT  Patient seen for session after experiencing increased anxiety and depressed mood following change in her job situation.        Subjective:  Patient began with report that she has returned to work and is noticing that her ability to tolerate stress is diminished.  She remains concerned about the impact of the stressful environment (increased demands secondary to change in principal, personality style of principal and ).  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  Discussed how she might cope with distractions and including her principal in on these concerns.      Patient continues to voice the belief that she is not in a position that fulfills her purpose for her life and continues to desire to pursue entrepreneurial efforts in epilepsy.  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  She has applied to a graduate program in [place] and will hear in December.  The patient was encouraged to apply to other programs given the  competitiveness of the program but she denied interest and maintains that she has to trust her gut.    The patient also reported that she has made an appointment to be evaluated for reproductive potential.  She reported that she now realizes that she would like to raise a child but she did not feel same while in difficult relationship with her .  Supported her desires and offered some education on a group called Single Moms [employer].        Objective:  Patient was on time for today s session, appropriately groomed and dressed, and demonstrated good eye contact.  She appeared alert and oriented.   Mood was mildly anxious surrounding job concerns.  Patient adamantly denied suicidal or assaultive ideation, plan, or intent.       Assessment:  The patients is experiencing both depressive and anxious symptoms in response to job challenges and career direction decisions.      Plan: See in one week, morning better.     Time In: 330  Time Out::  430    Diagnosis:  Axis I MDD, mild, adjustment disorder with anxious features   Axis II Deferred   Axis III Epilepsy, see medical record   Axis IV Psychosocial and Environmental Stressors: work related     Joyce Huerta, Ph.D., L.P.      *In accordance with the Rules of the [place] Board of [employer], it is noted that psychological descriptions and scientific procedures underlying psychological evaluations have limitations.  Absolute predictions cannot be made based on information in this report.

## 2018-09-25 ENCOUNTER — OFFICE VISIT (OUTPATIENT)
Dept: SURGERY | Facility: CLINIC | Age: 43
End: 2018-09-25
Payer: COMMERCIAL

## 2018-09-25 VITALS
HEIGHT: 64 IN | WEIGHT: 177.3 LBS | TEMPERATURE: 97.6 F | BODY MASS INDEX: 30.27 KG/M2 | HEART RATE: 60 BPM | DIASTOLIC BLOOD PRESSURE: 67 MMHG | SYSTOLIC BLOOD PRESSURE: 123 MMHG | OXYGEN SATURATION: 100 %

## 2018-09-25 DIAGNOSIS — K64.4 EXTERNAL HEMORRHOIDS: Primary | ICD-10-CM

## 2018-09-25 DIAGNOSIS — L29.0 PRURITUS ANI: ICD-10-CM

## 2018-09-25 ASSESSMENT — PAIN SCALES - GENERAL: PAINLEVEL: NO PAIN (0)

## 2018-09-25 ASSESSMENT — ENCOUNTER SYMPTOMS
SHORTNESS OF BREATH: 1
FATIGUE: 1
DIARRHEA: 0
ALTERED TEMPERATURE REGULATION: 0
SPUTUM PRODUCTION: 0
DEPRESSION: 0
JAUNDICE: 0
TINGLING: 0
ABDOMINAL PAIN: 0
SKIN CHANGES: 0
DISTURBANCES IN COORDINATION: 0
DECREASED APPETITE: 0
NERVOUS/ANXIOUS: 1
VOMITING: 0
NUMBNESS: 0
INSOMNIA: 1
HEMOPTYSIS: 0
COUGH: 0
SNORES LOUDLY: 0
TREMORS: 0
NAUSEA: 0
DECREASED CONCENTRATION: 0
CHILLS: 1
SEIZURES: 0
WHEEZING: 0
DYSPNEA ON EXERTION: 0
WEAKNESS: 0
LOSS OF CONSCIOUSNESS: 0
HALLUCINATIONS: 0
FEVER: 0
POOR WOUND HEALING: 0
BLOATING: 1
WEIGHT LOSS: 0
RECTAL PAIN: 1
INCREASED ENERGY: 0
HEARTBURN: 1
MEMORY LOSS: 0
CONSTIPATION: 0
NAIL CHANGES: 0
COUGH DISTURBING SLEEP: 0
DIZZINESS: 0
NIGHT SWEATS: 1
POLYPHAGIA: 0
WEIGHT GAIN: 1
SPEECH CHANGE: 0
BLOOD IN STOOL: 0
POLYDIPSIA: 0
PANIC: 0
BOWEL INCONTINENCE: 0
PARALYSIS: 0
HEADACHES: 0
POSTURAL DYSPNEA: 0

## 2018-09-25 NOTE — NURSING NOTE
"Chief Complaint   Patient presents with     Consult     External hemorrhoids.       Vitals:    09/25/18 1459   BP: 123/67   Pulse: 60   Temp: 97.6  F (36.4  C)   TempSrc: Oral   SpO2: 100%   Weight: 177 lb 4.8 oz   Height: 5' 4\"       Body mass index is 30.43 kg/(m^2).      Micheal Saxena, EMT                      "

## 2018-09-25 NOTE — PROGRESS NOTES
"Health Psychology                                      Department of Medicine                                           [facility] Mail Code 747    Edwina Lara, Ph.D., L.P. [phone]  [address] [place] [address], [facility] Luzma Oleary, Ph.D.,  L.P. [phone]  [place] [address]  Med Hansen, Ph.D., A.B.P.P., L.P. [phone]       Joyce Huerta, PhD, LP [phone]  ________________________________________________________________________________________________  Health Psychology - Follow up Visit  Confidential Summary*    REFERRAL SOURCE  Self    CHIEF COMPLAINT/REASON FOR VISIT  Patient seen for session after experiencing increased anxiety and depressed mood following change in her job situation.        Subjective:  Patient began with report that she has returned to work and is noticing that she is more anxious than before she left for her leave of absence.  She commented that she is aware that she is \"sighing\".  Discussed the impact of breath holding on sighing and increasing anxiety.  She is in the practice of yoga and meditation and is soon to begin the [employer] 10 week course offerred by the [facility]. 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  She asked me to review her essays and shared her dedication to attend the particular program she is applying to because of their dedication to issues important to her.  She alternately described excitement at the possibilities of attending graduate school and anxiety at exposing herself and her diagnosis to others.  She has a long history of feeling judged by others when she came to the [place] because of her illness which contrasts with her " "upbringing in [place] by parents who refused to see her as someone with a \"disability\".  She longs to help others who may share the experience of discrimination.  Discussed how she might shift her cognitions to decrease her anxiety.      Objective:  Patient was on time for today s session, appropriately groomed and dressed, and demonstrated good eye contact.  She appeared alert and oriented.   Mood was anxious.  Patient adamantly denied suicidal or assaultive ideation, plan, or intent.       Assessment:  The patients is experiencing both depressive and anxious symptoms in response to job changes and recent grad school application. .      Plan: See in one week.    Time In: 3:30  Time Out::  4:30    Diagnosis:  Axis I adjustment disorder with anxious features   Axis II Deferred   Axis III Epilepsy, see medical record   Axis IV Psychosocial and Environmental Stressors: work related, school application     Joyce Huerta, Ph.D., L.P.      *In accordance with the Rules of the [place] Board of Psychology, it is noted that psychological descriptions and scientific procedures underlying psychological evaluations have limitations.  Absolute predictions cannot be made based on information in this report.       "

## 2018-09-25 NOTE — LETTER
2018       RE: Mellisa Thompson  [address]  [place] [address]     Dear Colleague,    Thank you for referring your patient, Mellisa Thompson, to the [facility] COLON AND RECTAL SURGERY at [facility]. Please see a copy of my visit note below.    Colon and Rectal Surgery Clinic Note    RE: Mellisa Thompson  : 1975  SOTO: 2018    Mellisa Thompson is a 42 year old woman who presents as a referral from Dr. Pozo for anal itching.  Patient was hospitalized from 2018 through 2018 for perforated viscus.  CT imaging completed at [facility] was suggestive of perforated distal small bowel vs sigmoid diverticular perforation with secondarily inflamed loops of small bowel.  Patient was treated with IV antibiotics and discharged home on 2 weeks of levofloxacin and Flagyl.  She followed up with GI and underwent a colonoscopy on  at which time she was noted to have internal hemorrhoids, a single small mouthed diverticulum in the rectosigmoid colon, and biopsies were taken of the distal ileum.  There is no evidence of diseased ileum.  Biopsies were without inflammation or malignancy.  Evaluated by infectious disease as patient had history of latent TB for which she underwent treatment for in .  Additional workup was negative.    Patient notes anal itching since undergoing colonoscopy in April that worsens with walking.  She does note intermittently having a mass at her anus ~1x per week that spontaneously reduces.  She has also noted new clear discharge in her underwear.  No recent dietary changes, but has appreciated a 12 pound weight gain recently.  She has daily bowel movements without constipation or diarrhea or changes in calbier.  Denies abdominal pain at this point, but does intermittently have bloating after eating ice cream.  She has recently changed jobs, now working as a  which requires significantly more  "sitting than her prior job.  Her new job has been more stressful.    Physical examination:  Examination was chaperoned by Micheal Saxena, EMT.    General: Alert, well-appearing female in no acute distress.  HEENT: Normocephalic, atraumatic. Patent nares.   Chest wall: Symmetric thorax.   Respiratory: Non-labored breathing.   Cardiovascular: Regular rate and rhythm.  Gastrointestinal: Abdomen soft, non-distended, non-tender to palpation.   Anorectal: Perianal area appears clean, dry, and without irritation. Digital rectal exam without mass. Anoscopy with small internal hemorrhoids, not ammendable to banding.  Extremities: Moving all four extremities. No limb deformities.       Vitals: /67  Pulse 60  Temp 97.6  F (36.4  C) (Oral)  Ht 5' 4\"  Wt 177 lb 4.8 oz  SpO2 100%  BMI 30.43 kg/m2  BMI= Body mass index is 30.43 kg/(m^2).    Plan  - MR Enterography  - Fiber supplementation with metamucil  - Cottonelle or preperation wipes for perianal cares to keep dry  - Desitin cream   - 1% Cortisone cream for several weeks to help alleviate itching  - RTC in 6 weeks with Francine Nuñez     Laboratory data:    Recent Labs   Lab Test  02/07/18   1114   WBC  7.1   HGB  12.1   PLT  265   CR  0.65   ALBUMIN  3.5   BILITOTAL  0.4   ALKPHOS  64   ALT  22   AST  16     CT Abd/Pelvis 1/26/18 (completed at [facility]).  Read per [facility] radiology:  \"-5-10cm of segmentally inflamed mid ileum, with small locules of ectopic gas in the immediate vicinity.  There is mild mucosal inflammation and underlying diverticulosis in the neighboring sigmoid colon.  Findings most likely represented perforated Crohn's disease with secondary inflammation of the sigmoid colon.  Less likely, this could represent perforated sigmoid diverticulitis with secondary inflammation of the small bowel.  No abscess or phlegmon detected.  -Calcified granuloma in the right lung base suggest prior exposure to histoplasma or tuberculosis.\"              For " details of past medical history, surgical history, family history, medications, allergies, and review of systems, please see details below.    Medical history:  Past Medical History:   Diagnosis Date     Anxiety      Crohn's disease (H) 01/2018     Depression      Epilepsy (H)    Perforated Viscus: January 2018    Surgical history:  Past Surgical History:   Procedure Laterality Date     ANKLE SURGERY Right 1999     BRAIN SURGERY  2007    right temporal lobectomy      COLONOSCOPY N/A 4/2/2018    Procedure: COMBINED COLONOSCOPY, SINGLE OR MULTIPLE BIOPSY/POLYPECTOMY BY BIOPSY;  colonoscopy;  Surgeon: Naomi Carrillo MD;  Location: [facility] OR       Family history:  Family History   Problem Relation Age of Onset     Hypertension Mother      Cancer Father      prostate     Cancer Paternal Grandfather      Diabetes Maternal Grandmother      Melanoma No family hx of      Skin Cancer No family hx of    Maternal uncle with colon cancer diagnosed in late 60s.    Medications:  Current Outpatient Prescriptions   Medication Sig Dispense Refill     Cholecalciferol (VITAMIN D) 2000 UNITS tablet Take 1 tablet by mouth daily Take one tablet daily. 90 tablet 3     levETIRAcetam 1000 MG TABS Take 1,000 mg by mouth 2 times daily 180 tablet 3     LORazepam (ATIVAN) 1 MG tablet Take 1 tablet (1 mg) by mouth every 8 hours as needed for anxiety (Patient not taking: Reported on 9/25/2018) 20 tablet 1     tacrolimus (PROTOPIC) 0.1 % ointment Apply topically 2 times daily (Patient not taking: Reported on 9/25/2018) 60 g 3       Allergies:  The patienthas No Known Allergies.    Social history:  Social History   Substance Use Topics     Smoking status: Never Smoker     Smokeless tobacco: Never Used     Alcohol use 0.0 oz/week     0 Standard drinks or equivalent per week      Comment: socially     Marital status: .    Review of Systems:  Nursing Notes:   Micheal Saxena CMA  9/25/2018  3:02 PM  Signed  Chief Complaint   Patient presents  "with     Consult     External hemorrhoids.       Vitals:    09/25/18 1459   BP: 123/67   Pulse: 60   Temp: 97.6  F (36.4  C)   TempSrc: Oral   SpO2: 100%   Weight: 177 lb 4.8 oz   Height: 5' 4\"       Body mass index is 30.43 kg/(m^2).      Micheal Saxena, EMT                       Vladimir Henson MD   PGY-3, GEN SURG      I saw and examined the patient, led the discussion and edited the resident note.  I agree with the assessment and plan as outlined.  The patient has an benign anorectal examination and no continence issues so we will treat her with conventional pruritus and I recommendations.  She has no bowel symptoms and her terminal ileum looks normal, but as the area of small bowel in question was in the mid small bowel, I think it is worth obtaining an MR enterogram to be certain there is no primary small bowel pathology.    I answered all the patient's questions to her stated satisfaction.  She expressed her understanding and agreement with the proposed plan.  She will follow-up with Francine Norris NP in 6 weeks time.  I did discuss the case with Dr. Naomi Pozo, who consulted on the patient previously.    Total time 30 minutes.  Greater than half the time was spent counseling.    Michael Baker MD  Professor of Surgery    Referring Provider:  Referred Self, MD  No address on file     Primary Care Provider:  Ree Michele      "

## 2018-09-25 NOTE — PROGRESS NOTES
"Colon and Rectal Surgery Clinic Note      RE: Mellisa Thompson  : 1975  SOTO: 2018    ***      Physical examination:  Examination was chaperoned by ***.     Vitals: /67  Pulse 60  Temp 97.6  F (36.4  C) (Oral)  Ht 5' 4\"  Wt 177 lb 4.8 oz  SpO2 100%  BMI 30.43 kg/m2  BMI= Body mass index is 30.43 kg/(m^2).    ***    Laboratory data:    Recent Labs   Lab Test  18   1114   WBC  7.1   HGB  12.1   PLT  265   CR  0.65   ALBUMIN  3.5   BILITOTAL  0.4   ALKPHOS  64   ALT  22   AST  16       Assessment/plan:  ***      For details of past medical history, surgical history, family history, medications, allergies, and review of systems, please see details below.    Medical history:  Past Medical History:   Diagnosis Date     Anxiety      Crohn's disease (H) 2018     Depression      Epilepsy (H)        Surgical history:  Past Surgical History:   Procedure Laterality Date     ANKLE SURGERY Right 1999     BRAIN SURGERY  2007    right temporal lobectomy      COLONOSCOPY N/A 2018    Procedure: COMBINED COLONOSCOPY, SINGLE OR MULTIPLE BIOPSY/POLYPECTOMY BY BIOPSY;  colonoscopy;  Surgeon: Naomi Carrillo MD;  Location: [facility] OR       Family history:  Family History   Problem Relation Age of Onset     Hypertension Mother      Cancer Father      prostate     Cancer Paternal Grandfather      Diabetes Maternal Grandmother      Melanoma No family hx of      Skin Cancer No family hx of        Medications:  Current Outpatient Prescriptions   Medication Sig Dispense Refill     Cholecalciferol (VITAMIN D) 2000 UNITS tablet Take 1 tablet by mouth daily Take one tablet daily. 90 tablet 3     levETIRAcetam 1000 MG TABS Take 1,000 mg by mouth 2 times daily 180 tablet 3     LORazepam (ATIVAN) 1 MG tablet Take 1 tablet (1 mg) by mouth every 8 hours as needed for anxiety (Patient not taking: Reported on 2018) 20 tablet 1     tacrolimus (PROTOPIC) 0.1 % ointment Apply topically 2 times daily (Patient not taking: " "Reported on 9/25/2018) 60 g 3       Allergies:  The patienthas No Known Allergies.    Social history:  Social History   Substance Use Topics     Smoking status: Never Smoker     Smokeless tobacco: Never Used     Alcohol use 0.0 oz/week     0 Standard drinks or equivalent per week      Comment: socially     Marital status: .    Review of Systems:  Nursing Notes:   Micheal Saxena CMA  9/25/2018  3:02 PM  Signed  Chief Complaint   Patient presents with     Consult     External hemorrhoids.       Vitals:    09/25/18 1459   BP: 123/67   Pulse: 60   Temp: 97.6  F (36.4  C)   TempSrc: Oral   SpO2: 100%   Weight: 177 lb 4.8 oz   Height: 5' 4\"       Body mass index is 30.43 kg/(m^2).      Micheal Saxena, EMT                             Michael Baker MD   Professor and Chief  Division of Colon and Rectal Surgery  [facility]      Referring Provider:  Referred Self, MD  No address on file     Primary Care Provider:  Ree Michele    Answers for HPI/ROS submitted by the patient on 9/25/2018   General Symptoms: Yes  Skin Symptoms: Yes  HENT Symptoms: No  EYE SYMPTOMS: No  HEART SYMPTOMS: No  LUNG SYMPTOMS: Yes  INTESTINAL SYMPTOMS: Yes  URINARY SYMPTOMS: No  GYNECOLOGIC SYMPTOMS: No  BREAST SYMPTOMS: No  SKELETAL SYMPTOMS: No  BLOOD SYMPTOMS: No  NERVOUS SYSTEM SYMPTOMS: Yes  MENTAL HEALTH SYMPTOMS: Yes  Fever: No  Loss of appetite: No  Weight loss: No  Weight gain: Yes  Fatigue: Yes  Night sweats: Yes  Chills: Yes  Increased stress: Yes  Excessive hunger: No  Excessive thirst: No  Feeling hot or cold when others believe the temperature is normal: No  Loss of height: No  Post-operative complications: No  Surgical site pain: No  Hallucinations: No  Change in or Loss of Energy: No  Hyperactivity: No  Confusion: No  Changes in hair: Yes  Changes in moles/birth marks: No  Itching: No  Rashes: No  Changes in nails: No  Acne: Yes  Hair in places you don't want it: No  Change in facial " hair: No  Warts: No  Non-healing sores: No  Scarring: No  Flaking of skin: No  Cough: No  Sputum or phlegm: No  Coughing up blood: No  Difficulty breating or shortness of breath: Yes  Snoring: No  Wheezing: No  Difficulty breathing on exertion: No  Nighttime Cough: No  Difficulty breathing when lying flat: No  Heart burn or indigestion: Yes  Nausea: No  Vomiting: No  Abdominal pain: No  Bloating: Yes  Constipation: No  Diarrhea: No  Blood in stool: No  Black stools: No  Rectal or Anal pain: Yes  Fecal incontinence: No  Yellowing of skin or eyes: No  Vomit with blood: No  Change in stools: No  Trouble with coordination: No  Dizziness or trouble with balance: No  Fainting or black-out spells: No  Memory loss: No  Headache: No  Seizures: No  Speech problems: No  Tingling: No  Tremor: No  Weakness: No  Difficulty walking: No  Paralysis: No  Numbness: No  Nervous or Anxious: Yes  Depression: No  Trouble sleeping: Yes  Trouble thinking or concentrating: No  Mood changes: No  Panic attacks: No

## 2018-09-25 NOTE — PROGRESS NOTES
Colon and Rectal Surgery Clinic Note    RE: Mellisa Thompson  : 1975  SOTO: 2018    Mellisa Thompson is a 42 year old woman who presents as a referral from Dr. Pozo for anal itching.  Patient was hospitalized from 2018 through 2018 for perforated viscus.  CT imaging completed at [facility] was suggestive of perforated distal small bowel vs sigmoid diverticular perforation with secondarily inflamed loops of small bowel.  Patient was treated with IV antibiotics and discharged home on 2 weeks of levofloxacin and Flagyl.  She followed up with GI and underwent a colonoscopy on  at which time she was noted to have internal hemorrhoids, a single small mouthed diverticulum in the rectosigmoid colon, and biopsies were taken of the distal ileum.  There is no evidence of diseased ileum.  Biopsies were without inflammation or malignancy.  Evaluated by infectious disease as patient had history of latent TB for which she underwent treatment for in .  Additional workup was negative.    Patient notes anal itching since undergoing colonoscopy in April that worsens with walking.  She does note intermittently having a mass at her anus ~1x per week that spontaneously reduces.  She has also noted new clear discharge in her underwear.  No recent dietary changes, but has appreciated a 12 pound weight gain recently.  She has daily bowel movements without constipation or diarrhea or changes in calbier.  Denies abdominal pain at this point, but does intermittently have bloating after eating ice cream.  She has recently changed jobs, now working as a  which requires significantly more sitting than her prior job.  Her new job has been more stressful.    Physical examination:  Examination was chaperoned by Micheal Saxena, EMT.    General: Alert, well-appearing female in no acute distress.  HEENT: Normocephalic, atraumatic. Patent nares.   Chest wall: Symmetric thorax.  "  Respiratory: Non-labored breathing.   Cardiovascular: Regular rate and rhythm.  Gastrointestinal: Abdomen soft, non-distended, non-tender to palpation.   Anorectal: Perianal area appears clean, dry, and without irritation. Digital rectal exam without mass. Anoscopy with small internal hemorrhoids, not ammendable to banding.  Extremities: Moving all four extremities. No limb deformities.       Vitals: /67  Pulse 60  Temp 97.6  F (36.4  C) (Oral)  Ht 5' 4\"  Wt 177 lb 4.8 oz  SpO2 100%  BMI 30.43 kg/m2  BMI= Body mass index is 30.43 kg/(m^2).    Plan  - MR Enterography  - Fiber supplementation with metamucil  - Cottonelle or preperation wipes for perianal cares to keep dry  - Desitin cream   - 1% Cortisone cream for several weeks to help alleviate itching  - RTC in 6 weeks with Francine Nuñez     Laboratory data:    Recent Labs   Lab Test  02/07/18   1114   WBC  7.1   HGB  12.1   PLT  265   CR  0.65   ALBUMIN  3.5   BILITOTAL  0.4   ALKPHOS  64   ALT  22   AST  16     CT Abd/Pelvis 1/26/18 (completed at [facility]).  Read per [facility] radiology:  \"-5-10cm of segmentally inflamed mid ileum, with small locules of ectopic gas in the immediate vicinity.  There is mild mucosal inflammation and underlying diverticulosis in the neighboring sigmoid colon.  Findings most likely represented perforated Crohn's disease with secondary inflammation of the sigmoid colon.  Less likely, this could represent perforated sigmoid diverticulitis with secondary inflammation of the small bowel.  No abscess or phlegmon detected.  -Calcified granuloma in the right lung base suggest prior exposure to histoplasma or tuberculosis.\"              For details of past medical history, surgical history, family history, medications, allergies, and review of systems, please see details below.    Medical history:  Past Medical History:   Diagnosis Date     Anxiety      Crohn's disease (H) 01/2018     Depression      Epilepsy (H)    Perforated " "Viscus: January 2018    Surgical history:  Past Surgical History:   Procedure Laterality Date     ANKLE SURGERY Right 1999     BRAIN SURGERY  2007    right temporal lobectomy      COLONOSCOPY N/A 4/2/2018    Procedure: COMBINED COLONOSCOPY, SINGLE OR MULTIPLE BIOPSY/POLYPECTOMY BY BIOPSY;  colonoscopy;  Surgeon: Naomi Carrillo MD;  Location: [facility] OR       Family history:  Family History   Problem Relation Age of Onset     Hypertension Mother      Cancer Father      prostate     Cancer Paternal Grandfather      Diabetes Maternal Grandmother      Melanoma No family hx of      Skin Cancer No family hx of    Maternal uncle with colon cancer diagnosed in late 60s.    Medications:  Current Outpatient Prescriptions   Medication Sig Dispense Refill     Cholecalciferol (VITAMIN D) 2000 UNITS tablet Take 1 tablet by mouth daily Take one tablet daily. 90 tablet 3     levETIRAcetam 1000 MG TABS Take 1,000 mg by mouth 2 times daily 180 tablet 3     LORazepam (ATIVAN) 1 MG tablet Take 1 tablet (1 mg) by mouth every 8 hours as needed for anxiety (Patient not taking: Reported on 9/25/2018) 20 tablet 1     tacrolimus (PROTOPIC) 0.1 % ointment Apply topically 2 times daily (Patient not taking: Reported on 9/25/2018) 60 g 3       Allergies:  The patienthas No Known Allergies.    Social history:  Social History   Substance Use Topics     Smoking status: Never Smoker     Smokeless tobacco: Never Used     Alcohol use 0.0 oz/week     0 Standard drinks or equivalent per week      Comment: socially     Marital status: .    Review of Systems:  Nursing Notes:   Micheal Saxena CMA  9/25/2018  3:02 PM  Signed  Chief Complaint   Patient presents with     Consult     External hemorrhoids.       Vitals:    09/25/18 1459   BP: 123/67   Pulse: 60   Temp: 97.6  F (36.4  C)   TempSrc: Oral   SpO2: 100%   Weight: 177 lb 4.8 oz   Height: 5' 4\"       Body mass index is 30.43 kg/(m^2).      NOY Ornelas " MD Sixto   PGY-3, GEN SURG      I saw and examined the patient, led the discussion and edited the resident note.  I agree with the assessment and plan as outlined.  The patient has an benign anorectal examination and no continence issues so we will treat her with conventional pruritus and I recommendations.  She has no bowel symptoms and her terminal ileum looks normal, but as the area of small bowel in question was in the mid small bowel, I think it is worth obtaining an MR enterogram to be certain there is no primary small bowel pathology.    I answered all the patient's questions to her stated satisfaction.  She expressed her understanding and agreement with the proposed plan.  She will follow-up with Francine Norris NP in 6 weeks time.  I did discuss the case with Dr. Naomi Pozo, who consulted on the patient previously.    Total time 30 minutes.  Greater than half the time was spent counseling.    Michael Baker MD  Professor of Surgery    Referring Provider:  Referred Self, MD  No address on file     Primary Care Provider:  Ree Michele      Answers for HPI/ROS submitted by the patient on 9/25/2018   General Symptoms: Yes  Skin Symptoms: Yes  HENT Symptoms: No  EYE SYMPTOMS: No  HEART SYMPTOMS: No  LUNG SYMPTOMS: Yes  INTESTINAL SYMPTOMS: Yes  URINARY SYMPTOMS: No  GYNECOLOGIC SYMPTOMS: No  BREAST SYMPTOMS: No  SKELETAL SYMPTOMS: No  BLOOD SYMPTOMS: No  NERVOUS SYSTEM SYMPTOMS: Yes  MENTAL HEALTH SYMPTOMS: Yes  Fever: No  Loss of appetite: No  Weight loss: No  Weight gain: Yes  Fatigue: Yes  Night sweats: Yes  Chills: Yes  Increased stress: Yes  Excessive hunger: No  Excessive thirst: No  Feeling hot or cold when others believe the temperature is normal: No  Loss of height: No  Post-operative complications: No  Surgical site pain: No  Hallucinations: No  Change in or Loss of Energy: No  Hyperactivity: No  Confusion: No  Changes in hair: Yes  Changes in moles/birth marks:  No  Itching: No  Rashes: No  Changes in nails: No  Acne: Yes  Hair in places you don't want it: No  Change in facial hair: No  Warts: No  Non-healing sores: No  Scarring: No  Flaking of skin: No  Cough: No  Sputum or phlegm: No  Coughing up blood: No  Difficulty breating or shortness of breath: Yes  Snoring: No  Wheezing: No  Difficulty breathing on exertion: No  Nighttime Cough: No  Difficulty breathing when lying flat: No  Heart burn or indigestion: Yes  Nausea: No  Vomiting: No  Abdominal pain: No  Bloating: Yes  Constipation: No  Diarrhea: No  Blood in stool: No  Black stools: No  Rectal or Anal pain: Yes  Fecal incontinence: No  Yellowing of skin or eyes: No  Vomit with blood: No  Change in stools: No  Trouble with coordination: No  Dizziness or trouble with balance: No  Fainting or black-out spells: No  Memory loss: No  Headache: No  Seizures: No  Speech problems: No  Tingling: No  Tremor: No  Weakness: No  Difficulty walking: No  Paralysis: No  Numbness: No  Nervous or Anxious: Yes  Depression: No  Trouble sleeping: Yes  Trouble thinking or concentrating: No  Mood changes: No  Panic attacks: No

## 2018-09-25 NOTE — MR AVS SNAPSHOT
After Visit Summary   9/25/2018    Mellisa Thompson    MRN: [id number]           Patient Information     Date Of Birth          1975        Visit Information        Provider Department      9/25/2018 3:00 PM Michael Baker MD [facility] Colon [facility] Rectal Surgery        Today's Diagnoses     External hemorrhoids    -  1      Care Instructions    Follow up:  1. Schedule MRE.  2. Use Prep-H wipes.  3. Metamucil daily.  4. Follow up with rFancine in 6 weeks.  5. Prep-H cream    Please call with any questions or concerns regarding your clinic visit today.    It is a pleasure being involved in your health care.    Contacts post-consultation depending on your need:    Radiology Appointments                [phone]    Schedule Clinic Appointments       [phone] # 1   M-F 7:30 - 5 pm    Latonia HINTON RN Coordinator           [phone]    Clinic Fax Number          [phone]    Cora           [phone]    My Chart is available 24 hours a day and is a secure way to access your records and communicate with your care team.  I strongly recommend signing up if you haven't already done so, if you are comfortable with computers.  If you would like to inquire about this or are having problems with My Chart access, you may call [phone] or go online at [email or web address].  Please allow at least 24 hours for a response and extra time on weekends and Holidays.              Follow-ups after your visit        Your next 10 appointments already scheduled     Sep 27, 2018  8:00 AM CDT   (Arrive by 7:45 AM)   Return Visit with Joyce Huerta, PhD [facility] ([facility])    [address] [address] [phone]            Sep 27, 2018  3:30 PM CDT   Office Visit with Mario Gibbs MD   [facility] ([facility])    [address] [place] [address]  "[phone]           Bring a current list of meds and any records pertaining to this visit. For Physicals, please bring immunization records and any forms needing to be filled out. Please arrive 10 minutes early to complete paperwork.              Future tests that were ordered for you today     Open Future Orders        Priority Expected Expires Ordered    MR Enterography w Contrast Routine  9/25/2019 9/25/2018            Who to contact     Please call your clinic at [phone] to:    Ask questions about your health    Make or cancel appointments    Discuss your medicines    Learn about your test results    Speak to your doctor            Additional Information About Your Visit        appening Information     appening gives you secure access to your electronic health record. If you see a primary care provider, you can also send messages to your care team and make appointments. If you have questions, please call your primary care clinic.  If you do not have a primary care provider, please call [phone] and they will assist you.      appening is an electronic gateway that provides easy, online access to your medical records. With appening, you can request a clinic appointment, read your test results, renew a prescription or communicate with your care team.     To access your existing account, please contact your [facility] or call [phone] for assistance.        Care EveryWhere ID     This is your Care EveryWhere ID. This could be used by other organizations to access your [place] medical records  [id number]        Your Vitals Were     Pulse Temperature Height Pulse Oximetry BMI (Body Mass Index)       60 97.6  F (36.4  C) (Oral) 5' 4\" 100% 30.43 kg/m2        Blood Pressure from Last 3 Encounters:   09/25/18 123/67   08/17/18 117/69   06/19/18 107/62    Weight from Last 3 Encounters:   09/25/18 177 lb 4.8 oz   08/17/18 177 lb 4 oz   06/19/18 177 lb 3.2 oz         "       Primary Care Provider Office Phone # Fax #    Ree Michele [phone]       [address] [facility] [address]  [facility] [address]        Equal Access to Services     LUIS M ROSAS : Hadii aad ku hadtiff Garnett, wacaterinada luqadaha, qaybta kacarolineda cindy, rosio rossa froilanmontez boone tessa dominguez. So [facility] [phone].    ATENCIÓN: Si habla español, tiene a farah disposición servicios gratuitos de asistencia lingüística. Llame al [phone].    We comply with applicable federal civil rights laws and [place] laws. We do not discriminate on the basis of race, color, national origin, age, disability, sex, sexual orientation, or gender identity.            Thank you!     Thank you for choosing [facility] COLON AND RECTAL SURGERY  for your care. Our goal is always to provide you with excellent care. Hearing back from our patients is one way we can continue to improve our services. Please take a few minutes to complete the written survey that you may receive in the mail after your visit with us. Thank you!             Your Updated Medication List - Protect others around you: Learn how to safely use, store and throw away your medicines at www.disposemymeds.org.          This list is accurate as of 9/25/18  5:55 PM.  Always use your most recent med list.                   Brand Name Dispense Instructions for use Diagnosis    levETIRAcetam 1000 MG Tabs     180 tablet    Take 1,000 mg by mouth 2 times daily    Convulsions, unspecified convulsion type (H)       LORazepam 1 MG tablet    ATIVAN    20 tablet    Take 1 tablet (1 mg) by mouth every 8 hours as needed for anxiety    Tension headache       tacrolimus 0.1 % ointment    PROTOPIC    60 g    Apply topically 2 times daily    Dermatitis       vitamin D 2000 units tablet     90 tablet    Take 1 tablet by mouth daily Take one tablet daily.    Vitamin D deficiency

## 2018-09-25 NOTE — PATIENT INSTRUCTIONS
Follow up:  1. Schedule MRE.  2. Use Prep-H wipes.  3. Metamucil daily.  4. Follow up with Francine in 6 weeks.  5. Prep-H cream    Please call with any questions or concerns regarding your clinic visit today.    It is a pleasure being involved in your health care.    Contacts post-consultation depending on your need:    Radiology Appointments                [phone]    Schedule Clinic Appointments       [phone] # 1   M-F 7:30 - 5 pm    Latonia HINTON RN Coordinator           [phone]    Clinic Fax Number          [phone]    Cora           [phone]    My Chart is available 24 hours a day and is a secure way to access your records and communicate with your care team.  I strongly recommend signing up if you haven't already done so, if you are comfortable with computers.  If you would like to inquire about this or are having problems with My Chart access, you may call [phone] or go online at [email or web address].  Please allow at least 24 hours for a response and extra time on weekends and Holidays.

## 2018-09-27 ENCOUNTER — OFFICE VISIT (OUTPATIENT)
Dept: PSYCHOLOGY | Facility: CLINIC | Age: 43
End: 2018-09-27
Payer: COMMERCIAL

## 2018-09-27 ENCOUNTER — OFFICE VISIT (OUTPATIENT)
Dept: OBGYN | Facility: CLINIC | Age: 43
End: 2018-09-27
Payer: COMMERCIAL

## 2018-09-27 VITALS
HEART RATE: 64 BPM | BODY MASS INDEX: 31.01 KG/M2 | WEIGHT: 175 LBS | DIASTOLIC BLOOD PRESSURE: 70 MMHG | HEIGHT: 63 IN | SYSTOLIC BLOOD PRESSURE: 110 MMHG

## 2018-09-27 DIAGNOSIS — F43.23 ADJUSTMENT DISORDER WITH MIXED ANXIETY AND DEPRESSED MOOD: Primary | ICD-10-CM

## 2018-09-27 DIAGNOSIS — Z01.419 ENCOUNTER FOR GYNECOLOGICAL EXAMINATION WITHOUT ABNORMAL FINDING: Primary | ICD-10-CM

## 2018-09-27 DIAGNOSIS — Z11.3 SCREEN FOR STD (SEXUALLY TRANSMITTED DISEASE): ICD-10-CM

## 2018-09-27 DIAGNOSIS — Z11.8 SCREENING FOR CHLAMYDIAL DISEASE: ICD-10-CM

## 2018-09-27 PROCEDURE — 99396 PREV VISIT EST AGE 40-64: CPT | Performed by: OBSTETRICS & GYNECOLOGY

## 2018-09-27 PROCEDURE — 87591 N.GONORRHOEAE DNA AMP PROB: CPT | Performed by: OBSTETRICS & GYNECOLOGY

## 2018-09-27 PROCEDURE — 87491 CHLMYD TRACH DNA AMP PROBE: CPT | Performed by: OBSTETRICS & GYNECOLOGY

## 2018-09-27 ASSESSMENT — PATIENT HEALTH QUESTIONNAIRE - PHQ9: 5. POOR APPETITE OR OVEREATING: NOT AT ALL

## 2018-09-27 ASSESSMENT — ANXIETY QUESTIONNAIRES
IF YOU CHECKED OFF ANY PROBLEMS ON THIS QUESTIONNAIRE, HOW DIFFICULT HAVE THESE PROBLEMS MADE IT FOR YOU TO DO YOUR WORK, TAKE CARE OF THINGS AT HOME, OR GET ALONG WITH OTHER PEOPLE: SOMEWHAT DIFFICULT
2. NOT BEING ABLE TO STOP OR CONTROL WORRYING: NOT AT ALL
7. FEELING AFRAID AS IF SOMETHING AWFUL MIGHT HAPPEN: NOT AT ALL
3. WORRYING TOO MUCH ABOUT DIFFERENT THINGS: SEVERAL DAYS
6. BECOMING EASILY ANNOYED OR IRRITABLE: NOT AT ALL
GAD7 TOTAL SCORE: 2
5. BEING SO RESTLESS THAT IT IS HARD TO SIT STILL: NOT AT ALL
1. FEELING NERVOUS, ANXIOUS, OR ON EDGE: SEVERAL DAYS

## 2018-09-27 NOTE — PROGRESS NOTES
"    SUBJECTIVE:                                                   Mellisa Thompson is a 42 year old female who presents to clinic today for the following health issue(s):  Patient presents with:  Fertility      Additional information: ***    HPI:  ***    No LMP recorded..   Patient {is/is not:336681::\"is\"} sexually active, .  Using {[facility] CONTRACEPTION:014669} for contraception.    reports that she has never smoked. She has never used smokeless tobacco.  {Tobacco Cessation -- Delete if patient is a non-smoker:944822}  STD testing offered?  {PLC GC/CHLAMYDIA:834788}    Health maintenance updated:  {yes no:670417}    Today's PHQ-2 Score:   PHQ-2 (  [employer]) 2018   Q1: Little interest or pleasure in doing things 0   Q2: Feeling down, depressed or hopeless 0   PHQ-2 Score 0     Today's PHQ-9 Score: No flowsheet data found.  Today's QI-7 Score: No flowsheet data found.    Problem list and histories reviewed & adjusted, as indicated.  Additional history: as documented.    Patient Active Problem List   Diagnosis     Convulsions (H)     Sinus tachycardia     Adjustment disorder with mixed anxiety and depressed mood     Seasonal affective disorder (H)     Major depressive disorder, recurrent episode, mild with seasonal pattern (H)     Perforated bowel (H)     Past Surgical History:   Procedure Laterality Date     ANKLE SURGERY Right      BRAIN SURGERY  2007    right temporal lobectomy      COLONOSCOPY N/A 2018    Procedure: COMBINED COLONOSCOPY, SINGLE OR MULTIPLE BIOPSY/POLYPECTOMY BY BIOPSY;  colonoscopy;  Surgeon: Naomi Carrillo MD;  Location:  OR      Social History   Substance Use Topics     Smoking status: Never Smoker     Smokeless tobacco: Never Used     Alcohol use 0.0 oz/week     0 Standard drinks or equivalent per week      Comment: socially      Problem (# of Occurrences) Relation (Name,Age of Onset)    Cancer (2) Father: prostate, Paternal Grandfather    Diabetes (1) Maternal Grandmother " "   Hypertension (1) Mother       Negative family history of: Melanoma, Skin Cancer            Current Outpatient Prescriptions   Medication Sig     Cholecalciferol (VITAMIN D) 2000 UNITS tablet Take 1 tablet by mouth daily Take one tablet daily.     levETIRAcetam 1000 MG TABS Take 1,000 mg by mouth 2 times daily     LORazepam (ATIVAN) 1 MG tablet Take 1 tablet (1 mg) by mouth every 8 hours as needed for anxiety (Patient not taking: Reported on 9/25/2018)     tacrolimus (PROTOPIC) 0.1 % ointment Apply topically 2 times daily (Patient not taking: Reported on 9/25/2018)     No current facility-administered medications for this visit.      No Known Allergies    ROS:  {[facility] ROSGYN:289778::\"12 point review of systems negative other than symptoms noted below.\"}    OBJECTIVE:     There were no vitals taken for this visit.  There is no height or weight on file to calculate BMI.    Exam:  {[facility] EXAM CHOICES:328178}     In-Clinic Test Results:  No results found for this or any previous visit (from the past 24 hour(s)).    ASSESSMENT/PLAN:                                                      No diagnosis found.    There are no Patient Instructions on file for this visit.    ***    Mario Gibbs MD  [facility]  "

## 2018-09-27 NOTE — PROGRESS NOTES
Mellisa is a 42 year old  female who presents for annual exam.     Besides routine health maintenance,  she would like to discuss infertility.    HPI:  The patient's PCP is  Ree Michele MD.    Wanting to discuss infertility. No birth control for many years. Infrequent intercourse. NO pain with intercourse.   Regular cycles. No issues.  Hx of possible ruptured viscous in . Pt has Crohn's disease.   Hx of Temporal lobe removal for epilepsy.        GYNECOLOGIC HISTORY:    Patient's last menstrual period was 2018.  Her current contraception method is: none.  She  reports that she has never smoked. She has never used smokeless tobacco.    Patient is sexually active.  STD testing offered?  Accepted  Last PHQ-9 score on record =   PHQ-9 SCORE 2018   Total Score 1   Some encounter information is confidential and restricted. Go to Review Flowsheets activity to see all data.     Last GAD7 score on record =   QI-7 SCORE 2018   Total Score 2   Some encounter information is confidential and restricted. Go to Review Flowsheets activity to see all data.     Alcohol Score = 2    HEALTH MAINTENANCE:  Cholesterol: 17   Total= 183, Triglycerides=74, HDL=54, LNY=269, FBS=89, TSH=0.90   Last Mammo: 18, Result: normal, Next Mammo: next year  Pap: HPV: Negative  Lab Results   Component Value Date    PAP NIL 2016    PAP NIL 2011    PAP NIL 2010   Colonoscopy:  16, Result: Diverticulosis, Next Colonoscopy: age 48.  Dexa:  never    Health maintenance updated:  yes    HISTORY:  Obstetric History       T0      L0     SAB0   TAB0   Ectopic0   Multiple0   Live Births0           Patient Active Problem List   Diagnosis     Convulsions (H)     Sinus tachycardia     Adjustment disorder with mixed anxiety and depressed mood     Seasonal affective disorder (H)     Major depressive disorder, recurrent episode, mild with seasonal pattern (H)     Perforated bowel (H)     Past  "Surgical History:   Procedure Laterality Date     ANKLE SURGERY Right 1999     BRAIN SURGERY  2007    right temporal lobectomy      COLONOSCOPY N/A 4/2/2018    Procedure: COMBINED COLONOSCOPY, SINGLE OR MULTIPLE BIOPSY/POLYPECTOMY BY BIOPSY;  colonoscopy;  Surgeon: Naomi Carrillo MD;  Location:  OR      Social History   Substance Use Topics     Smoking status: Never Smoker     Smokeless tobacco: Never Used     Alcohol use 0.0 oz/week     0 Standard drinks or equivalent per week      Comment: socially      Problem (# of Occurrences) Relation (Name,Age of Onset)    Cancer (2) Father: prostate, Paternal Grandfather    Colon Cancer (1) Maternal Uncle    Diabetes (1) Maternal Grandmother    Hyperlipidemia (1) Mother    Hypertension (1) Mother    Ovarian Cancer (1) Paternal Aunt       Negative family history of: Melanoma, Skin Cancer            Current Outpatient Prescriptions   Medication Sig     Cholecalciferol (VITAMIN D) 2000 UNITS tablet Take 1 tablet by mouth daily Take one tablet daily.     levETIRAcetam 1000 MG TABS Take 1,000 mg by mouth 2 times daily     tacrolimus (PROTOPIC) 0.1 % ointment Apply topically 2 times daily     No current facility-administered medications for this visit.      No Known Allergies    Past medical, surgical, social and family histories were reviewed and updated in EPIC.    ROS:   12 point review of systems negative other than symptoms noted below.  Constitutional: Weight Gain  Gastrointestinal: Heartburn  Endocrine: Loss of Hair  Psychiatric: Anxiety    EXAM:  /70  Pulse 64  Ht 5' 3.25\" (1.607 m)  Wt 175 lb (79.4 kg)  LMP 08/23/2018  Breastfeeding? No  BMI 30.76 kg/m2   BMI: Body mass index is 30.76 kg/(m^2).    PHYSICAL EXAM:  Constitutional:  Appearance: Well nourished, well developed, alert, in no acute distress  Neck:  Lymph Nodes:  No lymphadenopathy present    Thyroid:  Gland size normal, nontender, no nodules or masses present  on palpation  Chest:  Respiratory " Effort:  Breathing unlabored  Cardiovascular:    Heart: Auscultation:  Regular rate, normal rhythm, no murmurs present  Breasts: Inspection of Breasts:  No lymphadenopathy present., Palpation of Breasts and Axillae:  No masses present on palpation, no breast tenderness., Axillary Lymph Nodes:  No lymphadenopathy present. and No nodularity, asymmetry or nipple discharge bilaterally.  Gastrointestinal:   Abdominal Examination:  Abdomen nontender to palpation, tone normal without rigidity or guarding, no masses present, umbilicus without lesions   Liver and Spleen:  No hepatomegaly present, liver nontender to palpation    Hernias:  No hernias present  Lymphatic: Lymph Nodes:  No other lymphadenopathy present  Skin:  General Inspection:  No rashes present, no lesions present, no areas of  discoloration    Genitalia and Groin:  No rashes present, no lesions present, no areas of  discoloration, no masses present  Neurologic/Psychiatric:    Mental Status:  Oriented X3     Pelvic Exam:  External Genitalia:     Normal appearance for age, no discharge present, no tenderness present, no inflammatory lesions present, color normal  Vagina:     Normal vaginal vault without central or paravaginal defects, no discharge present, no inflammatory lesions present, no masses present  Bladder:     Nontender to palpation  Urethra:   Urethral Body:  Urethra palpation normal, urethra structural support normal   Urethral Meatus:  No erythema or lesions present  Cervix:     Appearance healthy, no lesions present, nontender to palpation, no bleeding present  Uterus:     Uterus: firm, normal sized and nontender, retroverted in position.   Adnexa:     No adnexal tenderness present, no adnexal masses present  Perineum:     Perineum within normal limits, no evidence of trauma, no rashes or skin lesions present  Anus:     Anus within normal limits, no hemorrhoids present  Inguinal Lymph Nodes:     No lymphadenopathy present  Pubic Hair:     Normal  pubic hair distribution for age  Genitalia and Groin:     No rashes present, no lesions present, no areas of discoloration, no masses present      COUNSELING:   Reviewed preventive health counseling, as reflected in patient instructions       Regular exercise    BMI: Body mass index is 30.76 kg/(m^2).  Weight management plan: Patient was referred to their PCP to discuss a diet and exercise plan.    ASSESSMENT:  42 year old female with satisfactory annual exam.    ICD-10-CM    1. Encounter for gynecological examination without abnormal finding Z01.419    2. Screen for STD (sexually transmitted disease) Z11.3 NEISSERIA GONORRHOEA PCR   3. Screening for chlamydial disease Z11.8 CHLAMYDIA TRACHOMATIS PCR       PLAN:  Explained that age is a significant factor in fertility. Due to age, Clomid is not recommended. Discussed IVF with donor egg or donor embryo. May need HSG after hospitalization in Jan.  Mario Gibbs MD

## 2018-09-27 NOTE — MR AVS SNAPSHOT
After Visit Summary   9/27/2018    Mellisa Thompson    MRN: [id number]           Patient Information     Date Of Birth          1975        Visit Information        Provider Department      9/27/2018 8:00 AM Joyce Huerta, PhD [facility]        Today's Diagnoses     Adjustment disorder with mixed anxiety and depressed mood    -  1       Follow-ups after your visit        Your next 10 appointments already scheduled     Oct 04, 2018  3:00 PM CDT   (Arrive by 2:45 PM)   Return Visit with Joyce Huerta, PhD NIYAH   [facility] ([facility])    [address] [place] [address] [phone]              Who to contact     Please call your clinic at [phone] to:    Ask questions about your health    Make or cancel appointments    Discuss your medicines    Learn about your test results    Speak to your doctor            Additional Information About Your Visit        MyChart Information     SNAPP't gives you secure access to your electronic health record. If you see a primary care provider, you can also send messages to your care team and make appointments. If you have questions, please call your primary care clinic.  If you do not have a primary care provider, please call [phone] and they will assist you.      SmarterShade is an electronic gateway that provides easy, online access to your medical records. With SmarterShade, you can request a clinic appointment, read your test results, renew a prescription or communicate with your care team.     To access your existing account, please contact your [facility] or call [phone] for assistance.        Care EveryWhere ID     This is your Care EveryWhere ID. This could be used by other organizations to access your [place] medical records  [id number]         Blood Pressure from Last 3 Encounters:   09/27/18 110/70   09/25/18 123/67    08/17/18 117/69    Weight from Last 3 Encounters:   09/27/18 79.4 kg (175 lb)   09/25/18 80.4 kg (177 lb 4.8 oz)   08/17/18 80.4 kg (177 lb 4 oz)              Today, you had the following     No orders found for display       Primary Care Provider Office Phone # Fax #    Ree Michele [phone]       [address] [facility] [address]  [place] [address]        Equal Access to Services     LUIS M ROSAS : Hadii aad ku hadasho Soomaali, waaxda luqadaha, qaybta kaalmada adeegyada, waxay idiin hayjewelsn jory zarate . So [facility] [phone].    ATENCIÓN: Si habla español, tiene a farah disposición servicios gratuitos de asistencia lingüística. LlSum[facility] [phone].    We comply with applicable federal civil rights laws and [place] laws. We do not discriminate on the basis of race, color, national origin, age, disability, sex, sexual orientation, or gender identity.            Thank you!     Thank you for choosing [facility]  for your care. Our goal is always to provide you with excellent care. Hearing back from our patients is one way we can continue to improve our services. Please take a few minutes to complete the written survey that you may receive in the mail after your visit with us. Thank you!             Your Updated Medication List - Protect others around you: Learn how to safely use, store and throw away your medicines at www.disposemymeds.org.          This list is accurate as of 9/27/18 11:59 PM.  Always use your most recent med list.                   Brand Name Dispense Instructions for use Diagnosis    levETIRAcetam 1000 MG Tabs     180 tablet    Take 1,000 mg by mouth 2 times daily    Convulsions, unspecified convulsion type (H)       tacrolimus 0.1 % ointment    PROTOPIC    60 g    Apply topically 2 times daily    Dermatitis       vitamin D 2000 units tablet     90 tablet    Take 1 tablet by mouth daily Take one tablet daily.    Vitamin D deficiency

## 2018-09-27 NOTE — MR AVS SNAPSHOT
"              After Visit Summary   9/27/2018    Mellisa Thompson    MRN: [id number]           Patient Information     Date Of Birth          1975        Visit Information        Provider Department      9/27/2018 3:30 PM Mario Gibbs MD [facility]        Today's Diagnoses     Encounter for gynecological examination without abnormal finding    -  1    Screen for STD (sexually transmitted disease)        Screening for chlamydial disease           Follow-ups after your visit        Who to contact     If you have questions or need follow up information about today's clinic visit or your schedule please contact [facility] directly at [phone].  Normal or non-critical lab and imaging results will be communicated to you by MyChart, letter or phone within 4 business days after the clinic has received the results. If you do not hear from us within 7 days, please contact the clinic through Katangohart or phone. If you have a critical or abnormal lab result, we will notify you by phone as soon as possible.  Submit refill requests through Koinify or call your pharmacy and they will forward the refill request to us. Please allow 3 business days for your refill to be completed.          Additional Information About Your Visit        MyChart Information     [employer] gives you secure access to your electronic health record. If you see a primary care provider, you can also send messages to your care team and make appointments. If you have questions, please call your primary care clinic.  If you do not have a primary care provider, please call [phone] and they will assist you.        Care EveryWhere ID     This is your Care EveryWhere ID. This could be used by other organizations to access your [facility] medical records  [id number]        Your Vitals Were     Pulse Height Last Period Breastfeeding? BMI (Body Mass Index)       64 5' 3.25\" (1.607 m) 08/23/2018 No 30.76 kg/m2  "       Blood Pressure from Last 3 Encounters:   09/27/18 110/70   09/25/18 123/67   08/17/18 117/69    Weight from Last 3 Encounters:   09/27/18 175 lb (79.4 kg)   09/25/18 177 lb 4.8 oz (80.4 kg)   08/17/18 177 lb 4 oz (80.4 kg)              We Performed the Following     CHLAMYDIA TRACHOMATIS PCR     NEISSERIA GONORRHOEA PCR        Primary Care Provider Office Phone # Fax #    Ree Michele [phone]       [address] [facility] [address]  [facility] [address]        Equal Access to Services     [facility]: Hadii aad ku hadliamo Sobart, waaxda luqadaha, qaybta kaalmada adeora, rosio zarate . So [facility] [phone].    ATENCIÓN: Si habla español, tiene a farah disposición servicios gratuitos de asistencia lingüística. [facility] [phone].    We comply with applicable federal civil rights laws and [place] laws. We do not discriminate on the basis of race, color, national origin, age, disability, sex, sexual orientation, or gender identity.            Thank you!     Thank you for choosing [facility]  for your care. Our goal is always to provide you with excellent care. Hearing back from our patients is one way we can continue to improve our services. Please take a few minutes to complete the written survey that you may receive in the mail after your visit with us. Thank you!             Your Updated Medication List - Protect others around you: Learn how to safely use, store and throw away your medicines at www.disposemymeds.org.          This list is accurate as of 9/27/18  4:32 PM.  Always use your most recent med list.                   Brand Name Dispense Instructions for use Diagnosis    levETIRAcetam 1000 MG Tabs     180 tablet    Take 1,000 mg by mouth 2 times daily    Convulsions, unspecified convulsion type (H)       tacrolimus 0.1 % ointment    PROTOPIC    60 g    Apply topically 2 times daily    Dermatitis       vitamin D 2000 units tablet      90 tablet    Take 1 tablet by mouth daily Take one tablet daily.    Vitamin D deficiency

## 2018-09-28 ASSESSMENT — PATIENT HEALTH QUESTIONNAIRE - PHQ9: SUM OF ALL RESPONSES TO PHQ QUESTIONS 1-9: 1

## 2018-09-28 ASSESSMENT — ANXIETY QUESTIONNAIRES: GAD7 TOTAL SCORE: 2

## 2018-09-30 LAB
C TRACH DNA SPEC QL NAA+PROBE: NEGATIVE
N GONORRHOEA DNA SPEC QL NAA+PROBE: NEGATIVE
SPECIMEN SOURCE: NORMAL
SPECIMEN SOURCE: NORMAL

## 2018-10-01 NOTE — PROGRESS NOTES
Health Psychology                                      Department of Medicine                                           [facility] Mail Code 749    Edwina Lara, Ph.D., L.P. [phone]  [address] [place] [address], [facility] Luzma Oleary, Ph.D.,  L.P. [phone]  [place] [address]  Med Hansen, Ph.D., A.B.P.P., L.P. [phone]       Joyce Huerta, PhD, LP [phone]  ________________________________________________________________________________________________  Health Psychology - Follow up Visit  Confidential Summary*    REFERRAL SOURCE  Self    CHIEF COMPLAINT/REASON FOR VISIT  Patient seen for session after experiencing increased anxiety and depressed mood following change in her job situation.        Subjective:  Patient began with report that she is back at her job full time and realizing that the environment is unprofessional and therefore, stressful for her.  She reported that her new principal shared some very personal information with her.  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  She continues to hope to get accepted into graduate school.      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    She reported that she is not sleeping as well and has been struggling with some middle insomnia.      She described concerns surrounding her history with diverticulitis and is not sure how this may interact with her appointment in the reproductive medicine clinic.      Patient reported that she is concerned that she may have a Fear of Success and would like me to read material on this. She will send.        Objective:  Patient was  on time for today s session, appropriately groomed and dressed, and demonstrated good eye contact.  She appeared alert and oriented.   Mood was mildly anxious surrounding job concerns.  Patient adamantly denied suicidal or assaultive ideation, plan, or intent.       Assessment:  The patients is experiencing both depressive and anxious symptoms in response to job challenges and career direction decisions.      Plan: See in one week, morning better.     Time In: 8:00  Time Out::  9:00    Diagnosis:  Axis I adjustment disorder with anxious features   Axis II Deferred   Axis III Epilepsy, see medical record   Axis IV Psychosocial and Environmental Stressors: work related     Joyce Huerta, Ph.D., L.P.      *In accordance with the Rules of the [place] Board of Psychology, it is noted that psychological descriptions and scientific procedures underlying psychological evaluations have limitations.  Absolute predictions cannot be made based on information in this report.

## 2018-10-04 ENCOUNTER — OFFICE VISIT (OUTPATIENT)
Dept: PSYCHOLOGY | Facility: CLINIC | Age: 43
End: 2018-10-04
Payer: COMMERCIAL

## 2018-10-04 DIAGNOSIS — F43.23 ADJUSTMENT DISORDER WITH MIXED ANXIETY AND DEPRESSED MOOD: ICD-10-CM

## 2018-10-04 DIAGNOSIS — F33.0 MAJOR DEPRESSIVE DISORDER, RECURRENT EPISODE, MILD WITH SEASONAL PATTERN (H): Primary | ICD-10-CM

## 2018-10-04 NOTE — MR AVS SNAPSHOT
After Visit Summary   10/4/2018    Mellisa Thompson    MRN: [id number]           Patient Information     Date Of Birth          1975        Visit Information        Provider Department      10/4/2018 3:00 PM Joyce Huerta, PhD NIYAH [facility]        Today's Diagnoses     Major depressive disorder, recurrent episode, mild with seasonal pattern (H)    -  1    Adjustment disorder with mixed anxiety and depressed mood           Follow-ups after your visit        Your next 10 appointments already scheduled     Oct 25, 2018  3:30 PM CDT   (Arrive by 3:15 PM)   Return Visit with Joyce Huerta, PhD NIYAH   [facility] ([facility])    [address] [facility]  3rd Floor  [facility] [address]   [phone]            Nov 05, 2018  8:30 AM CST   (Arrive by 7:30 AM)   MR ENTEROGRAPHY WWO CONTRAST with UCMR1, UC IMAGING NURSE   [facility] MRI ([facility])    [address]  [address] [facility] [address]   [phone]           How do I prepare for my exam? (Food and drink instructions) Do not eat or drink for 6 hours before the exam. If you need to take medicine, you may take it with small sips of water. (We may ask you to take liquid medicine as well.)  How do I prepare for my exam? (Other instructions) Take your medicines as usual, unless your doctor tells you not to. You may or may not receive intravenous (IV) contrast for this exam pending the discretion of the Radiologist. You will be asked to drink oral contrast for this exam. You will be given the oral contrast in MRI prior to your exam. Please arrive 60-90 minutes before your exam time to drink the oral contrast.  What should I wear: The MRI machine uses a strong magnet. Please wear clothes without metal (snaps, zippers). A sweatsuit works well, or we may give you a hospital gown. Please remove any body piercings and hair extensions before you  arrive. You will also remove watches, jewelry, hairpins, wallets, dentures, partial dental plates and hearing aids. You may wear contact lenses, and you may be able to wear your rings. We have a safe place to keep your personal items, but it is safer to leave them at home.  How long does the exam take: Most tests take 30 to 60 minutes.  What should I bring: Bring a list of your current medicines to your exam (including vitamins, minerals and over-the-counter drugs).  Do I need a :  No  is needed.  What should I do after the exam: No Restrictions, You may resume normal activities.  What is this test: MRI (magnetic resonance imaging) uses a strong magnet and radio waves to look inside the body. An MRA (magnetic resonance angiogram) does the same thing, but it lets us look at your blood vessels. A computer turns the radio waves into pictures showing cross sections of the body, much like slices of bread. This helps us see any problems more clearly. You may receive fluid (called  contrast ) before or during your scan. The fluid helps us see the pictures better. We give the fluid through an IV (small needle in your arm).  Who should I call with questions: If you have any questions, please contact your Imaging Department exam site. Directions, parking instructions, and other information is available on our website, [email or web address].              Who to contact     Please call your clinic at [phone] to:    Ask questions about your health    Make or cancel appointments    Discuss your medicines    Learn about your test results    Speak to your doctor            Additional Information About Your Visit        Appterahart Information     Deal[employer] gives you secure access to your electronic health record. If you see a primary care provider, you can also send messages to your care team and make appointments. If you have questions, please call your primary care clinic.  If you do not have a primary care provider,  please call [phone] and they will assist you.      [employer] is an electronic gateway that provides easy, online access to your medical records. With [employer], you can request a clinic appointment, read your test results, renew a prescription or communicate with your care team.     To access your existing account, please contact your [facility] or call [phone] for assistance.        Care EveryWhere ID     This is your Care EveryWhere ID. This could be used by other organizations to access your [facility] medical records  [id number]         Blood Pressure from Last 3 Encounters:   09/27/18 110/70   09/25/18 123/67   08/17/18 117/69    Weight from Last 3 Encounters:   09/27/18 79.4 kg (175 lb)   09/25/18 80.4 kg (177 lb 4.8 oz)   08/17/18 80.4 kg (177 lb 4 oz)              Today, you had the following     No orders found for display       Primary Care Provider Office Phone # Fax #    Ree Michele [phone]       [address] [place] [address]        Equal Access to Services     [facility] : Hadii satish ku hadasho Sobart, waaxda luqadaha, qaybta kaalmada cindy, rosio zarate . So [facility] [phone].    ATENCIÓN: Si habla español, tiene a farah disposición servicios gratuitos de asistencia lingüística. Llame al [phone].    We comply with applicable federal civil rights laws and [place] laws. We do not discriminate on the basis of race, color, national origin, age, disability, sex, sexual orientation, or gender identity.            Thank you!     Thank you for choosing [facility]  for your care. Our goal is always to provide you with excellent care. Hearing back from our patients is one way we can continue to improve our services. Please take a few minutes to complete the written survey that you may receive in the mail after your visit with us. Thank you!             Your Updated Medication  List - Protect others around you: Learn how to safely use, store and throw away your medicines at www.disposemymeds.org.          This list is accurate as of 10/4/18 11:59 PM.  Always use your most recent med list.                   Brand Name Dispense Instructions for use Diagnosis    levETIRAcetam 1000 MG Tabs     180 tablet    Take 1,000 mg by mouth 2 times daily    Convulsions, unspecified convulsion type (H)       tacrolimus 0.1 % ointment    PROTOPIC    60 g    Apply topically 2 times daily    Dermatitis       vitamin D 2000 units tablet     90 tablet    Take 1 tablet by mouth daily Take one tablet daily.    Vitamin D deficiency

## 2018-10-11 ENCOUNTER — OFFICE VISIT (OUTPATIENT)
Dept: PSYCHOLOGY | Facility: CLINIC | Age: 43
End: 2018-10-11
Payer: COMMERCIAL

## 2018-10-11 DIAGNOSIS — F43.23 ADJUSTMENT DISORDER WITH MIXED ANXIETY AND DEPRESSED MOOD: Primary | ICD-10-CM

## 2018-10-11 NOTE — MR AVS SNAPSHOT
After Visit Summary   10/11/2018    Mellisa Thompson    MRN: [id number]           Patient Information     Date Of Birth          1975        Visit Information        Provider Department      10/11/2018 3:30 PM Joyce Huerta, PhD [facility]        Today's Diagnoses     Adjustment disorder with mixed anxiety and depressed mood    -  1       Follow-ups after your visit        Your next 10 appointments already scheduled     Nov 05, 2018  8:30 AM CST   (Arrive by 7:30 AM)   MR ENTEROGRAPHY WWO CONTRAST with UCMR1, UC IMAGING NURSE   [facility] ([facility])    [address] [address] [phone]           How do I prepare for my exam? (Food and drink instructions) Do not eat or drink for 6 hours before the exam. If you need to take medicine, you may take it with small sips of water. (We may ask you to take liquid medicine as well.)  How do I prepare for my exam? (Other instructions) Take your medicines as usual, unless your doctor tells you not to. You may or may not receive intravenous (IV) contrast for this exam pending the discretion of the Radiologist. You will be asked to drink oral contrast for this exam. You will be given the oral contrast in MRI prior to your exam. Please arrive 60-90 minutes before your exam time to drink the oral contrast.  What should I wear: The MRI machine uses a strong magnet. Please wear clothes without metal (snaps, zippers). A sweatsuit works well, or we may give you a hospital gown. Please remove any body piercings and hair extensions before you arrive. You will also remove watches, jewelry, hairpins, wallets, dentures, partial dental plates and hearing aids. You may wear contact lenses, and you may be able to wear your rings. We have a safe place to keep your personal items, but it is safer to leave them at home.  How long does the exam take: Most tests take 30 to 60  minutes.  What should I bring: Bring a list of your current medicines to your exam (including vitamins, minerals and over-the-counter drugs).  Do I need a :  No  is needed.  What should I do after the exam: No Restrictions, You may resume normal activities.  What is this test: MRI (magnetic resonance imaging) uses a strong magnet and radio waves to look inside the body. An MRA (magnetic resonance angiogram) does the same thing, but it lets us look at your blood vessels. A computer turns the radio waves into pictures showing cross sections of the body, much like slices of bread. This helps us see any problems more clearly. You may receive fluid (called  contrast ) before or during your scan. The fluid helps us see the pictures better. We give the fluid through an IV (small needle in your arm).  Who should I call with questions: If you have any questions, please contact your Imaging Department exam site. Directions, parking instructions, and other information is available on our website, [email or web address].              Who to contact     Please call your clinic at [phone] to:    Ask questions about your health    Make or cancel appointments    Discuss your medicines    Learn about your test results    Speak to your doctor            Additional Information About Your Visit        [employer] Information     [employer] gives you secure access to your electronic health record. If you see a primary care provider, you can also send messages to your care team and make appointments. If you have questions, please call your primary care clinic.  If you do not have a primary care provider, please call [phone] and they will assist you.      [employer] is an electronic gateway that provides easy, online access to your medical records. With [employer], you can request a clinic appointment, read your test results, renew a prescription or communicate with your care team.     To access your existing account, please  contact your [facility] or call [phone] for assistance.        Care EveryWhere ID     This is your Care EveryWhere ID. This could be used by other organizations to access your [facility] medical records  [id number]         Blood Pressure from Last 3 Encounters:   09/27/18 110/70   09/25/18 123/67   08/17/18 117/69    Weight from Last 3 Encounters:   09/27/18 79.4 kg (175 lb)   09/25/18 80.4 kg (177 lb 4.8 oz)   08/17/18 80.4 kg (177 lb 4 oz)              Today, you had the following     No orders found for display       Primary Care Provider Office Phone # Fax #    Ree Michele [phone]       [address] [address]        Equal Access to Services     [facility] : Hadii satish hoover hadasho Sobart, waaxda luqadaha, qaybta kaalmada ademontezyada, rosio zarate . So [facility] [phone].    ATENCIÓN: Si habla español, tiene a farah disposición servicios gratuitos de asistencia lingüística. Dennis al [phone].    We comply with applicable federal civil rights laws and [place] laws. We do not discriminate on the basis of race, color, national origin, age, disability, sex, sexual orientation, or gender identity.            Thank you!     Thank you for choosing [facility]  for your care. Our goal is always to provide you with excellent care. Hearing back from our patients is one way we can continue to improve our services. Please take a few minutes to complete the written survey that you may receive in the mail after your visit with us. Thank you!             Your Updated Medication List - Protect others around you: Learn how to safely use, store and throw away your medicines at www.disposemymeds.org.          This list is accurate as of 10/11/18 11:59 PM.  Always use your most recent med list.                   Brand Name Dispense Instructions for use Diagnosis    levETIRAcetam 1000 MG Tabs     180  tablet    Take 1,000 mg by mouth 2 times daily    Convulsions, unspecified convulsion type (H)       tacrolimus 0.1 % ointment    PROTOPIC    60 g    Apply topically 2 times daily    Dermatitis       vitamin D 2000 units tablet     90 tablet    Take 1 tablet by mouth daily Take one tablet daily.    Vitamin D deficiency

## 2018-10-22 NOTE — PROGRESS NOTES
Health Psychology                                      Department of Medicine                                           [facility] Mail Code 740    Edwina Lara, Ph.D., L.P. [phone]  [address] [place] [address], [facility] Luzma Oleary, Ph.D.,  L.P. [phone]  [place] [address]  Med Hansen, Ph.D., A.B.P.P., L.P. [phone]       Joyce Huerta, PhD, LP [phone]  ________________________________________________________________________________________________  Health Psychology - Follow up Visit  Confidential Summary*    REFERRAL SOURCE  Self    CHIEF COMPLAINT/REASON FOR VISIT  Patient seen for session after experiencing increased anxiety and depressed mood following change in her job situation.        Subjective:  Patient began with report that she is now aware that she has not quite been herself since her divorce in 2015.  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  She is looking forward to this opportunity.      Patient continues to describe anxious anticipation of word surrounding her acceptance to a masters program.  She will hear in December but likely will be invited to interview sooner.      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  She will likely make a change next year, if not in grad school.       Objective:  Patient was on time for today s session, appropriately groomed and dressed, and demonstrated good eye contact.  She appeared alert and oriented.   Patient adamantly denied suicidal or assaultive ideation, plan, or intent.       Assessment:  The patients is experiencing both depressive and anxious symptoms in response to job challenges and career direction decisions.      Plan: See in one week, morning better.     Time In: 3:00  Time Out::  4:00    Diagnosis:  Axis I adjustment disorder with anxious features   Axis II  Deferred   Axis III Epilepsy, see medical record   Axis IV Psychosocial and Environmental Stressors: work related     Joyce Huerta, Ph.D., L.P.      *In accordance with the Rules of the [place] Board of Psychology, it is noted that psychological descriptions and scientific procedures underlying psychological evaluations have limitations.  Absolute predictions cannot be made based on information in this report.

## 2018-10-25 ENCOUNTER — OFFICE VISIT (OUTPATIENT)
Dept: PSYCHOLOGY | Facility: CLINIC | Age: 43
End: 2018-10-25
Payer: COMMERCIAL

## 2018-10-25 DIAGNOSIS — F43.23 ADJUSTMENT DISORDER WITH MIXED ANXIETY AND DEPRESSED MOOD: Primary | ICD-10-CM

## 2018-10-25 NOTE — MR AVS SNAPSHOT
After Visit Summary   10/25/2018    Mellisa Thompson    MRN: [id number]           Patient Information     Date Of Birth          1975        Visit Information        Provider Department      10/25/2018 3:30 PM Joyce Huerta, PhD [facility]        Today's Diagnoses     Adjustment disorder with mixed anxiety and depressed mood    -  1       Follow-ups after your visit        Your next 10 appointments already scheduled     Oct 31, 2018  3:30 PM CDT   (Arrive by 3:15 PM)   Return Visit with Joyce Huerta, PhD NIYAH   [facility] ([facility])    [address] [place] [id number]   [phone]            Nov 05, 2018  8:30 AM CST   (Arrive by 7:30 AM)   MR ENTEROGRAPHY WWO CONTRAST with UCMR1, UC IMAGING NURSE   [facility] MRI ([facility])    [address] [place] [address]   [phone]           How do I prepare for my exam? (Food and drink instructions) Do not eat or drink for 6 hours before the exam. If you need to take medicine, you may take it with small sips of water. (We may ask you to take liquid medicine as well.)  How do I prepare for my exam? (Other instructions) Take your medicines as usual, unless your doctor tells you not to. You may or may not receive intravenous (IV) contrast for this exam pending the discretion of the Radiologist. You will be asked to drink oral contrast for this exam. You will be given the oral contrast in MRI prior to your exam. Please arrive 60-90 minutes before your exam time to drink the oral contrast.  What should I wear: The MRI machine uses a strong magnet. Please wear clothes without metal (snaps, zippers). A sweatsuit works well, or we may give you a hospital gown. Please remove any body piercings and hair extensions before you arrive. You will also remove watches, jewelry, hairpins, wallets, dentures,  partial dental plates and hearing aids. You may wear contact lenses, and you may be able to wear your rings. We have a safe place to keep your personal items, but it is safer to leave them at home.  How long does the exam take: Most tests take 30 to 60 minutes.  What should I bring: Bring a list of your current medicines to your exam (including vitamins, minerals and over-the-counter drugs).  Do I need a :  No  is needed.  What should I do after the exam: No Restrictions, You may resume normal activities.  What is this test: MRI (magnetic resonance imaging) uses a strong magnet and radio waves to look inside the body. An MRA (magnetic resonance angiogram) does the same thing, but it lets us look at your blood vessels. A computer turns the radio waves into pictures showing cross sections of the body, much like slices of bread. This helps us see any problems more clearly. You may receive fluid (called  contrast ) before or during your scan. The fluid helps us see the pictures better. We give the fluid through an IV (small needle in your arm).  Who should I call with questions: If you have any questions, please contact your Imaging Department exam site. Directions, parking instructions, and other information is available on our website, [email or web address].              Who to contact     Please call your clinic at [phone] to:    Ask questions about your health    Make or cancel appointments    Discuss your medicines    Learn about your test results    Speak to your doctor            Additional Information About Your Visit        [employer] Information     "[employer]" gives you secure access to your electronic health record. If you see a primary care provider, you can also send messages to your care team and make appointments. If you have questions, please call your primary care clinic.  If you do not have a primary care provider, please call [phone] and they will assist you.      "[employer]" is an  electronic gateway that provides easy, online access to your medical records. With [employer], you can request a clinic appointment, read your test results, renew a prescription or communicate with your care team.     To access your existing account, please contact your [facility] or call [phone] for assistance.        Care EveryWhere ID     This is your Care EveryWhere ID. This could be used by other organizations to access your [place] medical records  [id number]         Blood Pressure from Last 3 Encounters:   09/27/18 110/70   09/25/18 123/67   08/17/18 117/69    Weight from Last 3 Encounters:   09/27/18 79.4 kg (175 lb)   09/25/18 80.4 kg (177 lb 4.8 oz)   08/17/18 80.4 kg (177 lb 4 oz)              Today, you had the following     No orders found for display       Primary Care Provider Office Phone # Fax #    Ree Michele [phone]       [address] [place] [address]        Equal Access to Services     LUIS M ROSAS : Hadii aad ku hadasho Soomaali, waaxda luqadaha, qaybta kaalmada adeegyada, rosio simmons haytaty zarate . So [facility] [phone].    ATENCIÓN: Si habla español, tiene a farah disposición servicios gratuitos de asistencia lingüística. Llame al [phone].    We comply with applicable federal civil rights laws and [place] laws. We do not discriminate on the basis of race, color, national origin, age, disability, sex, sexual orientation, or gender identity.            Thank you!     Thank you for choosing [facility]  for your care. Our goal is always to provide you with excellent care. Hearing back from our patients is one way we can continue to improve our services. Please take a few minutes to complete the written survey that you may receive in the mail after your visit with us. Thank you!             Your Updated Medication List - Protect others around you: Learn how to safely use, store and  throw away your medicines at www.disposemymeds.org.          This list is accurate as of 10/25/18 11:59 PM.  Always use your most recent med list.                   Brand Name Dispense Instructions for use Diagnosis    levETIRAcetam 1000 MG Tabs     180 tablet    Take 1,000 mg by mouth 2 times daily    Convulsions, unspecified convulsion type (H)       tacrolimus 0.1 % ointment    PROTOPIC    60 g    Apply topically 2 times daily    Dermatitis       vitamin D 2000 units tablet     90 tablet    Take 1 tablet by mouth daily Take one tablet daily.    Vitamin D deficiency

## 2018-10-28 NOTE — PROGRESS NOTES
Health Psychology                                      Department of Medicine                                           [facility] Mail Code 741    Edwina Lara, Ph.D., L.P. [phone]  [address] [place] [address], [facility] Luzma Oleary, Ph.D.,  L.P. [phone]  [place] [address]  Med Hansen, Ph.D., A.B.P.P., L.P. [phone]       Joyce Huerta, PhD, LP [phone]  ________________________________________________________________________________________________  Health Psychology - Follow up Visit  Confidential Summary*    REFERRAL SOURCE  Self    CHIEF COMPLAINT/REASON FOR VISIT  Patient seen for session after experiencing increased anxiety and depressed mood following change in her job situation.        Subjective:  Patient began with report that she is continuing to work and that work situation has improved.  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      She described anxious anticipation surrounding receiving interview for consideration of admission to an [employer] program specializing in entrepreneurship.  She reported that she attempts to make life decisions based on her emotions and that consideration of moving to [place] in pursuit of this opportunity fills her with energy and excitement.      Patient reported that she was offerred a position on the [employer] board for understanding discrimination and is looking forward to this opportunity to serve and learn.      Patient continues to report that her top priority is to develop a long term relationship and to  again but she is reluctant to engage in any active steps to meet a partner.  Discussed online dating and she is opposed.  Encouraged her to consider participating in activities where she may meet someone, including [employer].      She continues to describe a more haley filled existence when living in [place].  She  voiced concern surrounding seasonal changes and the impact on her mood.        Objective:  Patient was on time for today s session, appropriately groomed and dressed, and demonstrated good eye contact.  She appeared alert and oriented.   Patient adamantly denied suicidal or assaultive ideation, plan, or intent.       Assessment:  The patients is experiencing both depressive and anxious symptoms in response to job challenges and career direction decisions.      Plan: See in two weeks,.     Time In: 3:00  Time Out::  4:00    Diagnosis:  Axis I adjustment disorder with anxious features   Axis II Deferred   Axis III Epilepsy, see medical record   Axis IV Psychosocial and Environmental Stressors: work related     Joyce Huerta, Ph.D., L.P.      *In accordance with the Rules of the [place] Board of Psychology, it is noted that psychological descriptions and scientific procedures underlying psychological evaluations have limitations.  Absolute predictions cannot be made based on information in this report.

## 2018-10-31 ENCOUNTER — OFFICE VISIT (OUTPATIENT)
Dept: PSYCHOLOGY | Facility: CLINIC | Age: 43
End: 2018-10-31
Payer: COMMERCIAL

## 2018-10-31 DIAGNOSIS — F33.0 MAJOR DEPRESSIVE DISORDER, RECURRENT EPISODE, MILD WITH SEASONAL PATTERN (H): Primary | ICD-10-CM

## 2018-10-31 NOTE — PROGRESS NOTES
"Health Psychology                                      Department of Medicine                                           [facility] Mail Code 741    Edwina Lara, Ph.D., L.P. [phone]  [address] [place], [place] [address]Nancy Luzma Oleary, Ph.D.,  L.P. [phone]  [place] [address]  Med Hansen, Ph.D., A.B.P.CARLI., L.P. [phone]       Joyce Huerta, PhD, LP [phone]  ________________________________________________________________________________________________  Health Psychology - Follow up Visit  Confidential Summary*    REFERRAL SOURCE  Self    CHIEF COMPLAINT/REASON FOR VISIT  Patient seen for session after experiencing increased anxiety and depressed mood following change in her job situation.        Subjective:  Patient seen for ongoing sessions.  Began with report that the [facility] committee on disabilities did not go as planned.  She reported that she was again confronted with issues related to sexism and possibly discrimination associated with her ethnicity.  Discussed her feelings of anger and her perception that she loses her effectiveness when she allows herself to become angry.  Discussed how she might \"cool\" her hot thoughts in order that she might increase her ability to handle the situation with options.      Patient discussed her priorities and the top value she has is to develop another intimate relationship and to again be .  Confronted patient with the observation that she is doing little to further this goal.  She reported that she has asked a male colleague to be a mentor to her but is aware that their relationship has been flirty and that he may have a crush on her despite being .  Discussed how she is spending her energy and encouraged her to consider that time is a resource that might be dedicated to achieving goals.      Patient continues to describe anxiety surrounding her desire to attend graduate school " and her concern that she may not get an interview.  Encouraged her to consider applying to other programs but she maintains that she is pursuing her passion and that other programs have not excited her like the one she applied to.        Objective:  Patient was on time for today s session, appropriately groomed and dressed, and demonstrated good eye contact.  She appeared alert and oriented.   Patient adamantly denied suicidal or assaultive ideation, plan, or intent.       Assessment:  The patients is experiencing anxiety in response to job challenges and career direction decisions.      Plan: See in one week.      Time In: 3:30  Time Out::  430    Diagnosis:  Axis I adjustment disorder with anxious features   Axis II Deferred   Axis III Epilepsy, see medical record   Axis IV Psychosocial and Environmental Stressors: work related     Joyce Huerta, Ph.D., L.P.      *In accordance with the Rules of the [place] Board of Psychology, it is noted that psychological descriptions and scientific procedures underlying psychological evaluations have limitations.  Absolute predictions cannot be made based on information in this report.

## 2018-10-31 NOTE — MR AVS SNAPSHOT
After Visit Summary   10/31/2018    Mellisa Thompson    MRN: [id number]           Patient Information     Date Of Birth          1975        Visit Information        Provider Department      10/31/2018 3:30 PM Joyce Huerta, PhD [facility]        Today's Diagnoses     Major depressive disorder, recurrent episode, mild with seasonal pattern (H)    -  1       Follow-ups after your visit        Your next 10 appointments already scheduled     Nov 15, 2018  3:30 PM CST   (Arrive by 3:15 PM)   Return Visit with Joyce Huerta, PhD NIYAH   [facility] ([facility])    [address] [address] [phone]              Who to contact     Please call your clinic at [phone] to:    Ask questions about your health    Make or cancel appointments    Discuss your medicines    Learn about your test results    Speak to your doctor            Additional Information About Your Visit        MyChart Information     [employer] gives you secure access to your electronic health record. If you see a primary care provider, you can also send messages to your care team and make appointments. If you have questions, please call your primary care clinic.  If you do not have a primary care provider, please call [phone] and they will assist you.      [email or web address] is an electronic gateway that provides easy, online access to your medical records. With [email or web address], you can request a clinic appointment, read your test results, renew a prescription or communicate with your care team.     To access your existing account, please contact your [facility] or call [phone] for assistance.        Care EveryWhere ID     This is your Care EveryWhere ID. This could be used by other organizations to access your [place] medical records  [id number]         Blood Pressure from Last 3 Encounters:   09/27/18 110/70    09/25/18 123/67   08/17/18 117/69    Weight from Last 3 Encounters:   09/27/18 79.4 kg (175 lb)   09/25/18 80.4 kg (177 lb 4.8 oz)   08/17/18 80.4 kg (177 lb 4 oz)              Today, you had the following     No orders found for display       Primary Care Provider Office Phone # Fax #    Ree Michele [phone]       [address] [address]        Equal Access to Services     [facility] : Hadii aad ku hadasho Soomaali, waaxda luqadaha, qaybta kaalmada adeegyada, rosio zarate . So [facility] [phone].    ATENCIÓN: Si habla español, tiene a farah disposición servicios gratuitos de asistencia lingüística. [facility] [phone].    We comply with applicable federal civil rights laws and [place] laws. We do not discriminate on the basis of race, color, national origin, age, disability, sex, sexual orientation, or gender identity.            Thank you!     Thank you for choosing [facility]  for your care. Our goal is always to provide you with excellent care. Hearing back from our patients is one way we can continue to improve our services. Please take a few minutes to complete the written survey that you may receive in the mail after your visit with us. Thank you!             Your Updated Medication List - Protect others around you: Learn how to safely use, store and throw away your medicines at www.disposemymeds.org.          This list is accurate as of 10/31/18 11:59 PM.  Always use your most recent med list.                   Brand Name Dispense Instructions for use Diagnosis    levETIRAcetam 1000 MG Tabs     180 tablet    Take 1,000 mg by mouth 2 times daily    Convulsions, unspecified convulsion type (H)       tacrolimus 0.1 % ointment    PROTOPIC    60 g    Apply topically 2 times daily    Dermatitis       vitamin D 2000 units tablet     90 tablet    Take 1 tablet by mouth daily Take one tablet daily.    Vitamin D deficiency

## 2018-11-05 ENCOUNTER — RADIANT APPOINTMENT (OUTPATIENT)
Dept: MRI IMAGING | Facility: CLINIC | Age: 43
End: 2018-11-05
Attending: COLON & RECTAL SURGERY
Payer: COMMERCIAL

## 2018-11-05 DIAGNOSIS — K64.4 EXTERNAL HEMORRHOIDS: ICD-10-CM

## 2018-11-05 RX ORDER — GADOBUTROL 604.72 MG/ML
7.5 INJECTION INTRAVENOUS ONCE
Status: COMPLETED | OUTPATIENT
Start: 2018-11-05 | End: 2018-11-05

## 2018-11-05 RX ADMIN — GADOBUTROL 7.5 ML: 604.72 INJECTION INTRAVENOUS at 08:02

## 2018-11-05 NOTE — DISCHARGE INSTRUCTIONS
MRI Contrast Discharge Instructions    The IV contrast you received today will pass out of your body in your  urine. This will happen in the next 24 hours. You will not feel this process.  Your urine will not change color.    Drink at least 4 extra glasses of water or juice today (unless your doctor  has restricted your fluids). This reduces the stress on your kidneys.  You may take your regular medicines.    If you are on dialysis: It is best to have dialysis today.    If you have a reaction: Most reactions happen right away. If you have  any new symptoms after leaving the hospital (such as hives or swelling),  call your hospital at the correct number below. Or call your family doctor.  If you have breathing distress or wheezing, call 911.    Special instructions: ***    I have read and understand the above information.    Signature:______________________________________ Date:___________    Staff:__________________________________________ Date:___________     Time:__________    [facility] Departments:    ___[place]: [phone]  ___[facility]: [phone]  ___[place]: [phone] ___[facility]: [phone]  ___[facility]: [phone]  ___[facility]: [phone]  ___[phone]  ___[facility]: [phone]  ___[phone]

## 2018-11-09 NOTE — PROGRESS NOTES
Health Psychology                                      Department of Medicine                                           [facility] Mail Code 742    Edwina Lara, Ph.D., L.P. [phone]  [address] [place] [address] Luzma Oleary, Ph.D.,  L.P. [phone]   [address]  Med Hansen, Ph.D., A.B.CARLI.CARLI., L.P. [phone]       Joyce Huerta, PhD, LP [phone]  ________________________________________________________________________________________________  Health Psychology - Follow up Visit  Confidential Summary*    REFERRAL SOURCE  Self    CHIEF COMPLAINT/REASON FOR VISIT  Patient seen for session after experiencing increased anxiety and depressed mood following change in her job situation.        Subjective:  Patient began with report that her anxiety has increased surrounding her graduate school applications.  She is aware that she will hear any day now if she is invited for an interview.  Discussed plan b if her application is not favorably reviewed.  The patient is aware that she may not want to remain in [place].  She discussed a visit to see her sister and mother in [place] last year and her appreciation for the kindness shown to her when she is there.  She openly acknowledges being lonely and desiring a connection with others but continues to struggle to make intimate relations.  She reported that she may present as guarded given some negative experiences.  She continues to describe her desire to pursue her passion to increase public awareness and acceptance of epilepsy particularly in the  communities.  However, she continues to struggle with launching these efforts.  Encouraged her to seek out a mentor to assist.      Discussed stress tolerance tools and how she might notice her behaviors when she is with others that may indicate that she is not open to developing friendships.      She continues to enjoy [employer].       Objective:  Patient was on time for today s session, appropriately groomed and dressed, and demonstrated good eye contact.  She appeared alert and oriented.   Patient adamantly denied suicidal or assaultive ideation, plan, or intent.       Assessment:  The patients is experiencing anxiety in response to academic and/or career direction decisions.      Plan: See in one week.      Time In: 3:30  Time Out::  430    Diagnosis:  Axis I adjustment disorder with anxious features   Axis II Deferred   Axis III Epilepsy, see medical record   Axis IV Psychosocial and Environmental Stressors: work and academic related     Joyce Huerta, Ph.D., L.P.      *In accordance with the Rules of the [place] Board of Psychology, it is noted that psychological descriptions and scientific procedures underlying psychological evaluations have limitations.  Absolute predictions cannot be made based on information in this report.

## 2018-11-15 ENCOUNTER — OFFICE VISIT (OUTPATIENT)
Dept: PSYCHOLOGY | Facility: CLINIC | Age: 43
End: 2018-11-15
Payer: COMMERCIAL

## 2018-11-15 DIAGNOSIS — F33.0 MAJOR DEPRESSIVE DISORDER, RECURRENT EPISODE, MILD WITH SEASONAL PATTERN (H): Primary | ICD-10-CM

## 2018-11-28 ENCOUNTER — TRANSFERRED RECORDS (OUTPATIENT)
Dept: HEALTH INFORMATION MANAGEMENT | Facility: CLINIC | Age: 43
End: 2018-11-28

## 2018-11-28 ENCOUNTER — OFFICE VISIT (OUTPATIENT)
Dept: PSYCHOLOGY | Facility: CLINIC | Age: 43
End: 2018-11-28
Payer: COMMERCIAL

## 2018-11-28 ENCOUNTER — MEDICAL CORRESPONDENCE (OUTPATIENT)
Dept: HEALTH INFORMATION MANAGEMENT | Facility: CLINIC | Age: 43
End: 2018-11-28

## 2018-11-28 DIAGNOSIS — F33.0 MAJOR DEPRESSIVE DISORDER, RECURRENT EPISODE, MILD WITH SEASONAL PATTERN (H): Primary | ICD-10-CM

## 2018-11-28 DIAGNOSIS — Z11.3 SCREENING EXAMINATION FOR VENEREAL DISEASE: Primary | ICD-10-CM

## 2018-11-28 DIAGNOSIS — Z31.41 FERTILITY TESTING: ICD-10-CM

## 2018-11-28 LAB
HGB BLD-MCNC: 12.9 G/DL (ref 11.7–15.7)
LH SERPL-ACNC: 9.3 IU/L
TSH SERPL DL<=0.005 MIU/L-ACNC: 0.91 MU/L (ref 0.4–4)

## 2018-11-29 LAB
HBV SURFACE AB SERPL IA-ACNC: 0.33 M[IU]/ML
HCV AB SERPL QL IA: NONREACTIVE
HIV 1+2 AB+HIV1 P24 AG SERPL QL IA: NONREACTIVE
T PALLIDUM AB SER QL: NONREACTIVE

## 2018-11-30 ENCOUNTER — MEDICAL CORRESPONDENCE (OUTPATIENT)
Dept: CARDIOLOGY | Facility: CLINIC | Age: 43
End: 2018-11-30

## 2018-11-30 DIAGNOSIS — Z31.41 FERTILITY TESTING: Primary | ICD-10-CM

## 2018-12-03 LAB — MIS SERPL-MCNC: 0.02 NG/ML

## 2018-12-04 ENCOUNTER — MEDICAL CORRESPONDENCE (OUTPATIENT)
Dept: CARDIOLOGY | Facility: CLINIC | Age: 43
End: 2018-12-04

## 2018-12-04 DIAGNOSIS — Z31.69 ENCOUNTER FOR PRECONCEPTION CONSULTATION: ICD-10-CM

## 2018-12-04 DIAGNOSIS — O09.529 AMA (ADVANCED MATERNAL AGE) MULTIGRAVIDA 35+: Primary | ICD-10-CM

## 2018-12-09 NOTE — PROGRESS NOTES
Health Psychology                                      Department of Medicine                                           [facility] Mail Code 741    Edwina Lara, Ph.D., L.P. [phone]  [address] [place] [address], [facility] Luzma Oleary, Ph.D.,  L.P. [phone]  [place] [address]  Med Hansen, Ph.D., A.B.P.P., L.P. [phone]       Joyce Huerta, PhD, LP [phone]  ________________________________________________________________________________________________  Health Psychology - Follow up Visit  Confidential Summary*    REFERRAL SOURCE  Self    CHIEF COMPLAINT/REASON FOR VISIT  Patient seen for session after experiencing increased anxiety and depressed mood following change in her job situation.        Subjective:  Patient began with discussion of interest in participating in the [facility].  She continues to express concern that her opinions may not be valued by the men in the group.      Patient encouraged to focus time in session on issues important to the priorities that she set during our last session which include her desire to  again and her interest in starting a family.  Patient is aware that she may avoid these two topics because of her anxiety.      Today she reported that she has set up an appointment in reproductive medicine.  She denied that she has made any steps toward meeting a partner.  She will however, entertain a former boyfriend during his visit to the [place] in November.      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      Objective:  Patient was on time for today s session, appropriately groomed and dressed, and demonstrated good eye contact.  She appeared alert and oriented.   Patient adamantly  denied suicidal or assaultive ideation, plan, or intent.       Assessment:  The patients is experiencing anxiety in response to indecision about further family planning and career ecisions.      Plan: See in two weeks.      Time In: 3:30  Time Out::  430    Diagnosis:  Axis I adjustment disorder with anxious features   Axis II Deferred   Axis III Epilepsy, see medical record   Axis IV Psychosocial and Environmental Stressors: work and academic related     Joyce Huerta, Ph.D., L.P.      *In accordance with the Rules of the [place] Board of Psychology, it is noted that psychological descriptions and scientific procedures underlying psychological evaluations have limitations.  Absolute predictions cannot be made based on information in this report.

## 2018-12-10 NOTE — PROGRESS NOTES
Health Psychology                                      Department of Medicine                                           [facility] Mail Code 741    Edwina Lara, Ph.D., L.P. [phone]  [address] [place] [address], [facility] City[facility]ancy Luzma Oleary, Ph.D.,  L.P. [phone]  [place] [address]  Med Hansen, Ph.D., A.B.P.P., L.P. [phone]       Joyce Huerta, PhD,  [phone]  ________________________________________________________________________________________________  Health Psychology - Follow up Visit  Confidential Summary*    REFERRAL SOURCE  Self    CHIEF COMPLAINT/REASON FOR VISIT  Patient seen for session.  Patient has recurrent history of mood disturbance that accompanies seasonal changes.  In addition, chronic job stress.      Subjective:  Patient began with report that she plans to visit her family in [place] for the winter holiday break. Discussed her ongoing grief following the death of her father.  She described the important role he has as her supporter and that he was someone who believed in her.  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  Patient has heard that she was not accepted by graduate school to pursue JOAN in entrepreneurial ventures.  She was encouraged to consider other programs.  Patient remains unsure of the next steps in her career.    Discussed the disruption in her life that her marriage served.      Discussed ways to keep herself focused on activities that will allow her to achieve her future goals.      Objective:  Patient was on time for today s session, appropriately groomed and dressed, and demonstrated good eye contact.  She appeared alert and oriented.   Patient adamantly denied suicidal or assaultive ideation, plan, or intent.       Assessment:  The patients is experiencing anxiety in response to indecision about further family  planning and career ecisions.      Plan: See in two weeks.      Time In: 3:30  Time Out::  430    Diagnosis:  Axis I adjustment disorder with anxious features   Axis II Deferred   Axis III Epilepsy, see medical record   Axis IV Psychosocial and Environmental Stressors: work and academic related     Joyce Huerta, Ph.D., L.P.      *In accordance with the Rules of the [place] Board of [employer], it is noted that psychological descriptions and scientific procedures underlying psychological evaluations have limitations.  Absolute predictions cannot be made based on information in this report.

## 2018-12-19 ENCOUNTER — OFFICE VISIT (OUTPATIENT)
Dept: PSYCHOLOGY | Facility: CLINIC | Age: 43
End: 2018-12-19
Payer: COMMERCIAL

## 2018-12-19 DIAGNOSIS — F33.40 RECURRENT MAJOR DEPRESSIVE DISORDER IN REMISSION (H): Primary | ICD-10-CM

## 2018-12-22 NOTE — PROGRESS NOTES
"Health Psychology                                      Department of Medicine                                           [facility] Mail Code 741    Edwina Lara, Ph.D., L.P. [phone]  [address] [place] [address], [facility] Luzma Oleary, Ph.D.,  L.P. [phone]  [place] [address]  Med Hansen, Ph.D., A.B.CARLI.CARLI., L.P. [phone]       Joyce Huerta, PhD, LP [phone]  ________________________________________________________________________________________________  Health Psychology - Follow up Visit  Confidential Summary*    REFERRAL SOURCE  Self    CHIEF COMPLAINT/REASON FOR VISIT  Patient seen for session.  Patient has recurrent history of mood disturbance that accompanies seasonal changes.  In addition, chronic job stress.      Subjective:  Patient began with report that she is scheduled for an HSG on Friday to pursue her desire for pregnancy via reproductive medicine support.  Discussed her awareness that the process is going surprisingly smoothly which may indicate the possibility that it will happen.  She is going to keep moving forward on process and is open to options.    Patient described enjoyable visit with former boyfriend who now lives in [place].  He has a son living there from his marriage and he will not be able to relocate to the [place].  She described noticing a new aspect of their relationship during this visit and reported that he was comforting and she felt that she has a feeling of \"plenty\" with him.    She described role on [facility] board and her desire to move forward on her complaint with [employer] surrounding the article they wrote about her and feeling exploited in the process.      She has completed the MBSR program and described improved ability to achieve mindfulness.  She said \"beauty comes from being conscious for seconds\".      Patient described improving relationships at work. Denied any depressed mood or other symptoms.  "     Objective:  Patient was on time for today s session, appropriately groomed and dressed, and demonstrated good eye contact.  She appeared alert and oriented.   Patient adamantly denied suicidal or assaultive ideation, plan, or intent.       Assessment:  The patients is experiencing improvements overall.     Plan: See in two or three weeks.      Time In: 3:30  Time Out::  430    Diagnosis:  Axis I Mdd, recurrent with seasonal component, in remission   Axis II Deferred   Axis III Epilepsy, see medical record   Axis IV Psychosocial and Environmental Stressors: fertility process     Joyce Huerta, Ph.D., L.P.      *In accordance with the Rules of the [place] Board of [employer], it is noted that psychological descriptions and scientific procedures underlying psychological evaluations have limitations.  Absolute predictions cannot be made based on information in this report.

## 2019-01-11 ENCOUNTER — PRE VISIT (OUTPATIENT)
Dept: MATERNAL FETAL MEDICINE | Facility: CLINIC | Age: 44
End: 2019-01-11

## 2019-01-14 NOTE — PROGRESS NOTES
Maternal-Fetal Medicine Consultation    Mellisa Thompson  : 1975  MRN: [id number]    REFERRAL:  Mellisa Thompson is a 43 year old sent by Dr. Delong for preconception counseling.  Requesting provider: Dr. Jose Delong  CC: Dr. Mohan, Neurology [facility]    We spent 20 minutes with Mellisa during today's visit, with > 50% of this time spent in face to face consultation and counseling regarding potential comorbidities of pregnancy.       HPI:  Mellisa Thompson is a 43 year old P0 presenting with concerns regarding preconception counseling. Past medical history notable for epilepsy s/p temporal lobectomy, ruptured diverticulum 2018, anxiety, and depression. Patient denies any seizures since her temporal lobectomy in . She is seen by neurology frequently and reports no recent change in her keppra dose. History of ruptured diverticulum in 2018, for which she was hospitalized for and treated with antibiotics. Denies any issues with bowel movements since then. Note history of anxiety and depression, on no medications. Reports good mood as of late; she sees therapist regularly. Denies any other medical conditions. Does note recent travel to [place] over - 19. Patient also concerned about her bone health, she would like to know what she can do to improve her bone mineral density.     Past medical history:  - Infertility   - Epilepsy- history of temporal lobectomy, no seizures since treatment but has remained on Keppra. Sees Dr. Mohan at [facility].   - Depression/anxiety - prior to lobectomy - sees therapist regularly. No medications. No hospitalizations. Significant improvement after seizures resolved.   - Ruptured diverticulum 2018- hospitalized 2018 for perforated viscus, CT suggestive of perforated distal small bowel vs sigmoid diverticular perforation. Most recent colonoscopy 2018 reassuring. Initially thought this was Crohns disease but this is no longer the working diagnosis. Is on no  Crohn's meds.     Obstetrics History:   Never pregnant. No current partner. Seeing reproductive medicine Dr. Delong with plans for donor sperm and her own eggs if possible.     Gynecologic History:  - Last Pap: 12/20/16 NILM, HPV: negative  - Regular menses   - Last mammogram 9/7/18 normal   - Denies any history of abnormal pap smears  - Denies prior cervical surgery or procedures  - Denies any history of frequent UTIs, vaginal infections, or STIs    Past Medical History:  - Epilepsy   - Depression   - Ruptured diverticulum 1/2018  - Anxiety   - Latent TB, s/p treatment 2005  -Reports several fractures in recent years - wrist and ankle    Past Surgical History:  - Right ankle surgery 1999  - Right temporal lobectomy 2007  - Most recent colonoscopy 4/2018    Current Medications:  Vitamin D  Keppra 1000mg BID    Drug and lactation database from the [place] National [employer] of Medicine:  http://toxnet.nlm.nih.gov/cgi-bin/sis/htmlgen?LACT    Allergies:  Patient has no known allergies.    Social History:   - Denies tobacco or illicit drug use. Occasional alcohol use  - Works as  for [employer]. No work exposures identified  - Single    Family History:  Family History   Problem Relation Age of Onset     Hypertension Mother      Hyperlipidemia Mother      Cancer Father         prostate     Cancer Paternal Grandfather      Diabetes Maternal Grandmother      Colon Cancer Maternal Uncle      Ovarian Cancer Paternal Aunt      Melanoma No family hx of      Skin Cancer No family hx of    - Denies family history of birth defects, complications with deliveries, bleeding or clotting disorder. Reports mother and father both have HTN. Denies any issues with anesthesia     ROS:  10-point ROS negative except as in HPI     PHYSICAL EXAM:  Gen: NAD, well appearing  Chest: no respiratory distress     Labs:   Blood type A, Rh negative   AMH 0.023  TSH 0.91  Lutropin 9.3  Hep Bs Ab immune  HepCAb NR  RPR  NR  HIV NR  Gc/Ch NR  Pap NILM HPV neg (12/2016)    ASSESSMENT:  Mellisa Thompson is a 43 year old P0 presenting with concerns regarding preconception counseling. Past medical history notable for epilepsy s/p temporal lobectomy- no seizures since surgery, ruptured diverticulum 1/2018, anxiety, and depression.     RECOMMENDATIONS:    #Preconception counseling   - Overall patients medical conditions are optimized and patient okay from that stand point to proceed with pregnancy. Recommend waiting 6 months due to recent travel to [place] (potential Zika exposure)  - Avoidance of alcohol.  - Daily prenatal vitamin.   - Daily folic acid supplement 4 mg given that she is on keppra - one year prescription called in today - will take 4 -#1mg tablets daily.   - Patient met with genetic counselor today to discuss age related risks  - Patient concerned about bone health- discussed recommendation for weight-bearing exercises as agents to improve bone density are not recommended during conception and pregnancy.    #Epilepsy   - S/p right temporal lobectomy for intractable epilepsy   - No seizures since surgery   - Continue Keppra 1000 mg BID  - Due to increased risk of neural tube defect, recommend daily folic acid supplement 4 mg 3-6 months prior to pregnancy   - If patient becomes pregnant her dose for Keppra will likely need to be increased to remain in therapeutic range  - Continue following with her neurologist Dr. Mohan, last appointment 8/18/18    #Anxiety and depression  - Discussed risk of this worsening in pregnancy and in postpartum period  - Continue following with therapist - patient open to temporary use of medications if need arises.     #Diverticulum-   - History of ruptured diverticulum, recently hospitalized 01/2018 for perforated viscus, CT suggestive of perforated distal small bowel vs sigmoid diverticular perforation   - No current data for pregnant patients in regards to risk of recurrence in pregnancy. Discussed  this with patient. Do not see this as barrier for patient to proceed with pregnancy.   - Colonoscopy 4/2018- diverticulum, no concern for crohns disease   - Currently using 1% cortisone cream for rectal itching  - this can be safely continued in pregnancy  - Patient follows with Dr. Baker, last appointment 9/25/18    #Risk of zika exposure  - Recent travel to [place] 12/21- 1/6/19.   - Recommend waiting 6 months before getting pregnant     #Infertility   - Continue following with Dr. Stover for infertility  - Given low AMH of 0.023, patient will likely need IVF with donor egg or donor embryo.     I acted as a scribe for Dr. Martín Martin MD  Obstetrics and Gyncology, PGY-2  January 15, 2019    This note, as scribed, accurately reflects the examination, my impressions, and the plan as I discussed it with the patient.     Berry Resendiz MD  Maternal Fetal Medicine

## 2019-01-15 ENCOUNTER — OFFICE VISIT (OUTPATIENT)
Dept: MATERNAL FETAL MEDICINE | Facility: CLINIC | Age: 44
End: 2019-01-15
Attending: OBSTETRICS & GYNECOLOGY
Payer: COMMERCIAL

## 2019-01-15 DIAGNOSIS — O09.529 AMA (ADVANCED MATERNAL AGE) MULTIGRAVIDA 35+: ICD-10-CM

## 2019-01-15 DIAGNOSIS — Z31.69 ENCOUNTER FOR PRECONCEPTION CONSULTATION: ICD-10-CM

## 2019-01-15 DIAGNOSIS — Z31.69 ENCOUNTER FOR PRECONCEPTION CONSULTATION: Primary | ICD-10-CM

## 2019-01-15 PROCEDURE — G0463 HOSPITAL OUTPT CLINIC VISIT: HCPCS

## 2019-01-15 PROCEDURE — 96040 ZZH GENETIC COUNSELING, EACH 30 MINUTES: CPT | Mod: ZF | Performed by: GENETIC COUNSELOR, MS

## 2019-01-15 RX ORDER — LANOLIN ALCOHOL/MO/W.PET/CERES
400 CREAM (GRAM) TOPICAL DAILY
Qty: 30 TABLET | Refills: 11 | Status: SHIPPED | OUTPATIENT
Start: 2019-01-15 | End: 2020-01-15

## 2019-01-15 RX ORDER — FOLIC ACID 1 MG/1
4 TABLET ORAL DAILY
Qty: 30 TABLET | Refills: 11 | Status: SHIPPED | OUTPATIENT
Start: 2019-01-15 | End: 2020-01-15

## 2019-01-15 NOTE — NURSING NOTE
Patient seen by  and Dr. Resendiz today to discuss preconception issues due to seizure disorder and AMA.  Patient seen by neurology clinic at the [facility].  Patient desiring pregnancy through IVF.  Dr. Resendiz in to discuss health issues and current medications as they relate to pregnancy and fertility treatment.  Patient prescribed Folate 4mg (as Folivite 400mcg) and sent to [facility] on [address] per patient chart history of Keppra order.  Patient discharged stable and ambulatory after visit.

## 2019-01-15 NOTE — PROGRESS NOTES
[facility]  Genetic Counseling Consult    Patient: Mellisa Thompson YOB: 1975   Date of Service: 1/15/19      Mellisa Thompson was seen at [facility] for genetic and MFM consultation to discuss a possible future pregnancy. She has been referred by Dr. Delong with [place] for Reproductive [facility]. Mellisa was unaccompanied to today's visit.       Impression/Plan:   1.  Mellisa had an MFM consultation with Dr. Berry Resendiz today. See consult note for complete discussion.      2.  Mellisa reported that she hopes to attempt pregnancy with intrauterine insemination. She is also aware that in vitro fertilization is an option. We discussed the availability of preimplantation screening if she were to desire this.     Pregnancy History:   /Parity:    Current Age: 43 year old  Mellisa continues to have regular menstrual cycles. She reports that her last menstrual period was last week.    Medical History:   Mellisa has a diagnosis of epilepsy. She experienced her first seizure at age 2 and was eventually diagnosed with intractable epilepsy at age 27. She completed a right temporal lobectomy in 2007 and is currently being managed with Keppra (Levetiracetam).     Her use of Keppra was discussed by Dr. Berry Resendiz today. The FDA categorizes Keppra has a Category C medication, therefore, the potential benefit should justify potential risks to a developing fetus. Mellisa follows with a neurologist regularly.       Family History:   A three-generation pedigree was obtained, and is scanned under the  Media  tab. Mellisa does not currently have a partner and is planning on choosing a sperm donor to attempt pregnancy. She has not yet chosen a sperm donor, therefore, no family history information was available.    The following significant findings were reported by Mellisa:    Mellisa has a nephew with cerebral palsy. Per her report, he was born  "prematurely and \"did not get the appropriate medical care at birth.\"    Mellisa comes from a large extended family. She denies any family history of multiple miscarriages, stillbirths, birth defects, mental retardation, known genetic conditions, and consanguinity.       Carrier Screening:       Expanded carrier screening for mutations in a large panel of genes associated with autosomal recessive conditions including cystic fibrosis, spinal muscular atrophy, and others, is now available.      We discussed the availability of carrier screening today, especially preconception. She is in the beginning stages of considering insemination with a sperm donor versus IVF with a sperm donor versus IVF with a sperm and egg donor (or embryo adoption). If a sperm and/or egg donor is used, the donor may have already completed carrier screening. If this is the case, Mellisa may want to confirm that the two biological parents are not carriers for the same genetic condition. If her eggs are used, carrier screening is available for her to determine carrier status prior to pursing pregnancy.        Risk Assessment for Chromosome Conditions:   We explained that the risk for fetal chromosome abnormalities increases with maternal age. We discussed specific features of common chromosome abnormalities, including Down syndrome, trisomy 13, trisomy 18, and sex chromosome trisomies. Risks are based on the age of the egg. We discussed the below risks if her own eggs were used in the near future to achieve pregnancy. If an egg donor or adopted embryo are used, the risks would be dependent on the age of the egg used.      - At age 43 at midtrimester, the risk to have a baby with Down syndrome is 1 in 19.     - At age 43 at midtrimester, the risk to have a baby with any chromosome abnormality is 1 in 12.     Testing Options:   We discussed the following options prior to achieving pregnancy:   Carrier Screening    See above. Any carrier screening " results for donors should be reviewed to determine if there is an increased risk for a recessive genetic condition.     Preimplantation Genetic Screening (PGS)    PGS allows for the genetic screening of embryos prior to IVF transfer. These results can be used to determine which embryos should be transferred.    We discussed the following options once a pregnancy is achieved:   Non-invasive Prenatal Testing (NIPT)    Maternal plasma cell-free DNA testing; first trimester ultrasound with nuchal translucency and nasal bone assessment is recommended, when appropriate    Screens for fetal trisomy 21, trisomy 13, trisomy 18, and sex chromosome aneuploidy    Cannot screen for open neural tube defects; maternal serum AFP after 15 weeks is recommended     Chorionic villus sampling (CVS)    Invasive procedure typically performed in the first trimester by which placental villi are obtained for the purpose of chromosome analysis and/or other prenatal genetic analysis    Diagnostic results; >99% sensitivity for fetal chromosome abnormalities    Cannot test for open neural tube defects; maternal serum AFP after 15 weeks is recommended     Genetic Amniocentesis    Invasive procedure typically performed in the second trimester by which amniotic fluid is obtained for the purpose of chromosome analysis and/or other prenatal genetic analysis    Diagnostic results; >99% sensitivity for fetal chromosome abnormalities    AFAFP measurement tests for open neural tube defects     Comprehensive (Level II) ultrasound: Detailed ultrasound performed between 18-22 weeks gestation to screen for major birth defects and markers for aneuploidy.      We reviewed the benefits and limitations of this testing.  Screening tests provide a risk assessment specific to the pregnancy for certain fetal chromosome abnormalities, but cannot definitively diagnose or exclude a fetal chromosome abnormality.  Follow-up genetic counseling and consideration of  diagnostic testing is recommended with any abnormal screening result. Diagnostic tests carry inherent risks- including risk of miscarriage- that require careful consideration.  These tests can detect fetal chromosome abnormalities with greater than 99% certainty.  Results can be compromised by maternal cell contamination or mosaicism, and are limited by the resolution of cytogenetic G-banding technology.  There is no screening nor diagnostic test that can detect all forms of birth defects or mental disability.     It was a pleasure to be involved with Mellisa s care. Face-to-face time of the meeting was 30 minutes.    Taylor Jama MS, [facility]  Maternal Fetal Medicine  [facility]  Ph: [phone]  [email or web address]

## 2019-01-16 NOTE — PROGRESS NOTES
Patient had no complaints of pain and up independently. AVSS. Patient ready for discharge.    Discharge instructions reviewed with patient, questions answered. PIV removed. Patient ambulated off unit.    LLE/weight-bearing as tolerated

## 2019-01-21 ENCOUNTER — ANCILLARY PROCEDURE (OUTPATIENT)
Dept: CARDIOLOGY | Facility: CLINIC | Age: 44
End: 2019-01-21
Payer: COMMERCIAL

## 2019-01-21 DIAGNOSIS — Z31.41 FERTILITY TESTING: ICD-10-CM

## 2019-05-02 ENCOUNTER — OFFICE VISIT (OUTPATIENT)
Dept: PSYCHOLOGY | Facility: CLINIC | Age: 44
End: 2019-05-02
Payer: COMMERCIAL

## 2019-05-02 DIAGNOSIS — F33.40 RECURRENT MAJOR DEPRESSIVE DISORDER IN REMISSION (H): Primary | ICD-10-CM

## 2019-05-16 ENCOUNTER — TELEPHONE (OUTPATIENT)
Dept: NEUROLOGY | Facility: CLINIC | Age: 44
End: 2019-05-16

## 2019-05-16 DIAGNOSIS — R56.9 CONVULSIONS, UNSPECIFIED CONVULSION TYPE (H): ICD-10-CM

## 2019-05-16 RX ORDER — LEVETIRACETAM 1000 MG/1
1000 TABLET ORAL 2 TIMES DAILY
Qty: 180 TABLET | Refills: 3 | Status: SHIPPED | OUTPATIENT
Start: 2019-05-16

## 2019-05-30 NOTE — PROGRESS NOTES
Health Psychology                                      Department of Medicine                                           [facility] Mail Code 744    Edwina Lara, Ph.D., L.P. [phone]  [address] [place] [address], [facility] Luzma Oleary, Ph.D.,  L.P. [phone]  [place] [address]  Med Hansen, Ph.D., A.B.P.CARLI., L.P. [phone]       Joyce uHerta, PhD, LP [phone]  ________________________________________________________________________________________________  Health Psychology - Follow up Visit  Confidential Summary*    REFERRAL SOURCE  Self    CHIEF COMPLAINT/REASON FOR VISIT  Patient seen for session.  Patient has recurrent history of mood disturbance that accompanies seasonal changes.  In addition, chronic job stress.    Patient has not been seen in over 6 months.      Subjective:  Patient began with report that she has contacted an  from the Department of Human Rights and she is considering opening up a complaint against providers at the [facility] who she perceive did not effectively warn her that having her private medical information described in an article in a sponsored magazine would have an adverse impact on her life.  She described the perception that a violation to her health privacy occurred.  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  She is attempting to work with the department of diversity and equity for support.      The patient reported that otherwise, she is doing well and is enjoying her job and has formed some close friendships at work.  She continues to deny any dating but is open to the possibility of meeting someone.      She continues to be unsure of how she wants to pursue career development options.      Objective:  Patient was on time for today s session, appropriately groomed and dressed, and  demonstrated good eye contact.  She appeared alert and oriented.   Patient adamantly denied suicidal or assaultive ideation, plan, or intent.       Assessment:  The patients is experiencing improvements overall but has returned to her concerns about the impact of the article on her overall well being.     Plan: See in two or three weeks, patient to call.      Time In: 2:00  Time Out::  3:00    Diagnosis:  Axis I Mdd, recurrent with seasonal component, in remission   Axis II Deferred   Axis III Epilepsy, see medical record   Axis IV Psychosocial and Environmental Stressors: fertility process     Joyce Huerta, Ph.D., L.P.      *In accordance with the Rules of the [place] [employer], it is noted that psychological descriptions and scientific procedures underlying psychological evaluations have limitations.  Absolute predictions cannot be made based on information in this report.

## 2019-06-13 ENCOUNTER — OFFICE VISIT (OUTPATIENT)
Dept: PSYCHOLOGY | Facility: CLINIC | Age: 44
End: 2019-06-13
Payer: COMMERCIAL

## 2019-06-13 DIAGNOSIS — F33.1 MDD (MAJOR DEPRESSIVE DISORDER), RECURRENT EPISODE, MODERATE (H): ICD-10-CM

## 2019-06-13 DIAGNOSIS — F43.23 ADJUSTMENT DISORDER WITH MIXED ANXIETY AND DEPRESSED MOOD: Primary | ICD-10-CM

## 2019-06-20 ENCOUNTER — OFFICE VISIT (OUTPATIENT)
Dept: PSYCHOLOGY | Facility: CLINIC | Age: 44
End: 2019-06-20
Payer: COMMERCIAL

## 2019-06-20 DIAGNOSIS — F33.1 MDD (MAJOR DEPRESSIVE DISORDER), RECURRENT EPISODE, MODERATE (H): ICD-10-CM

## 2019-06-20 DIAGNOSIS — F43.23 ADJUSTMENT DISORDER WITH MIXED ANXIETY AND DEPRESSED MOOD: Primary | ICD-10-CM

## 2019-06-24 NOTE — PROGRESS NOTES
"Health Psychology                                      Department of Medicine                                           [facility] Mail Code 742    Edwina Lara, Ph.D., L.P. [phone]  [address] [place] [address], [facility] Luzma Oleary, Ph.D.,  L.P. [phone]  [place] [address]  Med Hansen, Ph.D., A.B.P.P., L.P. [phone]       Joyce Huerta, PhD, LP [phone]  ________________________________________________________________________________________________  Health Psychology - Follow up Visit  Confidential Summary*    REFERRAL SOURCE  Self    CHIEF COMPLAINT/REASON FOR VISIT  Patient seen for session.  Patient has recurrent history of mood disturbance that accompanies seasonal changes.  In addition, chronic job stress.    Patient has not been seen in over 6 months.      Subjective:  Patient seen for ongoing treatment.  She began with report that she quit her job and will not be returning in the fall.  She was tearful about this decision but described the environment with her principal as stressful and she believed continuing to work under the chaotic was damaging to her mental and physical health. She repotred that she continued to feel that she was unable to trust her and believed that she continued to allow herself to be \"retraumatized\" each time she engaged in another perceived impulsive, demanding attempt to change a procedure.  She continued to also believe that she was not respected and that her bilingual skills with parents were being used excessively with little perceived compensation.  She reported that she is feeling relieved to know that she will not have to return but at the same time, she enjoyed several colleagues and \"here we go again\" regarding her unstable financial situation.  She reported that she has money in savings to support herself for a few months but is now unable to travel this summer and she will need to secure " another position in the fall.  She is now stalled in her desire to pursue fertility.    She continues to describe frustration that she believes her relationship and career challenges are related to the MM[employer] article.  She is now perceiving a human rights violation that she was not aware of the possibility of a profound negative impact of the article on both her personal and professional life.  It has been 12 years since her surgery and she reported that she had hoped that in exchange for the article she signed a consent to allow to be published about herself, she would be able to rely on the support of the [facility] to launch her advocacy project to increase awareness of epilepsy in the  community.  Unfortunately, she has struggled to identify revenue to support this passion.      She described living in survival mode since her divorce and returning to [place].  She continues to consider relocating but is reluctant because of awareness of the challenges of starting over.      Will reach out to neurology for consultation on this case.  Patient continues to express a desire for acknowledgement of the negative perceived impact of the article on her life, the reported refusal of the [facility] to remove the article from the internet and her perception that her status as a female  immigrant may have allowed her to be taken advantage of as she is not convinced that she understood the consent she signed for permission to publish the article.      Objective:  Patient was on time for today s session, appropriately groomed and dressed, and demonstrated good eye contact.  She appeared alert and oriented.   She was tearful throughout the session.  Patient adamantly denied suicidal or assaultive ideation, plan, or intent.       Assessment:  The patients is experiencing worsened depression and increased voiced concerns about the impact of the article on her overall well being.     Plan: See in two or three weeks, patient to  call.  She is concerned about insurance and her ability to pay for ongoing treatment.  Will be applying for [employer].    Time In: 3:00  Time Out::  4:00    Diagnosis:  Axis I Mdd, recurrent with seasonal component, moderate; adjustment disorder with depressed mood (job loss)   Axis II Deferred   Axis III Epilepsy, see medical record   Axis IV Psychosocial and Environmental Stressors: fertility process, job loss     Joyce Huerta, Ph.D., L.P.      *In accordance with the Rules of the [place] [employer], it is noted that psychological descriptions and scientific procedures underlying psychological evaluations have limitations.  Absolute predictions cannot be made based on information in this report.

## 2019-06-25 NOTE — PROGRESS NOTES
Health Psychology                                      Department of Medicine                                           [facility] Mail Code 741    Edwina Lara, Ph.D., L.P. [phone]  [address] [place] [address], [facility] Luzma Oleary, Ph.D.,  L.P. [phone]  [place] [address]  Med Hansen, Ph.D., A.B.P.P., L.P. [phone]       Joyce Huerta, PhD, LP [phone]  ________________________________________________________________________________________________  Health Psychology - Follow up Visit  Confidential Summary*    REFERRAL SOURCE  Self    CHIEF COMPLAINT/REASON FOR VISIT  Patient seen for session.  Patient has recurrent history of mood disturbance that accompanies seasonal changes.  In addition, chronic job stress.    Patient has not been seen in over 6 months.      Subjective:  Patient seen for ongoing treatment.  Began with report that she continues to feel sad and defeated after quitting job.  She feels that she is unable to progress in her life goals because of the MMF article and that each time she believes she is making progress, she is confronted with another incidence of being taken advantage of which triggers her recall of the article and her perception that had she not agreed to allow it to occur, her current life state would be different.      Patient reported that she will take time this summer to pursue legal recourse to see if she may have a case.  Encouraged her to take these steps and cautioned that these cases can take time to resolve and that she may benefit from increased distress tolerance skills during the process.  She was encouraged to discuss how she is taking care of herself during this transition.  She continues to volunteer for the [facility] committee, is involved in a social justice program at her [employer] and continues to see friends in her neighborhood.  She is engaged in regular yoga practice and enjoys  "walking.  She is setting aside time this summer to pursue her desire to pursue her case.      This past week, she invited Hiren (friend from [facility] and former boss) to join her for a meeting with the [place] Department of [employer] Rights.  She was disappointed at the outcome of this meeting as she was informed that the first step is to write a letter to the commissioner so that she may follow the steps in the legislature process and policy.  She tearfully described the perception that each step she takes in the process is experienced as a retraumatization and that she continues to give the situation \"too much time of her life\".    As the patient has asked me to keep track of her goals, I invited a discussion of her desire to pursue fertility to have a baby.  She reported that she is unable to consider at this time because she is now unemployed.  Discussed her age and the limit set at fertility clinics.  Encouraged her to discuss but patient reticent to approach at this time.      Objective:  Patient was on time for today s session, appropriately groomed and dressed, and demonstrated good eye contact.  She appeared alert and oriented.   She was tearful throughout the session.  Patient adamantly denied suicidal or assaultive ideation, plan, or intent.       Assessment:  The patients is experiencing worsened depression and anxiety and has quit her job.  She expressed increased concerns about the impact of the article on her overall well being.     Plan: See in two or three weeks, patient to call.  She is concerned about insurance and her ability to pay for ongoing treatment.  Will be applying for [employer].    Time In: 10:00  Time Out::  11:00    Diagnosis:  Axis I Mdd, recurrent with seasonal component, moderate; adjustment disorder with depressed mood (job loss)   Axis II Deferred   Axis III Epilepsy, see medical record   Axis IV Psychosocial and Environmental Stressors: fertility process, job loss     Joyce Huerta, Ph.D., " [place]      *In accordance with the Rules of the [place] [employer], it is noted that psychological descriptions and scientific procedures underlying psychological evaluations have limitations.  Absolute predictions cannot be made based on information in this report.

## 2019-09-09 ENCOUNTER — ANCILLARY PROCEDURE (OUTPATIENT)
Dept: MAMMOGRAPHY | Facility: CLINIC | Age: 44
End: 2019-09-09
Attending: FAMILY MEDICINE
Payer: COMMERCIAL

## 2019-09-09 DIAGNOSIS — Z12.31 VISIT FOR SCREENING MAMMOGRAM: ICD-10-CM

## 2019-11-07 ENCOUNTER — HEALTH MAINTENANCE LETTER (OUTPATIENT)
Age: 44
End: 2019-11-07

## 2020-11-29 ENCOUNTER — HEALTH MAINTENANCE LETTER (OUTPATIENT)
Age: 45
End: 2020-11-29

## 2021-02-14 ENCOUNTER — HEALTH MAINTENANCE LETTER (OUTPATIENT)
Age: 46
End: 2021-02-14

## 2021-09-25 ENCOUNTER — HEALTH MAINTENANCE LETTER (OUTPATIENT)
Age: 46
End: 2021-09-25

## 2022-01-15 ENCOUNTER — HEALTH MAINTENANCE LETTER (OUTPATIENT)
Age: 47
End: 2022-01-15

## 2022-03-12 ENCOUNTER — HEALTH MAINTENANCE LETTER (OUTPATIENT)
Age: 47
End: 2022-03-12

## 2022-12-26 ENCOUNTER — HEALTH MAINTENANCE LETTER (OUTPATIENT)
Age: 47
End: 2022-12-26

## 2023-04-16 ENCOUNTER — HEALTH MAINTENANCE LETTER (OUTPATIENT)
Age: 48
End: 2023-04-16

## (undated) DEVICE — SPECIMEN CONTAINER 3OZ W/FORMALIN 59901

## (undated) DEVICE — ENDO FORCEP ENDOJAW BIOPSY 2.8MMX230CM FB-220U

## (undated) DEVICE — SOL WATER IRRIG 1000ML BOTTLE 2F7114

## (undated) DEVICE — ENDO CAP AND TUBING STERILE FOR ENDOGATOR  100130

## (undated) DEVICE — SUCTION MANIFOLD NEPTUNE 2 SYS 4 PORT 0702-020-000

## (undated) RX ORDER — FENTANYL CITRATE 50 UG/ML
INJECTION, SOLUTION INTRAMUSCULAR; INTRAVENOUS
Status: DISPENSED
Start: 2018-04-02

## (undated) RX ORDER — SIMETHICONE 40MG/0.6ML
SUSPENSION, DROPS(FINAL DOSAGE FORM)(ML) ORAL
Status: DISPENSED
Start: 2018-04-02